# Patient Record
Sex: FEMALE | Race: BLACK OR AFRICAN AMERICAN | NOT HISPANIC OR LATINO | Employment: FULL TIME | URBAN - METROPOLITAN AREA
[De-identification: names, ages, dates, MRNs, and addresses within clinical notes are randomized per-mention and may not be internally consistent; named-entity substitution may affect disease eponyms.]

---

## 2019-03-04 ENCOUNTER — TRANSFERRED RECORDS (OUTPATIENT)
Dept: MULTI SPECIALTY CLINIC | Facility: CLINIC | Age: 34
End: 2019-03-04

## 2019-03-04 LAB
HPV ABSTRACT: NORMAL
PAP-ABSTRACT: NORMAL
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
## 2021-05-26 ENCOUNTER — OFFICE VISIT (OUTPATIENT)
Dept: FAMILY MEDICINE | Facility: CLINIC | Age: 36
End: 2021-05-26
Payer: COMMERCIAL

## 2021-05-26 VITALS
SYSTOLIC BLOOD PRESSURE: 112 MMHG | OXYGEN SATURATION: 99 % | WEIGHT: 171.8 LBS | TEMPERATURE: 98.7 F | HEART RATE: 107 BPM | RESPIRATION RATE: 16 BRPM | DIASTOLIC BLOOD PRESSURE: 60 MMHG

## 2021-05-26 DIAGNOSIS — Z91.013 FISH ALLERGY: ICD-10-CM

## 2021-05-26 DIAGNOSIS — J45.30 MILD PERSISTENT ASTHMA WITHOUT COMPLICATION: Primary | ICD-10-CM

## 2021-05-26 DIAGNOSIS — F43.10 PTSD (POST-TRAUMATIC STRESS DISORDER): ICD-10-CM

## 2021-05-26 DIAGNOSIS — L30.9 ECZEMA, UNSPECIFIED TYPE: ICD-10-CM

## 2021-05-26 DIAGNOSIS — F41.9 ANXIETY: ICD-10-CM

## 2021-05-26 DIAGNOSIS — F32.1 MODERATE MAJOR DEPRESSION (H): ICD-10-CM

## 2021-05-26 PROCEDURE — 99204 OFFICE O/P NEW MOD 45 MIN: CPT | Performed by: PHYSICIAN ASSISTANT

## 2021-05-26 RX ORDER — EPINEPHRINE 0.3 MG/.3ML
0.3 INJECTION SUBCUTANEOUS PRN
Qty: 1 EACH | Refills: 0 | Status: SHIPPED | OUTPATIENT
Start: 2021-05-26 | End: 2021-12-06

## 2021-05-26 RX ORDER — BECLOMETHASONE DIPROPIONATE HFA 80 UG/1
AEROSOL, METERED RESPIRATORY (INHALATION)
COMMUNITY
Start: 2020-07-24 | End: 2021-05-26

## 2021-05-26 RX ORDER — BECLOMETHASONE DIPROPIONATE HFA 80 UG/1
1 AEROSOL, METERED RESPIRATORY (INHALATION) 2 TIMES DAILY
Qty: 10.6 G | Refills: 5 | Status: SHIPPED | OUTPATIENT
Start: 2021-05-26 | End: 2022-06-24

## 2021-05-26 RX ORDER — FLUTICASONE PROPIONATE 50 MCG
SPRAY, SUSPENSION (ML) NASAL
COMMUNITY
Start: 2020-10-17 | End: 2022-06-24

## 2021-05-26 RX ORDER — TRIAMCINOLONE ACETONIDE 1 MG/G
OINTMENT TOPICAL 2 TIMES DAILY
Qty: 80 G | Refills: 1 | Status: SHIPPED | OUTPATIENT
Start: 2021-05-26 | End: 2023-06-29

## 2021-05-26 RX ORDER — ESCITALOPRAM OXALATE 10 MG/1
10 TABLET ORAL DAILY
Qty: 30 TABLET | Refills: 1 | Status: SHIPPED | OUTPATIENT
Start: 2021-05-26 | End: 2021-08-17

## 2021-05-26 RX ORDER — ALBUTEROL SULFATE 90 UG/1
AEROSOL, METERED RESPIRATORY (INHALATION)
COMMUNITY
Start: 2020-01-31 | End: 2021-05-26

## 2021-05-26 RX ORDER — MONTELUKAST SODIUM 10 MG/1
TABLET ORAL
COMMUNITY
Start: 2020-10-17 | End: 2021-05-26

## 2021-05-26 RX ORDER — MONTELUKAST SODIUM 10 MG/1
TABLET ORAL
Qty: 90 TABLET | Refills: 1 | Status: SHIPPED | OUTPATIENT
Start: 2021-05-26 | End: 2021-05-27

## 2021-05-26 RX ORDER — ALBUTEROL SULFATE 90 UG/1
AEROSOL, METERED RESPIRATORY (INHALATION)
Qty: 18 G | Refills: 3 | Status: SHIPPED | OUTPATIENT
Start: 2021-05-26 | End: 2021-05-27

## 2021-05-26 SDOH — HEALTH STABILITY: MENTAL HEALTH: HOW MANY STANDARD DRINKS CONTAINING ALCOHOL DO YOU HAVE ON A TYPICAL DAY?: NOT ASKED

## 2021-05-26 SDOH — HEALTH STABILITY: MENTAL HEALTH: HOW OFTEN DO YOU HAVE A DRINK CONTAINING ALCOHOL?: NOT ASKED

## 2021-05-26 SDOH — HEALTH STABILITY: MENTAL HEALTH: HOW OFTEN DO YOU HAVE 6 OR MORE DRINKS ON ONE OCCASION?: NOT ASKED

## 2021-05-26 ASSESSMENT — ANXIETY QUESTIONNAIRES
6. BECOMING EASILY ANNOYED OR IRRITABLE: MORE THAN HALF THE DAYS
2. NOT BEING ABLE TO STOP OR CONTROL WORRYING: NEARLY EVERY DAY
7. FEELING AFRAID AS IF SOMETHING AWFUL MIGHT HAPPEN: MORE THAN HALF THE DAYS
GAD7 TOTAL SCORE: 16
1. FEELING NERVOUS, ANXIOUS, OR ON EDGE: MORE THAN HALF THE DAYS
3. WORRYING TOO MUCH ABOUT DIFFERENT THINGS: NEARLY EVERY DAY
IF YOU CHECKED OFF ANY PROBLEMS ON THIS QUESTIONNAIRE, HOW DIFFICULT HAVE THESE PROBLEMS MADE IT FOR YOU TO DO YOUR WORK, TAKE CARE OF THINGS AT HOME, OR GET ALONG WITH OTHER PEOPLE: EXTREMELY DIFFICULT
5. BEING SO RESTLESS THAT IT IS HARD TO SIT STILL: MORE THAN HALF THE DAYS

## 2021-05-26 ASSESSMENT — PATIENT HEALTH QUESTIONNAIRE - PHQ9
SUM OF ALL RESPONSES TO PHQ QUESTIONS 1-9: 16
5. POOR APPETITE OR OVEREATING: MORE THAN HALF THE DAYS

## 2021-05-26 NOTE — Clinical Note
PAP and HPV available in Care Everywhere from [facility] on 3/4/19 and pap NIL with HPV negative

## 2021-05-26 NOTE — PATIENT INSTRUCTIONS
Follow up with gynecology at [facility] ([phone]) formerly called [facility].   Continue medications as you have been taking   Follow up with us in 6 months.   Call with side effects or concerns.   If you have difficulties with MyChart call [phone].  Start lexapro 10 mg daily and follow up with us in one month.

## 2021-05-27 RX ORDER — MONTELUKAST SODIUM 10 MG/1
10 TABLET ORAL AT BEDTIME
Qty: 90 TABLET | Refills: 1 | Status: SHIPPED | OUTPATIENT
Start: 2021-05-27 | End: 2022-06-03

## 2021-05-27 RX ORDER — ALBUTEROL SULFATE 90 UG/1
AEROSOL, METERED RESPIRATORY (INHALATION)
Qty: 18 G | Refills: 3 | Status: SHIPPED | OUTPATIENT
Start: 2021-05-27 | End: 2021-10-28

## 2021-05-27 ASSESSMENT — ANXIETY QUESTIONNAIRES: GAD7 TOTAL SCORE: 16

## 2021-05-27 ASSESSMENT — ASTHMA QUESTIONNAIRES: ACT_TOTALSCORE: 20

## 2021-07-13 ENCOUNTER — TELEPHONE (OUTPATIENT)
Dept: FAMILY MEDICINE | Facility: CLINIC | Age: 36
End: 2021-07-13

## 2021-07-13 NOTE — TELEPHONE ENCOUNTER
Disability forms for which phone visit was not started  Needs all records from June 25 forward faxed - release of information included in request - see forms that were faxed   Needs by July 16-

## 2021-07-13 NOTE — TELEPHONE ENCOUNTER
[place] Form faxed back to [employer] at fax [phone].  Form asking for OV notes from 6/25/21 to present, but none available in that time frame.  Last Visit 5/26/21.   Form sent for abstraction      ROCIO Larry.

## 2021-08-17 ENCOUNTER — TELEPHONE (OUTPATIENT)
Dept: FAMILY MEDICINE | Facility: CLINIC | Age: 36
End: 2021-08-17

## 2021-08-17 DIAGNOSIS — F41.9 ANXIETY: ICD-10-CM

## 2021-08-17 DIAGNOSIS — F43.10 PTSD (POST-TRAUMATIC STRESS DISORDER): ICD-10-CM

## 2021-08-17 DIAGNOSIS — F32.1 MODERATE MAJOR DEPRESSION (H): ICD-10-CM

## 2021-08-17 RX ORDER — ESCITALOPRAM OXALATE 10 MG/1
10 TABLET ORAL DAILY
Qty: 30 TABLET | Refills: 0 | Status: SHIPPED | OUTPATIENT
Start: 2021-08-17 | End: 2021-10-14

## 2021-08-17 NOTE — TELEPHONE ENCOUNTER
Routing refill request to provider for review/approval because:    Failed PHQ-9 5/26/2021 (16), please advise.     Diamond Wesley RN, BSN   [facility] [place]

## 2021-10-12 ENCOUNTER — TELEPHONE (OUTPATIENT)
Dept: FAMILY MEDICINE | Facility: CLINIC | Age: 36
End: 2021-10-12

## 2021-10-12 DIAGNOSIS — F32.1 MODERATE MAJOR DEPRESSION (H): ICD-10-CM

## 2021-10-12 DIAGNOSIS — F41.9 ANXIETY: ICD-10-CM

## 2021-10-12 DIAGNOSIS — F43.10 PTSD (POST-TRAUMATIC STRESS DISORDER): ICD-10-CM

## 2021-10-14 RX ORDER — ESCITALOPRAM OXALATE 10 MG/1
10 TABLET ORAL DAILY
Qty: 15 TABLET | Refills: 0 | Status: SHIPPED | OUTPATIENT
Start: 2021-10-14 | End: 2021-11-03 | Stop reason: DRUGHIGH

## 2021-10-14 NOTE — TELEPHONE ENCOUNTER
Given 15 days of medication- no further refills without follow up -if schedules appointment can give 30 days to make it to appointment

## 2021-10-14 NOTE — TELEPHONE ENCOUNTER
"Routing refill request to provider for review/approval because:  Requested Prescriptions   Pending Prescriptions Disp Refills    escitalopram (LEXAPRO) 10 MG tablet 30 tablet 0     Sig: Take 1 tablet (10 mg) by mouth daily ++NO FURTHER REFILLS WITHOUT FOLLOW UP+++        SSRIs Protocol Failed - 10/12/2021  9:26 AM        Failed - PHQ-9 score less than 5 in past 6 months     Please review last PHQ-9 score.           Passed - Medication is active on med list        Passed - Patient is age 18 or older        Passed - No active pregnancy on record        Passed - No positive pregnancy test in last 12 months        Passed - Recent (6 mo) or future (30 days) visit within the authorizing provider's specialty     Patient had office visit in the last 6 months or has a visit in the next 30 days with authorizing provider or within the authorizing provider's specialty.  See \"Patient Info\" tab in inbasket, or \"Choose Columns\" in Meds & Orders section of the refill encounter.                PHQ 5/26/2021   PHQ-9 Total Score 16   Q9: Thoughts of better off dead/self-harm past 2 weeks Not at all     Belinda ROSENN, RN        "

## 2021-10-14 NOTE — TELEPHONE ENCOUNTER
LVM notified of partial rx refill, please call to schedule appt with provider for more refills per provider notes below. Given clinic scheduling [phone]

## 2021-11-02 ENCOUNTER — VIRTUAL VISIT (OUTPATIENT)
Dept: FAMILY MEDICINE | Facility: CLINIC | Age: 36
End: 2021-11-02
Payer: COMMERCIAL

## 2021-11-02 DIAGNOSIS — J45.30 MILD PERSISTENT ASTHMA WITHOUT COMPLICATION: ICD-10-CM

## 2021-11-02 DIAGNOSIS — F41.9 ANXIETY: ICD-10-CM

## 2021-11-02 DIAGNOSIS — G47.00 INSOMNIA, UNSPECIFIED TYPE: ICD-10-CM

## 2021-11-02 DIAGNOSIS — F32.1 MODERATE MAJOR DEPRESSION (H): Primary | ICD-10-CM

## 2021-11-02 DIAGNOSIS — F43.10 PTSD (POST-TRAUMATIC STRESS DISORDER): ICD-10-CM

## 2021-11-02 DIAGNOSIS — L50.9 URTICARIA: ICD-10-CM

## 2021-11-02 PROCEDURE — 99214 OFFICE O/P EST MOD 30 MIN: CPT | Mod: GT | Performed by: PHYSICIAN ASSISTANT

## 2021-11-02 RX ORDER — ESCITALOPRAM OXALATE 20 MG/1
20 TABLET ORAL DAILY
Qty: 30 TABLET | Refills: 1 | Status: SHIPPED | OUTPATIENT
Start: 2021-11-02 | End: 2022-01-10

## 2021-11-02 RX ORDER — ALBUTEROL SULFATE 90 UG/1
AEROSOL, METERED RESPIRATORY (INHALATION)
Qty: 18 G | Refills: 0 | Status: SHIPPED | OUTPATIENT
Start: 2021-11-02 | End: 2021-11-02

## 2021-11-02 RX ORDER — ALBUTEROL SULFATE 90 UG/1
AEROSOL, METERED RESPIRATORY (INHALATION)
Qty: 18 G | Refills: 4 | Status: SHIPPED | OUTPATIENT
Start: 2021-11-02 | End: 2022-06-24

## 2021-11-02 ASSESSMENT — ANXIETY QUESTIONNAIRES
GAD7 TOTAL SCORE: 6
2. NOT BEING ABLE TO STOP OR CONTROL WORRYING: SEVERAL DAYS
7. FEELING AFRAID AS IF SOMETHING AWFUL MIGHT HAPPEN: SEVERAL DAYS
GAD7 TOTAL SCORE: 6
6. BECOMING EASILY ANNOYED OR IRRITABLE: SEVERAL DAYS
1. FEELING NERVOUS, ANXIOUS, OR ON EDGE: SEVERAL DAYS
4. TROUBLE RELAXING: SEVERAL DAYS
GAD7 TOTAL SCORE: 6
5. BEING SO RESTLESS THAT IT IS HARD TO SIT STILL: NOT AT ALL
7. FEELING AFRAID AS IF SOMETHING AWFUL MIGHT HAPPEN: SEVERAL DAYS
8. IF YOU CHECKED OFF ANY PROBLEMS, HOW DIFFICULT HAVE THESE MADE IT FOR YOU TO DO YOUR WORK, TAKE CARE OF THINGS AT HOME, OR GET ALONG WITH OTHER PEOPLE?: SOMEWHAT DIFFICULT
3. WORRYING TOO MUCH ABOUT DIFFERENT THINGS: SEVERAL DAYS

## 2021-11-02 ASSESSMENT — PATIENT HEALTH QUESTIONNAIRE - PHQ9
10. IF YOU CHECKED OFF ANY PROBLEMS, HOW DIFFICULT HAVE THESE PROBLEMS MADE IT FOR YOU TO DO YOUR WORK, TAKE CARE OF THINGS AT HOME, OR GET ALONG WITH OTHER PEOPLE: SOMEWHAT DIFFICULT
SUM OF ALL RESPONSES TO PHQ QUESTIONS 1-9: 5
SUM OF ALL RESPONSES TO PHQ QUESTIONS 1-9: 5

## 2021-11-02 NOTE — PROGRESS NOTES
Ottoniel is a 36 year old who is being evaluated via a billable video visit.      How would you like to obtain your AVS? MyChart  If the video visit is dropped, the invitation should be resent by:   Will anyone else be joining your video visit? No    Video Start Time: 1:41 PM    Assessment & Plan     Moderate major depression (H)  Symptoms improved with lexapro but not completely controlled.  Increase lexapro from 10 mg to 20 mg and follow up with us in one month with virtual visit   - escitalopram (LEXAPRO) 20 MG tablet  Dispense: 30 tablet; Refill: 1    Anxiety  Symptoms improved with lexapro but not completely controlled.  Increase lexapro from 10 mg to 20 mg and follow up with us in one month  - escitalopram (LEXAPRO) 20 MG tablet  Dispense: 30 tablet; Refill: 1        Insomnia, unspecified type  Benadryl and melatonin no longer helpful.  Trial of trazodone 50-100mg at bedtime and follow up with us in one month  - traZODone (DESYREL) 50 MG tablet  Dispense: 60 tablet; Refill: 0    PTSD (post-traumatic stress disorder)  As above   - escitalopram (LEXAPRO) 20 MG tablet  Dispense: 30 tablet; Refill: 1    Mild persistent asthma without complication  Refilled albuterol- is on qvar controller med - using albuterol now that colder weather- med adjustment per allergy/asthma specialist given urticaria   - Adult Allergy/Asthma Referral  - albuterol (PROAIR HFA) 108 (90 Base) MCG/ACT inhaler  Dispense: 18 g; Refill: 4    Urticaria  Antihistamines and prednisone not helpful- follow up with allergy/asthma specialist- advised no antihistamines at least 5 days prior to appointment   - Adult Allergy/Asthma Referral      Prescription drug management       Patient Instructions   Increase lexapro from 10 mg to 20 mg daily for anxiety and depression.  Take trazodone 50 mg 1-2 tablets at bedtime as needed for insomnia.   Follow up with allergy/asthma specialist for asthma and urticaria.  Do not take antihistamines at least 5 days  prior to appointment or as directed when you schedule your appointment.   Follow up with us with a video visit in approximately one month- sooner if side effects or other concerns.       No follow-ups on file.    Perla Nieto PA-C  [facility] DM Alcazar is a 36 year old who presents for the following health issues     History of Present Illness     Asthma:  She presents for follow up of asthma.  She has no cough, no wheezing, and no shortness of breath. She is using a relief medication a few times a week. She does not miss any doses of her controller medication throughout the week.Patient is aware of the following triggers: dust mites and smoke. The patient has not had a visit to the Emergency Room, Urgent Care or Hospital due to asthma since the last clinic visit.     Mental Health Follow-up:  Patient presents to follow-up on Depression & Anxiety.Patient's depression since last visit has been:  Better  The patient is not having other symptoms associated with depression.  Patient's anxiety since last visit has been:  Better  The patient is having other symptoms associated with anxiety.  Any significant life events: relationship concerns  Patient is not feeling anxious or having panic attacks.  Patient has no concerns about alcohol or drug use.     Social History  Tobacco Use    Smoking status: Never Smoker    Smokeless tobacco: Never Used  Alcohol use: Yes    Comment: once a week  Drug use: Never      Today's PHQ-9         PHQ-9 Total Score:     (P) 5   PHQ-9 Q9 Thoughts of better off dead/self-harm past 2 weeks :   (P) Not at all   Thoughts of suicide or self harm:      Self-harm Plan:        Self-harm Action:          Safety concerns for self or others:           She eats 2-3 servings of fruits and vegetables daily.She consumes 1 sweetened beverage(s) daily.She exercises with enough effort to increase her heart rate 20 to 29 minutes per day.  She exercises with enough  effort to increase her heart rate 3 or less days per week. She is missing 2 dose(s) of medications per week.  She is not taking prescribed medications regularly due to remembering to take.       Reports that she has Hives that won't go away  Went to urgent care one month ago and given course of prednisone without improvement   All over arms and chest - benadryl doesn't help  Comes and goes- migratory rash    Works for [employer] - as an      Waking up every couple hours.  Trouble falling asleep and restless and anxious    tv in bedroom- watches before goes to sleep  2-3 cups herbal tea- small amount caffeine- tries to drink decaffeinated in  afternoon  Walks   Sleep troubles recurrentl thing.   Sleep sbetter during day nap- easy to nap on weekend   Benadryl on and off whole life allergies and eczema -   Doesn't knock me out right away  Melatonin does help fall asleep but wakse up during the night       Asthma has been ok.  Doesn't like red rescue inhaler and insurance won't cover one she likes   This time of year wired cough and will wake up and need inhaler and go back to sleep- colder  Air is a trigger   Exercise, upper respiratory infection,  and cold weather -trigger asthma   Aerosols sprays and fragrances also trigger asthma     Asthma Follow-Up    Was ACT completed today?    Yes    ACT Total Scores 11/2/2021   ACT TOTAL SCORE (Goal Greater than or Equal to 20) 19   In the past 12 months, how many times did you visit the emergency room for your asthma without being admitted to the hospital? 0   In the past 12 months, how many times were you hospitalized overnight because of your asthma? 0       How many days per week do you miss taking your asthma controller medication?  0    Please describe any recent triggers for your asthma: smoke, upper respiratory infections, dust mites, pollens, exercise or sports and cold air    Have you had any Emergency Room Visits, Urgent Care Visits, or Hospital Admissions  since your last office visit?  No        Review of Systems   Constitutional, HEENT, cardiovascular, pulmonary, gi and gu systems are negative, except as otherwise noted.      Objective           Vitals:  No vitals were obtained today due to virtual visit.    Physical Exam   GENERAL: Healthy, alert and no distress  EYES: Eyes grossly normal to inspection.  No discharge or erythema, or obvious scleral/conjunctival abnormalities.  RESP: No audible wheeze, cough, or visible cyanosis.  No visible retractions or increased work of breathing.    SKIN: Visible skin clear. No significant rash, abnormal pigmentation or lesions.  NEURO: Cranial nerves grossly intact.  Mentation and speech appropriate for age.  PSYCH: Mentation appears normal, affect normal/bright, judgement and insight intact, normal speech and appearance well-groomed.                Video-Visit Details    Type of service:  Video Visit    Video End Time:1:55 PM    Originating Location (pt. Location): Home    Distant Location (provider location):  [facility]     Platform used for Video Visit: [employer]

## 2021-11-03 ENCOUNTER — MYC MEDICAL ADVICE (OUTPATIENT)
Dept: FAMILY MEDICINE | Facility: CLINIC | Age: 36
End: 2021-11-03

## 2021-11-03 RX ORDER — TRAZODONE HYDROCHLORIDE 50 MG/1
TABLET, FILM COATED ORAL
Qty: 60 TABLET | Refills: 0 | Status: SHIPPED | OUTPATIENT
Start: 2021-11-03 | End: 2022-03-17

## 2021-11-03 ASSESSMENT — ANXIETY QUESTIONNAIRES: GAD7 TOTAL SCORE: 6

## 2021-11-03 ASSESSMENT — ASTHMA QUESTIONNAIRES: ACT_TOTALSCORE: 19

## 2021-11-03 ASSESSMENT — PATIENT HEALTH QUESTIONNAIRE - PHQ9: SUM OF ALL RESPONSES TO PHQ QUESTIONS 1-9: 5

## 2021-11-03 NOTE — PATIENT INSTRUCTIONS
"Increase lexapro from 10 mg to 20 mg daily for anxiety and depression.  Take trazodone 50 mg 1-2 tablets at bedtime as needed for insomnia.   Follow up with allergy/asthma specialist for asthma and urticaria.  Do not take antihistamines at least 5 days prior to appointment or as directed when you schedule your appointment.   Follow up with us with a video visit in approximately one month- sooner if side effects or other concerns.     Sleep Hygiene  What is it?     \"Sleep hygiene\" means having good sleep habits. Follow the tips below to sleep better at night.    Get on a schedule. Go to bed and get up at about the same time every day.    Listen to your body. Only try to sleep when you actually feel tired or sleepy.    Be patient. If you haven't been able to get to sleep after about 20 minutes or more, get up and do something calming or boring until you feel sleepy. Then, return to bed and try again.    Avoid caffeine (coffee, tea, cola drinks, chocolate and some medicines) for at least 4 to 6 hours before going to bed. We also suggest you don't use alcohol or nicotine (cigarettes) during this time. Both can make it harder for you to fall asleep and stay asleep.    Use your bed for sleeping only. That means no TV, computer or homework in bed!    Don't nap during the day. If you do nap, make sure it is for less than an hour and before 3 p.m.    Create sleep rituals that remind your body that it is time to sleep. Examples include breathing exercises, stretching, or reading a book.    Try a bath or shower before bed. Having a hot bath 1 to 2 hours before bedtime can help you feel sleepy.    Don't watch the clock. Checking the clock during the night can wake you up. It can also lead to negative thoughts such as \"I will never fall asleep.\"    Use a sleep diary. Track your sleep schedule to know your sleep patterns and to see where you can improve.    Get regular exercise. But try not to do heavy exercise in the 4 hours " before bedtime.    Eat a healthy, balanced diet. Try eating a light, healthy snack before bed, but avoid eating a heavy meal.    Create the right sleeping area. A cool, dark, quiet room is best. If needed, try earplugs, fans and blackout curtains.    Keep your daytime routine the same even if you have a bad night sleep. Avoiding activities the next day can make it harder to sleep.

## 2021-11-09 ENCOUNTER — OFFICE VISIT (OUTPATIENT)
Dept: ALLERGY | Facility: CLINIC | Age: 36
End: 2021-11-09
Attending: PHYSICIAN ASSISTANT
Payer: COMMERCIAL

## 2021-11-09 VITALS
DIASTOLIC BLOOD PRESSURE: 72 MMHG | OXYGEN SATURATION: 99 % | SYSTOLIC BLOOD PRESSURE: 125 MMHG | HEART RATE: 90 BPM | WEIGHT: 175 LBS

## 2021-11-09 DIAGNOSIS — J30.81 ALLERGIC RHINITIS DUE TO ANIMALS: ICD-10-CM

## 2021-11-09 DIAGNOSIS — J30.1 SEASONAL ALLERGIC RHINITIS DUE TO POLLEN: ICD-10-CM

## 2021-11-09 DIAGNOSIS — Z91.013 FISH ALLERGY: ICD-10-CM

## 2021-11-09 DIAGNOSIS — Z91.013 SHELLFISH ALLERGY: ICD-10-CM

## 2021-11-09 DIAGNOSIS — T78.1XXA ADVERSE REACTION TO FOOD, INITIAL ENCOUNTER: Primary | ICD-10-CM

## 2021-11-09 DIAGNOSIS — L30.9 ECZEMA, UNSPECIFIED TYPE: ICD-10-CM

## 2021-11-09 DIAGNOSIS — J30.89 ALLERGIC RHINITIS DUE TO DUST MITE: ICD-10-CM

## 2021-11-09 PROCEDURE — 99204 OFFICE O/P NEW MOD 45 MIN: CPT | Mod: 25 | Performed by: ALLERGY & IMMUNOLOGY

## 2021-11-09 PROCEDURE — 95004 PERQ TESTS W/ALRGNC XTRCS: CPT | Performed by: ALLERGY & IMMUNOLOGY

## 2021-11-09 NOTE — LETTER
ANAPHYLAXIS ALLERGY PLAN    Name: Ottoniel Mccormack      :  1985    Allergy to:  Fish, Shellfish    Weight: 175 lbs 0 oz           Asthma:  Yes  (higher risk for a severe reaction)      Do not depend on antihistamines or inhalers (bronchodilators) to treat a severe reaction; USE EPINEPHRINE      MEDICATIONS/DOSES  Epinephrine:  EpiPen/Adrenaclick  Epinephrine dose:  0.3 mg IM  Antihistamine:  Zyrtec (Cetirizine)  Antihistamine dose:  10mg  Other (e.g., inhaler-bronchodilator if wheezing):  Albuterol       ANAPHYLAXIS ALLERGY PLAN (Page 2)  Patient:  Ottoniel Mccormack  :  1985         Electronically signed on 2021 by:  Neo Barnes MD  Parent/Guardian Authorization Signature:  ___________________________ Date:    FORM PROVIDED COURT[employer]Y OF FOOD ALLERGY RESEARCH & EDUCATION (FARE) ([email or web address]) 2017

## 2021-11-09 NOTE — PROGRESS NOTES
Ottoniel Mccormack was seen in the Allergy Clinic at [facility].    Ottoniel Mccormack is a 36 year old Black or  female being seen today at the request of Perla Nieto PA-C in consultation for urticaria.    She reports that a month and a half ago she started developing skin-colored raised lesions on her arms, then her legs, her chest and abdomen. The lesions were the size of the tip of a pen, and became progressively itchier with more areas involved over the course of a week. Did not appear red, or seem to fade or come up in new areas. She started taking benadryl without significant improvement in her itching. She then went to urgent care and was prescribed seven days of prednisone, which made some of the lesions fade and leave behind scars. After she finished the course of steroids the lesions came back with itching. She has since started using topical steroids (hydrocortisone and triamcinolone) that she has used in the past for her eczema, with improvement in her rash.     She used to have similar but more mild rashes that happened most often when her seasonal allergy symptoms of nasal congestion and cough were the worst, but the lesions always went away within one day and improved with benadryl. For several years she has had nasal congestion, a dry cough, and sneezing worse in the spring, summer and fall. She gets the same symptoms when she is exposed to dust and feels like her throat is easily irritated by certain fragrances. She has used antihistamines and singulair for the past five years and feels that the singulair has kept these symptoms under control. No eye watering, irritation or redness.     Around age 5 she had an episode of facial swelling and throat tightening after eating catfish. She started avoiding all fish at that time but did not have allergy testing done. A few years later noticed mild tongue itching with shrimp and since then has avoided all shellfish.  Three years ago she accidentally ate a piece of walleye fish and developed severe facial swelling, a rash all over her body, and throat tightening with difficulty breathing. She presented to the ER and responded to epinephrine. She has continued to avoid all fish and shellfish since then but has never had reactions to any other foods.     She has a history of asthma for which she uses qvar and albuterol inhalers as needed; has not needed them recently with no exacerbations in the past year. She feels like her asthma is under good control.     Her eczema seems to have gotten worse in the past few months with more areas of dry, itchy skin on her hands and arms. Topical steroids have been helpful.     Took trazodone yesterday; no antihistamines in the last seven days    PAST MEDICAL HISTORY:  Asthma    Family History   Problem Relation Age of Onset     Hypertension Mother      Other - See Comments Father 67        Julia - thinks pancreatic cancer      No Known Problems Brother      Diabetes Maternal Grandmother         adult      No Known Problems Brother      No Known Problems Brother      No Known Problems Brother      No Known Problems Brother      Breast Cancer No family hx of      Colon Cancer No family hx of      Past Surgical History:   Procedure Laterality Date     LEEP TX, CERVICAL         ENVIRONMENTAL HISTORY:   [place] with a forced air. They do have central air conditioning. The patient's bedroom is furnished with carpeting in bedroom and fabric window coverings.  Pets inside the house include None. There is no history of cockroach or mice infestation. Do you smoke cigarettes or other recreational drugs? No Do you vape or use an e-cigarette? No. There is/are 0 smokers living in the house. There is/are 0 who smoke ecigarettes/vape living in the house. The house does not have a damp basement.     SOCIAL HISTORY:   Ottoniel is employed as an  . She lives with her partner.  Her partner works as a/an .    REVIEW OF SYSTEMS:  General: negative for weight gain. negative for weight loss. negative for changes in sleep.   Eyes: positive  for itching. negative for redness. negative for tearing/watering. negative for vision changes  Ears: negative for fullness. negative for hearing loss. negative for dizziness.   Nose: negative for snoring.negative for changes in smell. negative for drainage.   Throat: negative for hoarseness. negative for sore throat. negative for trouble swallowing.   Lungs: negative for cough. negative for shortness of breath.negative for wheezing. negative for sputum production.   Cardiovascular: negative for chest pain. negative for swelling of ankles. negative for fast or irregular heartbeat.   Gastrointestinal: negative for nausea. negative for heartburn. negative for acid reflux.   Musculoskeletal: negative for joint pain. negative for joint stiffness. negative for joint swelling.   Neurologic: negative for seizures. negative for fainting. negative for weakness.   Psychiatric: negative for changes in mood. negative for anxiety.   Endocrine: negative for cold intolerance. negative for heat intolerance. negative for tremors.   Hematologic: negative for easy bruising. negative for easy bleeding.  Integumentary: positive  for rash. negative for scaling. negative for nail changes.       Current Outpatient Medications:      albuterol (PROAIR HFA) 108 (90 Base) MCG/ACT inhaler, INHALE 1 TO 2 PUFFS BY MOUTH EVERY 6 HOURS AS NEEDED FOR WHEEZING, Disp: 18 g, Rfl: 4     EPINEPHrine (ANY BX GENERIC EQUIV) 0.3 MG/0.3ML injection 2-pack, Inject 0.3 mLs (0.3 mg) into the muscle as needed for anaphylaxis, Disp: 1 each, Rfl: 0     escitalopram (LEXAPRO) 20 MG tablet, Take 1 tablet (20 mg) by mouth daily, Disp: 30 tablet, Rfl: 1     fluticasone (FLONASE) 50 MCG/ACT nasal spray, SHAKE LQ AND U 1 TO 2 SPRAYS IEN D, Disp: , Rfl:       montelukast (SINGULAIR) 10 MG tablet, Take 1 tablet (10 mg) by mouth At Bedtime, Disp: 90 tablet, Rfl: 1     QVAR REDIHALER 80 MCG/ACT inhaler, Inhale 1 puff into the lungs 2 times daily, Disp: 10.6 g, Rfl: 5     traZODone (DESYREL) 50 MG tablet, 1-2 at bedtime as needed for sleep., Disp: 60 tablet, Rfl: 0     triamcinolone (KENALOG) 0.1 % external ointment, Apply topically 2 times daily For 14 days - not use for 14 days then repeat as needed, Disp: 80 g, Rfl: 1  Immunization History   Administered Date(s) Administered     COVID-19,PF,Moderna 07/07/2021, 08/06/2021     HepA-Adult 04/01/2014, 12/02/2014     HepB-Adult 03/25/2015, 09/10/2015, 06/27/2016     Influenza Vaccine IM > 6 months Valent IIV4 (Alfuria,Fluzone) 10/22/2018     Pneumococcal 23 valent 07/21/2017     Typhoid IM 04/01/2014     Yellow Fever 04/01/2014     Yellow Fever, Live (Stamaril) 04/01/2014     Allergies   Allergen Reactions     Food Anaphylaxis     PN: seafood     Shellfish Allergy Hives         EXAM:   /72 (BP Location: Right arm, Patient Position: Sitting, Cuff Size: Adult Regular)   Pulse 90   Wt 79.4 kg (175 lb)   SpO2 99%   GENERAL APPEARANCE: alert, interactive, well-appearing, not in distress  SKIN: Diffuse  < 1 mm skin-colored raised lesions on arms, legs, and trunk with patches of dry, mildly hyperpigmented skin. No erythema or large, raised lesions.   HEAD: atraumatic, normocephalic  EYES: conjunctivae and sclerae clear  ENT: no scars or lesions, nasal exam showed no discharge, swelling or lesions noted, tongue midline and normal, soft palate, uvula, and tonsils normal  NECK: no asymmetry, masses, or scars  LUNGS: unlabored respirations, no intercostal retractions or accessory muscle use  HEART: regular rate and regular rhythm, no m/g/r  MUSCULOSKELETAL: no musculoskeletal defects are noted  NEURO: no focal deficits noted  PSYCH: does not appear depressed or anxious    WORKUP: Skin testing    ENVIRONMENTAL PERCUTANEOUS SKIN  TESTING: ADULT  [facility] 11/9/2021   Consent Y   Ordering Physician Dr. Barnes   Interpreting Physician Dr. Barnes   Testing Technician Ericka IGNACIO RN   Location Back   Time start: 10:57 AM   Time End: 11:12 AM   Positive Control: Histatrol*ALK 1 mg/ml 7/20   Negative Control: 50% Glycerin 0   Cat Hair*ALK (10,000 BAU/ml) 9/20   AP Dog Hair/Dander (1:100 w/v) 0   Dust Mite p. 30,000 AU/ml 27/35   Dust Mite f. (30,000 AU/ml) 16/25   Jarad (W/F in millimeters) 0   Irving Grass (100,000 BAU/mL) 0   Red Cedar (W/F in millimeters) 0   Maple/Flathead (W/F in millimeters) 0   Hackberry (W/F in millimeters) 0   [place] (W/F in millimeters) 0   [place]rence *ALK (W/F in millimeters) 0   American Elm (W/F in millimeters) 5/7   [place] (W/F in millimeters) 0   Black [place] (W/F in millimeters) 0   Birch Mix (W/F in millimeters) 7/13   [place] (W/F in millimeters) 0   Oak (W/F in millimeters) 0   Cocklebur (W/F in millimeters) 0   Th[place]man (W/F in millimeters) 7/13   White Doug (W/F in millimeters) 5/10   Careless (W/F in millimeters) 0   Nettle (W/F in millimeters) 5/7   English Plantain (W/F in millimeters) 0   Kochia (W/F in millimeters) 0   Lamb's Quarter (W/F in millimeters) 0   Marshelder (W/F in millimeters) 0   Ragweed Mix* ALK (W/F in millimeters) 12/27   Russian Thistle (W/F in millimeters) 0   Sagebrush/Mugwort (W/F in millimeters) 0   Sheep Sorrel (W/F in millimeters) 0   Feather Mix* ALK (W/F in millimeters) 0   Penicillium Mix (1:10 w/v) 0   Curvularia spicifera (1:10 w/v) 0   Epicoccum (1:10 w/v) 0   Aspergillus fumigatus (1:10 w/v): 0   Alternaria tenius (1:10 w/v) 0   H. Cladosporium (1:10 w/v) 0   Phoma herbarum (1:10 w/v) 0      FOOD ALLERGEN PERCUTANEOUS SKIN TESTING  [facility]  11/9/2021   Consent Y   Shrimp 1:20 (W/F in millimeters) 0   Lobster 1:20 (W/F in millimeters) 0   Crab 1:20 (W/F in millimeters) 0   Clam 1:20 (W/F in millimeters) 0   Oyster 1:20 (W/F in millimeters) 5/7    Scallops 1:20 (W/F in millimeters) 0   Tuna  1:20 (W/F in millimeters) 7/10   Cod 1:20 (W/F in millimeters) 20/30   [place]  1:20 (W/F in millimeters) 20/35      Appropriate response to controls, positive to cat, dust mite, trees, weeds, fish, and oyster    ASSESSMENT/PLAN:  Ottoniel Mccormack is a 36 year old female presenting with several years of seasonal nasal congestion associated with worsening skin rash in the setting of well controlled asthma, eczema, and history of severe allergic reactions to fish. Her skin rash is unlikely to be urticarial given the size and time course of the lesions, but considering that she has had more mild eruptions of the same rash in the past with exposure to irritants this may be an exacerbation of her eczema vs an irritant dermatitis that has responded to topical steroid therapy. Her asthma has been well controlled, and she has not had any reactions to foods recently with careful avoidance. Allergy skin testing was done today to evaluate for fish, shellfish, and environmental allergies and showed positive reactions to cat, dust mite, tree pollens, fish and oyster. We discussed that her tongue itching with shrimp may have been due to cross-reactivity between shrimp and dust mite, but that give the risk of progressing to a more severe reaction, she should continue to avoid all shellfish and fish. Her seasonal symptoms are explained by allergies to dust mite and pollen, and she feels they are under good control currently with oral montelukast, and importantly do no seem to be worsening her underlying asthma but may be contributing to her skin. Will continue aggressive topical therapies for eczema. If her symptoms worsen and she would like to discuss alternative therapies including allergy shots she could follow up at that time.       Allergic rhinitis due to dust mite  1. Continue taking montelukast daily  2. Use an oral antihistamine prior to exposure to cats  3. Follow up as needed  if symptoms worsen or persist    Fish allergy  Oyster allergy  1. Continue avoidance of all fish and shellfish    Eczema  1. Continue using topical hydrocortisone and triamcinolone  2. Continue aggressive hydration of the skin with moisturizers    The patient was seen and discussed with Dr. Barnes.   Felicity Davenport MD  Pediatric Resident PGY-1      Physician Attestation   I, Neo Barnes MD, saw this patient and agree with the findings and plan of care as documented in the note.        1. Adverse reaction to food, initial encounter - Ottoniel reports she has had previous reactions to fish that are consistent with anaphylaxis. She has had milder reactions after eating shrimp. Skin prick testing was notable for sensitization to fish and oyster. Her skin testing was also positive for sensitization to dust mite and there is cross-reactivity between shrimp and dust mite tropomyosins.     - recommend avoidance of all fish and shellfish  - use epinephrine auto-injector as directed for severe allergic reactions  - take 10mg of cetirizine as directed for mild allergic reactions  - ALLERGY SKIN TESTS,ALLERGENS    2. Fish allergy - see above    - ALLERGY SKIN TESTS,ALLERGENS    3. Shellfish allergy - see above    - ALLERGY SKIN TESTS,ALLERGENS    4. Allergic rhinitis due to animals - Skin testing was notable for sensitization to seasonal and perennial aeroallergens.    - continue taking montelukast - 10mg daily  - add second generation H1 antihistamine and/or nasal steroid for persistent symptoms  - consider allergen immunotherapy for long-term management of allergy and asthma symptoms  - ALLERGY SKIN TESTS,ALLERGENS    5. Allergic rhinitis due to dust mite - see above    - ALLERGY SKIN TESTS,ALLERGENS    6. Seasonal allergic rhinitis due to pollen - see above    - ALLERGY SKIN TESTS,ALLERGENS    7. Eczema, unspecified type - Ottoniel reports a long-standing history of eczema. The description and appearance of her rash is not  consistent with urticaria.    - avoid all skin care products, soaps, and detergents that contain scents/perfumes or dyes  - recommend daily 10-15 minute shower or bath in lukewarm water - use mild soap or skin cleanser on axillae, groin, and feet and other areas as needed  - recommend frequent application of bland emollient such as Vaseline, Aquaphor, Cerave, or Vanicream  - apply 0.1% triamcinolone cream to affected areas twice daily as needed  - ALLERGY SKIN TESTS,ALLERGENS      Follow-up in the allergy clinic as neshelley      Thank you for allowing me to participate in the care of Carsonmoisesgeovanna Mccormack.      Neo Barnes MD, FAAAAI  Allergy/Immunology  [facility]      Chart documentation done in part with Dragon Voice Recognition Software. Although reviewed after completion, some word and grammatical errors may remain.

## 2021-11-09 NOTE — LETTER
11/9/2021         RE: Ottoniel Mccormack  [address] [facility] [place] [address]        Dear Colleague,    Thank you for referring your patient, Ottoniel Mccormack, to the [facility]. Please see a copy of my visit note below.    Ottoniel Mccormack was seen in the Allergy Clinic at [facility].    Ottoniel Mccormack is a 36 year old Black or  female being seen today at the request of Perla Nieto PA-C in consultation for urticaria.    She reports that a month and a half ago she started developing skin-colored raised lesions on her arms, then her legs, her chest and abdomen. The lesions were the size of the tip of a pen, and became progressively itchier with more areas involved over the course of a week. Did not appear red, or seem to fade or come up in new areas. She started taking benadryl without significant improvement in her itching. She then went to urgent care and was prescribed seven days of prednisone, which made some of the lesions fade and leave behind scars. After she finished the course of steroids the lesions came back with itching. She has since started using topical steroids (hydrocortisone and triamcinolone) that she has used in the past for her eczema, with improvement in her rash.     She used to have similar but more mild rashes that happened most often when her seasonal allergy symptoms of nasal congestion and cough were the worst, but the lesions always went away within one day and improved with benadryl. For several years she has had nasal congestion, a dry cough, and sneezing worse in the spring, summer and fall. She gets the same symptoms when she is exposed to dust and feels like her throat is easily irritated by certain fragrances. She has used antihistamines and singulair for the past five years and feels that the singulair has kept these symptoms under control. No eye watering, irritation or redness.     Around age 5 she had an  episode of facial swelling and throat tightening after eating catfish. She started avoiding all fish at that time but did not have allergy testing done. A few years later noticed mild tongue itching with shrimp and since then has avoided all shellfish. Three years ago she accidentally ate a piece of walleye fish and developed severe facial swelling, a rash all over her body, and throat tightening with difficulty breathing. She presented to the ER and responded to epinephrine. She has continued to avoid all fish and shellfish since then but has never had reactions to any other foods.     She has a history of asthma for which she uses qvar and albuterol inhalers as needed; has not needed them recently with no exacerbations in the past year. She feels like her asthma is under good control.     Her eczema seems to have gotten worse in the past few months with more areas of dry, itchy skin on her hands and arms. Topical steroids have been helpful.     Took trazodone yesterday; no antihistamines in the last seven days    PAST MEDICAL HISTORY:  Asthma    Family History   Problem Relation Age of Onset     Hypertension Mother      Other - See Comments Father 67        Julia - thinks pancreatic cancer      No Known Problems Brother      Diabetes Maternal Grandmother         adult      No Known Problems Brother      No Known Problems Brother      No Known Problems Brother      No Known Problems Brother      Breast Cancer No family hx of      Colon Cancer No family hx of      Past Surgical History:   Procedure Laterality Date     LEEP TX, CERVICAL         ENVIRONMENTAL HISTORY:   [place] is heated with a forced air. They do have central air conditioning. The patient's bedroom is furnished with carpeting in bedroom and fabric window coverings.  Pets inside the house include None. There is no history of cockroach or mice infestation. Do you smoke cigarettes or other recreational  drugs? No Do you vape or use an e-cigarette? No. There is/are 0 smokers living in the house. There is/are 0 who smoke ecigarettes/vape living in the house. The house does not have a damp basement.     SOCIAL HISTORY:   Ottoniel is employed as an . She lives with her partner.  Her partner works as a/an .    REVIEW OF SYSTEMS:  General: negative for weight gain. negative for weight loss. negative for changes in sleep.   Eyes: positive  for itching. negative for redness. negative for tearing/watering. negative for vision changes  Ears: negative for fullness. negative for hearing loss. negative for dizziness.   Nose: negative for snoring.negative for changes in smell. negative for drainage.   Throat: negative for hoarseness. negative for sore throat. negative for trouble swallowing.   Lungs: negative for cough. negative for shortness of breath.negative for wheezing. negative for sputum production.   Cardiovascular: negative for chest pain. negative for swelling of ankles. negative for fast or irregular heartbeat.   Gastrointestinal: negative for nausea. negative for heartburn. negative for acid reflux.   Musculoskeletal: negative for joint pain. negative for joint stiffness. negative for joint swelling.   Neurologic: negative for seizures. negative for fainting. negative for weakness.   Psychiatric: negative for changes in mood. negative for anxiety.   Endocrine: negative for cold intolerance. negative for heat intolerance. negative for tremors.   Hematologic: negative for easy bruising. negative for easy bleeding.  Integumentary: positive  for rash. negative for scaling. negative for nail changes.       Current Outpatient Medications:      albuterol (PROAIR HFA) 108 (90 Base) MCG/ACT inhaler, INHALE 1 TO 2 PUFFS BY MOUTH EVERY 6 HOURS AS NEEDED FOR WHEEZING, Disp: 18 g, Rfl: 4     EPINEPHrine (ANY BX GENERIC EQUIV) 0.3 MG/0.3ML injection 2-pack, Inject 0.3 mLs (0.3 mg) into the muscle as needed  for anaphylaxis, Disp: 1 each, Rfl: 0     escitalopram (LEXAPRO) 20 MG tablet, Take 1 tablet (20 mg) by mouth daily, Disp: 30 tablet, Rfl: 1     fluticasone (FLONASE) 50 MCG/ACT nasal spray, SHAKE LQ AND U 1 TO 2 SPRAYS IEN D, Disp: , Rfl:      montelukast (SINGULAIR) 10 MG tablet, Take 1 tablet (10 mg) by mouth At Bedtime, Disp: 90 tablet, Rfl: 1     QVAR REDIHALER 80 MCG/ACT inhaler, Inhale 1 puff into the lungs 2 times daily, Disp: 10.6 g, Rfl: 5     traZODone (DESYREL) 50 MG tablet, 1-2 at bedtime as needed for sleep., Disp: 60 tablet, Rfl: 0     triamcinolone (KENALOG) 0.1 % external ointment, Apply topically 2 times daily For 14 days - not use for 14 days then repeat as needed, Disp: 80 g, Rfl: 1  Immunization History   Administered Date(s) Administered     COVID-19,PF,Moderna 07/07/2021, 08/06/2021     HepA-Adult 04/01/2014, 12/02/2014     HepB-Adult 03/25/2015, 09/10/2015, 06/27/2016     Influenza Vaccine IM > 6 months Valent IIV4 (Alfuria,Fluzone) 10/22/2018     Pneumococcal 23 valent 07/21/2017     Typhoid IM 04/01/2014     Yellow Fever 04/01/2014     Yellow Fever, Live (Stamaril) 04/01/2014     Allergies   Allergen Reactions     Food Anaphylaxis     PN: seafood     Shellfish Allergy Hives         EXAM:   /72 (BP Location: Right arm, Patient Position: Sitting, Cuff Size: Adult Regular)   Pulse 90   Wt 79.4 kg (175 lb)   SpO2 99%   GENERAL APPEARANCE: alert, interactive, well-appearing, not in distress  SKIN: Diffuse  < 1 mm skin-colored raised lesions on arms, legs, and trunk with patches of dry, mildly hyperpigmented skin. No erythema or large, raised lesions.   HEAD: atraumatic, normocephalic  EYES: conjunctivae and sclerae clear  ENT: no scars or lesions, nasal exam showed no discharge, swelling or lesions noted, tongue midline and normal, soft palate, uvula, and tonsils normal  NECK: no asymmetry, masses, or scars  LUNGS: unlabored respirations, no intercostal retractions or accessory muscle  use  HEART: regular rate and regular rhythm, no m/g/r  MUSCULOSKELETAL: no musculoskeletal defects are noted  NEURO: no focal deficits noted  PSYCH: does not appear depressed or anxious    WORKUP: Skin testing    ENVIRONMENTAL PERCUTANEOUS SKIN TESTING: ADULT  [employer] 11/9/2021   Consent Y   Ordering Physician Dr. Barnes   Interpreting Physician Dr. Barnes   Testing Technician Ericka IGNACIO RN   Location Back   Time start: 10:57 AM   Time End: 11:12 AM   Positive Control: Histatrol*ALK 1 mg/ml 7/20   Negative Control: 50% Glycerin 0   Cat Hair*ALK (10,000 BAU/ml) 9/20   AP Dog Hair/Dander (1:100 w/v) 0   Dust Mite p. 30,000 AU/ml 27/35   Dust Mite f. (30,000 AU/ml) 16/25   Jarad (W/F in millimeters) 0   Irving Grass (100,000 BAU/mL) 0   Red Cedar (W/F in millimeters) 0   Maple/[place] (W/F in millimeters) 0   Hackberry (W/F in millimeters) 0   [place] (W/F in millimeters) 0   [place] *ALK (W/F in millimeters) 0   American Elm (W/F in millimeters) 5/7   [place] (W/F in millimeters) 0   Black [place] (W/F in millimeters) 0   Birch Mix (W/F in millimeters) 7/13   [place] (W/F in millimeters) 0   Oak (W/F in millimeters) 0   Cocklebur (W/F in millimeters) 0   [place] (W/F in millimeters) 7/13   White Doug (W/F in millimeters) 5/10   Careless (W/F in millimeters) 0   Nettle (W/F in millimeters) 5/7   English Plantain (W/F in millimeters) 0   Kochia (W/F in millimeters) 0   Lamb's Quarter (W/F in millimeters) 0   Marshelder (W/F in millimeters) 0   Ragweed Mix* ALK (W/F in millimeters) 12/27   Russian Thistle (W/F in millimeters) 0   Sagebrush/Mugwort (W/F in millimeters) 0   Sheep Sorrel (W/F in millimeters) 0   Feather Mix* ALK (W/F in millimeters) 0   Penicillium Mix (1:10 w/v) 0   Curvularia spicifera (1:10 w/v) 0   Epicoccum (1:10 w/v) 0   Aspergillus fumigatus (1:10 w/v): 0   Alternaria tenius (1:10 w/v) 0   H. Cladosporium (1:10 w/v) 0   Phoma herbarum (1:10 w/v) 0      FOOD ALLERGEN PERCUTANEOUS  SKIN TESTING  [employer]  11/9/2021   Consent Y   Shrimp 1:20 (W/F in millimeters) 0   Lobster 1:20 (W/F in millimeters) 0   Crab 1:20 (W/F in millimeters) 0   Clam 1:20 (W/F in millimeters) 0   Oyster 1:20 (W/F in millimeters) 5/7   Scallops 1:20 (W/F in millimeters) 0   Tuna  1:20 (W/F in millimeters) 7/10   Cod 1:20 (W/F in millimeters) 20/30   [place]  1:20 (W/F in millimeters) 20/35      Appropriate response to controls, positive to cat, dust mite, trees, weeds, fish, and oyster    ASSESSMENT/PLAN:  Ottoniel Mccormack is a 36 year old female presenting with several years of seasonal nasal congestion associated with worsening skin rash in the setting of well controlled asthma, eczema, and history of severe allergic reactions to fish. Her skin rash is unlikely to be urticarial given the size and time course of the lesions, but considering that she has had more mild eruptions of the same rash in the past with exposure to irritants this may be an exacerbation of her eczema vs an irritant dermatitis that has responded to topical steroid therapy. Her asthma has been well controlled, and she has not had any reactions to foods recently with careful avoidance. Allergy skin testing was done today to evaluate for fish, shellfish, and environmental allergies and showed positive reactions to cat, dust mite, tree pollens, fish and oyster. We discussed that her tongue itching with shrimp may have been due to cross-reactivity between shrimp and dust mite, but that give the risk of progressing to a more severe reaction, she should continue to avoid all shellfish and fish. Her seasonal symptoms are explained by allergies to dust mite and pollen, and she feels they are under good control currently with oral montelukast, and importantly do no seem to be worsening her underlying asthma but may be contributing to her skin. Will continue aggressive topical therapies for eczema. If her symptoms worsen and she would like to discuss  alternative therapies including allergy shots she could follow up at that time.       Allergic rhinitis due to dust mite  1. Continue taking montelukast daily  2. Use an oral antihistamine prior to exposure to cats  3. Follow up as needed if symptoms worsen or persist    Fish allergy  Oyster allergy  1. Continue avoidance of all fish and shellfish    Eczema  1. Continue using topical hydrocortisone and triamcinolone  2. Continue aggressive hydration of the skin with moisturizers    The patient was seen and discussed with Dr. Barnes.   Felicity Davenport MD  Pediatric Resident PGY-1      Physician Attestation   I, Neo Barnes MD, saw this patient and agree with the findings and plan of care as documented in the note.        1. Adverse reaction to food, initial encounter - Ottoniel reports she has had previous reactions to fish that are consistent with anaphylaxis. She has had milder reactions after eating shrimp. Skin prick testing was notable for sensitization to fish and oyster. Her skin testing was also positive for sensitization to dust mite and there is cross-reactivity between shrimp and dust mite tropomyosins.     - recommend avoidance of all fish and shellfish  - use epinephrine auto-injector as directed for severe allergic reactions  - take 10mg of cetirizine as directed for mild allergic reactions  - ALLERGY SKIN TESTS,ALLERGENS    2. Fish allergy - see above    - ALLERGY SKIN TESTS,ALLERGENS    3. Shellfish allergy - see above    - ALLERGY SKIN TESTS,ALLERGENS    4. Allergic rhinitis due to animals - Skin testing was notable for sensitization to seasonal and perennial aeroallergens.    - continue taking montelukast - 10mg daily  - add second generation H1 antihistamine and/or nasal steroid for persistent symptoms  - consider allergen immunotherapy for long-term management of allergy and asthma symptoms  - ALLERGY SKIN TESTS,ALLERGENS    5. Allergic rhinitis due to dust mite - see above    - ALLERGY SKIN  TESTS,ALLERGENS    6. Seasonal allergic rhinitis due to pollen - see above    - ALLERGY SKIN TESTS,ALLERGENS    7. Eczema, unspecified type - Ottoniel reports a long-standing history of eczema. The description and appearance of her rash is not consistent with urticaria.    - avoid all skin care products, soaps, and detergents that contain scents/perfumes or dyes  - recommend daily 10-15 minute shower or bath in lukewarm water - use mild soap or skin cleanser on axillae, groin, and feet and other areas as needed  - recommend frequent application of bland emollient such as Vaseline, Aquaphor, Cerave, or Vanicream  - ALLERGY SKIN TESTS,ALLERGENS      Follow-up in the allergy clinic as neshelley      Thank you for allowing me to participate in the care of Ottoniel Mccormack.      Neo Barnes MD, [facility]  Allergy/Immunology  [facility]      Chart documentation done in part with Dragon Voice Recognition Software. Although reviewed after completion, some word and grammatical errors may remain.      Per provider verbal order, placed Adult Environmental Panel, Shellfish Panel, Tuna, Cod, [place] scratch test.  Consent was obtained prior to procedure.  Once panels were placed, patient was monitored for 15 minutes in clinic.  Provider read test after 15 minutes..  Pt tolerated procedure well.  All questions and concerns were addressed at office visit.     MILAD Estrada, RN        Again, thank you for allowing me to participate in the care of your patient.        Sincerely,        Neo Barnes MD

## 2021-11-09 NOTE — PATIENT INSTRUCTIONS
If you have any questions regarding your allergies, asthma, or what we discussed during your visit today please call the allergy clinic or contact us via [employer].    [facility] Allergy RN Line: [phone] - call this number with any questions during or after business/clinic hours  [facility] Scheduling Line: [phone]  [facility] Scheduling Line: [phone] - this number is ONLY for scheduling at the [facility] and should not be used to get in touch with the allergy team    All visits for food challenges, medication/drug allergy testing, and drug challenges MUST be scheduled through the allergy clinic nurse. Please call the nurse at [phone] or send a [employer] message for scheduling. Appointments for these visits that are made through the schedulers or via [employer] may be cancelled or rescheduled.    Clinic Schedule:   [place] - Monday, Tuesday, and Thursday  [address] [place] [address]    [facility] - Wednesday  [address] [place] [address]      Continue the montelukast daily - if your seasonal allergy symptoms get worse schedule a follow-up visit to discuss other treatments    Take 10mg of cetirizine (Zyrtec) an hour before visiting friends or family members who have cats    Continue to avoid all fish and shellfish    ENVIRONMENTAL PERCUTANEOUS SKIN TESTING: ADULT  [place] [employer] 11/9/2021   Consent Y   Ordering Physician Dr. Barnes   Interpreting Physician Dr. Barnes   Testing Technician Ericka IGNACIO RN   Location Back   Time start: 10:57 AM   Time End: 11:12 AM   Positive Control: Histatrol*ALK 1 mg/ml 7/20   Negative Control: 50% Glycerin 0   Cat Hair*ALK (10,000 BAU/ml) 9/20   AP Dog Hair/Dander (1:100 w/v) 0   Dust Mite p. 30,000 AU/ml 27/35   Dust Mite f. (30,000 AU/ml) 16/25   Jarad (W/F in millimeters) 0   Irving Grass (100,000 BAU/mL) 0   Red Cedar (W/F in millimeters) 0    Maple/[place] (W/F in millimeters) 0   Hackberry (W/F in millimeters) 0   [place] (W/F in millimeters) 0   [place] *ALK (W/F in millimeters) 0   American Elm (W/F in millimeters) 5/7   [place] (W/F in millimeters) 0   Black [place] (W/F in millimeters) 0   Birch Mix (W/F in millimeters) 7/13   [place] (W/F in millimeters) 0   Oak (W/F in millimeters) 0   Cocklebur (W/F in millimeters) 0   [place] (W/F in millimeters) 7/13   White Doug (W/F in millimeters) 5/10   Careless (W/F in millimeters) 0   Nettle (W/F in millimeters) 5/7   English Plantain (W/F in millimeters) 0   Kochia (W/F in millimeters) 0   Lamb's Quarter (W/F in millimeters) 0   Marshelder (W/F in millimeters) 0   Ragweed Mix* ALK (W/F in millimeters) 12/27   Russian Thistle (W/F in millimeters) 0   Sagebrush/Mugwort (W/F in millimeters) 0   Sheep Sorrel (W/F in millimeters) 0   Feather Mix* ALK (W/F in millimeters) 0   Penicillium Mix (1:10 w/v) 0   Curvularia spicifera (1:10 w/v) 0   Epicoccum (1:10 w/v) 0   Aspergillus fumigatus (1:10 w/v): 0   Alternaria tenius (1:10 w/v) 0   H. Cladosporium (1:10 w/v) 0   Phoma herbarum (1:10 w/v) 0      FOOD ALLERGEN PERCUTANEOUS SKIN TESTING  [employer]  11/9/2021   Consent Y   Shrimp 1:20 (W/F in millimeters) 0   Lobster 1:20 (W/F in millimeters) 0   Crab 1:20 (W/F in millimeters) 0   Clam 1:20 (W/F in millimeters) 0   Oyster 1:20 (W/F in millimeters) 5/7   Scallops 1:20 (W/F in millimeters) 0   Tuna  1:20 (W/F in millimeters) 7/10   Cod 1:20 (W/F in millimeters) 20/30   [place]  1:20 (W/F in millimeters) 20/35

## 2021-11-09 NOTE — PROGRESS NOTES
Per provider verbal order, placed Adult Environmental Panel, Shellfish Panel, Tuna, Cod, [place] scratch test.  Consent was obtained prior to procedure.  Once panels were placed, patient was monitored for 15 minutes in clinic.  Provider read test after 15 minutes..  Pt tolerated procedure well.  All questions and concerns were addressed at office visit.     SILAS EstradaN, RN

## 2021-11-21 ENCOUNTER — HEALTH MAINTENANCE LETTER (OUTPATIENT)
Age: 36
End: 2021-11-21

## 2021-12-06 ENCOUNTER — E-VISIT (OUTPATIENT)
Dept: URGENT CARE | Facility: CLINIC | Age: 36
End: 2021-12-06
Payer: COMMERCIAL

## 2021-12-06 ENCOUNTER — MYC REFILL (OUTPATIENT)
Dept: FAMILY MEDICINE | Facility: CLINIC | Age: 36
End: 2021-12-06

## 2021-12-06 DIAGNOSIS — Z91.013 FISH ALLERGY: ICD-10-CM

## 2021-12-06 DIAGNOSIS — R21 RASH: Primary | ICD-10-CM

## 2021-12-06 DIAGNOSIS — Z91.013 SHELLFISH ALLERGY: Primary | ICD-10-CM

## 2021-12-06 PROCEDURE — 99207 PR NON-BILLABLE SERV PER CHARTING: CPT | Performed by: FAMILY MEDICINE

## 2021-12-06 NOTE — PATIENT INSTRUCTIONS
Dear Ottoniel Mccormack,    We are sorry you are not feeling well. Based on the responses you provided, it is recommended that you be seen in-person in urgent care so we can better evaluate your symptoms. Please click here to find the nearest urgent care location to you.   You will not be charged for this Visit. Thank you for trusting us with your care.    Stacy Cortes MD

## 2021-12-07 RX ORDER — EPINEPHRINE 0.3 MG/.3ML
INJECTION SUBCUTANEOUS
Qty: 2 EACH | Refills: 1 | Status: SHIPPED | OUTPATIENT
Start: 2021-12-07 | End: 2023-06-29

## 2021-12-07 NOTE — TELEPHONE ENCOUNTER
"Requested Prescriptions   Pending Prescriptions Disp Refills     EPINEPHrine (ANY BX GENERIC EQUIV) 0.3 MG/0.3ML injection 2-pack 1 each 0     Sig: Inject 0.3 mLs (0.3 mg) into the muscle       Anaphylaxis Kits Protocol Passed - 12/7/2021  4:22 PM        Passed - Recent (12 mo) or future (30 days) visit witin the authorizing provider's specialty     Patient has had an office visit with the authorizing provider or a provider within the authorizing providers department within the previous 12 mos or has a future within next 30 days. See \"Patient Info\" tab in inbasket, or \"Choose Columns\" in Meds & Orders section of the refill encounter.              Passed - Patient is age 5 or older        Passed - Medication is active on med list           Epi-pen previously prescribed by Perla Nieto. Forwarding to Dr. Barnes for approval.     Last office visit: 11/9/21    SILAS EstradaN, RN    "

## 2021-12-07 NOTE — TELEPHONE ENCOUNTER
Patient recently saw allergist at [facility].  Routing to care team.  Angely Bennett RN

## 2022-01-06 ENCOUNTER — TELEPHONE (OUTPATIENT)
Dept: FAMILY MEDICINE | Facility: CLINIC | Age: 37
End: 2022-01-06
Payer: COMMERCIAL

## 2022-01-06 DIAGNOSIS — F32.1 MODERATE MAJOR DEPRESSION (H): ICD-10-CM

## 2022-01-06 DIAGNOSIS — F43.10 PTSD (POST-TRAUMATIC STRESS DISORDER): ICD-10-CM

## 2022-01-06 DIAGNOSIS — F41.9 ANXIETY: ICD-10-CM

## 2022-01-10 RX ORDER — ESCITALOPRAM OXALATE 20 MG/1
20 TABLET ORAL DAILY
Qty: 30 TABLET | Refills: 0 | Status: SHIPPED | OUTPATIENT
Start: 2022-01-10 | End: 2022-02-01

## 2022-01-10 NOTE — TELEPHONE ENCOUNTER
Routing refill request to provider for review/approval because:  Labs not current:  PHQ9  Jessica Fang RN

## 2022-01-31 ENCOUNTER — TELEPHONE (OUTPATIENT)
Dept: FAMILY MEDICINE | Facility: CLINIC | Age: 37
End: 2022-01-31
Payer: COMMERCIAL

## 2022-01-31 DIAGNOSIS — F43.10 PTSD (POST-TRAUMATIC STRESS DISORDER): ICD-10-CM

## 2022-01-31 DIAGNOSIS — F32.1 MODERATE MAJOR DEPRESSION (H): ICD-10-CM

## 2022-01-31 DIAGNOSIS — F41.9 ANXIETY: ICD-10-CM

## 2022-02-01 RX ORDER — ESCITALOPRAM OXALATE 20 MG/1
20 TABLET ORAL DAILY
Qty: 15 TABLET | Refills: 0 | Status: SHIPPED | OUTPATIENT
Start: 2022-02-01 | End: 2022-06-17

## 2022-02-01 NOTE — TELEPHONE ENCOUNTER
"Routing refill request to provider for review/approval because:  Patient needs to be seen because:  no further refills without follow up  PHQ 9    Requested Prescriptions   Pending Prescriptions Disp Refills    escitalopram (LEXAPRO) 20 MG tablet 30 tablet 0     Sig: Take 1 tablet (20 mg) by mouth daily ++NO FURTHER REFILLS WITHOUT FOLLOW UP+++        SSRIs Protocol Failed - 1/31/2022 11:06 AM        Failed - PHQ-9 score less than 5 in past 6 months     Please review last PHQ-9 score.           Passed - Medication is active on med list        Passed - Patient is age 18 or older        Passed - No active pregnancy on record        Passed - No positive pregnancy test in last 12 months        Passed - Recent (6 mo) or future (30 days) visit within the authorizing provider's specialty     Patient had office visit in the last 6 months or has a visit in the next 30 days with authorizing provider or within the authorizing provider's specialty.  See \"Patient Info\" tab in inbasket, or \"Choose Columns\" in Meds & Orders section of the refill encounter.                      "

## 2022-02-01 NOTE — TELEPHONE ENCOUNTER
Given 15 tablets. No further refills without follow up with us- may be video visit -assist with scheduling

## 2022-02-02 NOTE — TELEPHONE ENCOUNTER
Called patient and left voicemail to create either a virtual or in person medication follow up appt.

## 2022-03-25 ENCOUNTER — E-VISIT (OUTPATIENT)
Dept: FAMILY MEDICINE | Facility: CLINIC | Age: 37
End: 2022-03-25
Payer: COMMERCIAL

## 2022-03-25 DIAGNOSIS — G43.009 MIGRAINE WITHOUT AURA AND WITHOUT STATUS MIGRAINOSUS, NOT INTRACTABLE: Primary | ICD-10-CM

## 2022-03-25 PROCEDURE — 99421 OL DIG E/M SVC 5-10 MIN: CPT | Performed by: PHYSICIAN ASSISTANT

## 2022-03-25 RX ORDER — SUMATRIPTAN 50 MG/1
50 TABLET, FILM COATED ORAL
Qty: 9 TABLET | Refills: 0 | Status: SHIPPED | OUTPATIENT
Start: 2022-03-25 | End: 2022-04-21

## 2022-04-21 DIAGNOSIS — G43.009 MIGRAINE WITHOUT AURA AND WITHOUT STATUS MIGRAINOSUS, NOT INTRACTABLE: ICD-10-CM

## 2022-04-21 RX ORDER — SUMATRIPTAN 50 MG/1
50 TABLET, FILM COATED ORAL
Qty: 9 TABLET | Refills: 0 | Status: SHIPPED | OUTPATIENT
Start: 2022-04-21 | End: 2022-06-24

## 2022-06-03 DIAGNOSIS — J45.30 MILD PERSISTENT ASTHMA WITHOUT COMPLICATION: ICD-10-CM

## 2022-06-03 RX ORDER — MONTELUKAST SODIUM 10 MG/1
TABLET ORAL
Qty: 90 TABLET | Refills: 0 | Status: SHIPPED | OUTPATIENT
Start: 2022-06-03 | End: 2022-06-24

## 2022-06-03 NOTE — TELEPHONE ENCOUNTER
"Requested Prescriptions   Pending Prescriptions Disp Refills    montelukast (SINGULAIR) 10 MG tablet [Pharmacy Med Name: MONTELUKAST SOD 10 MG TABLET] 90 tablet 1     Sig: TAKE 1 TABLET BY MOUTH EVERYDAY AT BEDTIME        Leukotriene Inhibitors Protocol Failed - 6/3/2022 12:38 AM        Failed - Asthma control assessment score within normal limits in last 6 months     Please review ACT score.           Passed - Patient is age 12 or older     If patient is under 16, ok to refill using age based dosing.           Passed - Medication is active on med list        Passed - Recent (6 mo) or future (30 days) visit within the authorizing provider's specialty     Patient had office visit in the last 6 months or has a visit in the next 30 days with authorizing provider or within the authorizing provider's specialty.  See \"Patient Info\" tab in inbasket, or \"Choose Columns\" in Meds & Orders section of the refill encounter.                  "

## 2022-06-17 ENCOUNTER — OFFICE VISIT (OUTPATIENT)
Dept: OBGYN | Facility: CLINIC | Age: 37
End: 2022-06-17
Payer: COMMERCIAL

## 2022-06-17 VITALS
WEIGHT: 178.2 LBS | SYSTOLIC BLOOD PRESSURE: 122 MMHG | DIASTOLIC BLOOD PRESSURE: 78 MMHG | HEIGHT: 66 IN | OXYGEN SATURATION: 100 % | HEART RATE: 86 BPM | BODY MASS INDEX: 28.64 KG/M2

## 2022-06-17 DIAGNOSIS — Z31.41 FERTILITY TESTING: ICD-10-CM

## 2022-06-17 DIAGNOSIS — E55.9 VITAMIN D DEFICIENCY: ICD-10-CM

## 2022-06-17 DIAGNOSIS — F43.10 PTSD (POST-TRAUMATIC STRESS DISORDER): ICD-10-CM

## 2022-06-17 DIAGNOSIS — Z23 NEED FOR TDAP VACCINATION: ICD-10-CM

## 2022-06-17 DIAGNOSIS — Z01.419 WELL FEMALE EXAM WITH ROUTINE GYNECOLOGICAL EXAM: Primary | ICD-10-CM

## 2022-06-17 DIAGNOSIS — Z11.3 SCREENING FOR VENEREAL DISEASE: ICD-10-CM

## 2022-06-17 DIAGNOSIS — F32.1 MODERATE MAJOR DEPRESSION (H): ICD-10-CM

## 2022-06-17 DIAGNOSIS — Z13.6 CARDIOVASCULAR SCREENING; LDL GOAL LESS THAN 160: ICD-10-CM

## 2022-06-17 DIAGNOSIS — Z12.4 ENCOUNTER FOR SCREENING FOR CERVICAL CANCER: ICD-10-CM

## 2022-06-17 DIAGNOSIS — F41.9 ANXIETY: ICD-10-CM

## 2022-06-17 LAB
CHOLEST SERPL-MCNC: 137 MG/DL
FASTING STATUS PATIENT QL REPORTED: NO
FASTING STATUS PATIENT QL REPORTED: NO
GLUCOSE BLD-MCNC: 77 MG/DL (ref 70–99)
HDLC SERPL-MCNC: 83 MG/DL
LDLC SERPL CALC-MCNC: 41 MG/DL
NONHDLC SERPL-MCNC: 54 MG/DL
TRIGL SERPL-MCNC: 65 MG/DL

## 2022-06-17 PROCEDURE — G0145 SCR C/V CYTO,THINLAYER,RESCR: HCPCS | Performed by: OBSTETRICS & GYNECOLOGY

## 2022-06-17 PROCEDURE — 82947 ASSAY GLUCOSE BLOOD QUANT: CPT | Performed by: OBSTETRICS & GYNECOLOGY

## 2022-06-17 PROCEDURE — 86803 HEPATITIS C AB TEST: CPT | Performed by: OBSTETRICS & GYNECOLOGY

## 2022-06-17 PROCEDURE — 90715 TDAP VACCINE 7 YRS/> IM: CPT | Performed by: OBSTETRICS & GYNECOLOGY

## 2022-06-17 PROCEDURE — 36415 COLL VENOUS BLD VENIPUNCTURE: CPT | Performed by: OBSTETRICS & GYNECOLOGY

## 2022-06-17 PROCEDURE — 87491 CHLMYD TRACH DNA AMP PROBE: CPT | Performed by: OBSTETRICS & GYNECOLOGY

## 2022-06-17 PROCEDURE — 87624 HPV HI-RISK TYP POOLED RSLT: CPT | Performed by: OBSTETRICS & GYNECOLOGY

## 2022-06-17 PROCEDURE — 90471 IMMUNIZATION ADMIN: CPT | Performed by: OBSTETRICS & GYNECOLOGY

## 2022-06-17 PROCEDURE — 86780 TREPONEMA PALLIDUM: CPT | Performed by: OBSTETRICS & GYNECOLOGY

## 2022-06-17 PROCEDURE — 99212 OFFICE O/P EST SF 10 MIN: CPT | Mod: 25 | Performed by: OBSTETRICS & GYNECOLOGY

## 2022-06-17 PROCEDURE — 80061 LIPID PANEL: CPT | Performed by: OBSTETRICS & GYNECOLOGY

## 2022-06-17 PROCEDURE — 87591 N.GONORRHOEAE DNA AMP PROB: CPT | Performed by: OBSTETRICS & GYNECOLOGY

## 2022-06-17 PROCEDURE — 87340 HEPATITIS B SURFACE AG IA: CPT | Performed by: OBSTETRICS & GYNECOLOGY

## 2022-06-17 PROCEDURE — 82306 VITAMIN D 25 HYDROXY: CPT | Performed by: OBSTETRICS & GYNECOLOGY

## 2022-06-17 PROCEDURE — 87389 HIV-1 AG W/HIV-1&-2 AB AG IA: CPT | Performed by: OBSTETRICS & GYNECOLOGY

## 2022-06-17 PROCEDURE — 99385 PREV VISIT NEW AGE 18-39: CPT | Mod: 25 | Performed by: OBSTETRICS & GYNECOLOGY

## 2022-06-17 RX ORDER — ESCITALOPRAM OXALATE 20 MG/1
20 TABLET ORAL DAILY
Qty: 15 TABLET | Refills: 0 | Status: SHIPPED | OUTPATIENT
Start: 2022-06-17 | End: 2022-06-24

## 2022-06-17 ASSESSMENT — ANXIETY QUESTIONNAIRES
7. FEELING AFRAID AS IF SOMETHING AWFUL MIGHT HAPPEN: MORE THAN HALF THE DAYS
IF YOU CHECKED OFF ANY PROBLEMS ON THIS QUESTIONNAIRE, HOW DIFFICULT HAVE THESE PROBLEMS MADE IT FOR YOU TO DO YOUR WORK, TAKE CARE OF THINGS AT HOME, OR GET ALONG WITH OTHER PEOPLE: SOMEWHAT DIFFICULT
3. WORRYING TOO MUCH ABOUT DIFFERENT THINGS: MORE THAN HALF THE DAYS
1. FEELING NERVOUS, ANXIOUS, OR ON EDGE: SEVERAL DAYS
GAD7 TOTAL SCORE: 11
GAD7 TOTAL SCORE: 11
6. BECOMING EASILY ANNOYED OR IRRITABLE: SEVERAL DAYS
2. NOT BEING ABLE TO STOP OR CONTROL WORRYING: MORE THAN HALF THE DAYS
5. BEING SO RESTLESS THAT IT IS HARD TO SIT STILL: MORE THAN HALF THE DAYS

## 2022-06-17 ASSESSMENT — PATIENT HEALTH QUESTIONNAIRE - PHQ9
5. POOR APPETITE OR OVEREATING: SEVERAL DAYS
SUM OF ALL RESPONSES TO PHQ QUESTIONS 1-9: 10

## 2022-06-17 NOTE — PATIENT INSTRUCTIONS
If you have labs or imaging done, the results will automatically release in [employer] without an interpretation.  Your health care professional will review those results and send an interpretation with recommendations as soon as possible, but this may be 1-3 business days.    If you have any questions regarding your visit, please contact your care team.     [employer] [facility]: [phone]  [facility] CLINIC HOURS TELEPHONE NUMBER       Lamar Hernandez MD  Assistant Medical Director    Renu - Certified Medical Assistant     Geoff Lara-PRERNA Barnett-  Indu-     Monday- [place]  8:00 a.m - 5:00 p.m    Tuesday- Surgery        Thursday- [place]  8:00 a.m - 5:00 p.m.    Friday- [place]  7:30 a.m - 4:00 p.m. [facility]  [address]  [place] [address]  [phone] Fax  Imaging Scheduling [phone]    [facility]  [address] [place] [address] Dr.  [place], [place] [address] [phone]    [facility]  [address] [place], [place] [address] [phone] Fax  Imaging Scheduling [phone]     Urgent Care locations:  [facility] Monday-Friday  10 am - 8 pm  Saturday and Sunday   9 am - 5 pm  Monday-Friday   10 am - 8 pm  Saturday and Sunday   9 am - 5 pm   [phone]     **Surgeries** Our Surgery Schedulers will contact you to schedule. If you do not receive a call within 3 business days, please call [phone].    If you need a medication refill, please contact your pharmacy. Please allow 3 business days for your refill to be completed.    As always, thank you for trusting us with your healthcare needs!

## 2022-06-17 NOTE — PROGRESS NOTES
SUBJECTIVE:   CC: Ottoniel Mccormack is an 36 year old woman who presents for preventive health visit.       Patient has been advised of split billing requirements and indicates understanding: Yes  Healthy Habits:    Getting at least 3 servings of Calcium per day:  Yes    Bi-annual eye exam:  Yes    Dental care twice a year:  Yes    Sleep apnea or symptoms of sleep apnea:  None    Diet:  Regular (no restrictions)    Frequency of exercise:  2-3 days/week    Duration of exercise:  30-45 minutes    Taking medications regularly:  No    Barriers to taking medications:  None    Medication side effects:  None    PHQ-2 Total Score:    Additional concerns today:  No     - question about fertility.  Ovulation predictor kits showed ovulation. She tried for two years, about five years ago.    Contraception: Not currently sexually active.     History of LEEP - Pap due today.      Asthma - management per her PCP, who also manages the Lexapro.  She has been out of medications for about a month.  She is scheduled with her PCP next week.  Also worsening eczema    Today's PHQ-2 Score:   PHQ-2 (  [employer]) 2021   PHQ-2 Score Incomplete       Abuse: Current or Past (Physical, Sexual or Emotional) - No  Do you feel safe in your environment? Yes        Social History     Tobacco Use     Smoking status: Never Smoker     Smokeless tobacco: Never Used   Substance Use Topics     Alcohol use: Yes     Comment: once a week     If you drink alcohol do you typically have >3 drinks per day or >7 drinks per week? No    No flowsheet data found.    BP Readings from Last 3 Encounters:   22 122/78   21 125/72   21 112/60    Wt Readings from Last 3 Encounters:   22 80.8 kg (178 lb 3.2 oz)   21 79.4 kg (175 lb)   21 77.9 kg (171 lb 12.8 oz)                  Patient Active Problem List   Diagnosis     Cervical cancer screening     Past Surgical History:   Procedure Laterality Date     LEEP TX, CERVICAL          Social History     Tobacco Use     Smoking status: Never Smoker     Smokeless tobacco: Never Used   Substance Use Topics     Alcohol use: Yes     Comment: once a week     Family History   Problem Relation Age of Onset     Hypertension Mother      Other - See Comments Father 67        Julia - thinks pancreatic cancer      No Known Problems Brother      Diabetes Maternal Grandmother         adult      No Known Problems Brother      No Known Problems Brother      No Known Problems Brother      No Known Problems Brother      Breast Cancer No family hx of      Colon Cancer No family hx of          Current Outpatient Medications   Medication Sig Dispense Refill     albuterol (PROAIR HFA) 108 (90 Base) MCG/ACT inhaler INHALE 1 TO 2 PUFFS BY MOUTH EVERY 6 HOURS AS NEEDED FOR WHEEZING 18 g 4     EPINEPHrine (ANY BX GENERIC EQUIV) 0.3 MG/0.3ML injection 2-pack Inject into the muscle as directed for anaphylaxis 2 each 1     escitalopram (LEXAPRO) 20 MG tablet Take 1 tablet (20 mg) by mouth daily ++NO FURTHER REFILLS WITHOUT FOLLOW UP+++ 15 tablet 0     fluticasone (FLONASE) 50 MCG/ACT nasal spray SHAKE LQ AND U 1 TO 2 SPRAYS IEN D       montelukast (SINGULAIR) 10 MG tablet TAKE 1 TABLET BY MOUTH EVERYDAY AT BEDTIME 90 tablet 0     QVAR REDIHALER 80 MCG/ACT inhaler Inhale 1 puff into the lungs 2 times daily 10.6 g 5     SUMAtriptan (IMITREX) 50 MG tablet TAKE 1 TABLET (50 MG) BY MOUTH AT ONSET OF HEADACHE FOR MIGRAINE MAY REPEAT IN 2 HOURS. MAX 4 TABLETS/24 HOURS. 9 tablet 0     traZODone (DESYREL) 50 MG tablet 1-2 at bedtime as needed for sleep. 60 tablet 0     triamcinolone (KENALOG) 0.1 % external ointment Apply topically 2 times daily For 14 days - not use for 14 days then repeat as needed 80 g 1     Allergies   Allergen Reactions     Fish Anaphylaxis     Shellfish Allergy Hives     No lab results found.          History of abnormal Pap smear: YES - updated in Problem List and Health Maintenance accordingly       "  Review of Systems  CONSTITUTIONAL: NEGATIVE for fever, chills, change in weight  INTEGUMENTARU/SKIN: NEGATIVE for worrisome rashes, moles or lesions  EYES: NEGATIVE for vision changes or irritation  ENT: NEGATIVE for ear, mouth and throat problems  RESP: NEGATIVE for significant cough or SOB  BREAST: NEGATIVE for masses, tenderness or discharge  CV: NEGATIVE for chest pain, palpitations or peripheral edema  GI: NEGATIVE for nausea, abdominal pain, heartburn, or change in bowel habits  : NEGATIVE for unusual urinary or vaginal symptoms. Periods are regular, but heavier in the last few years.  When they are heavy, she is changing a ultra tampon every 2 hours.  MUSCULOSKELETAL: NEGATIVE for significant arthralgias or myalgia  NEURO: NEGATIVE for weakness, dizziness or paresthesias  PSYCHIATRIC: NEGATIVE for changes in mood or affect     OBJECTIVE:   /78 (BP Location: Right arm, Cuff Size: Adult Regular)   Pulse 86   Ht 1.664 m (5' 5.5\")   Wt 80.8 kg (178 lb 3.2 oz)   LMP 06/11/2022 (Approximate)   SpO2 100%   Breastfeeding No   BMI 29.20 kg/m    Physical Exam  Gen: Alert and oriented times 3, no acute distress.  Well developed, well nourished, pleasant.    Neck: Supple, no masses.  No thyromegaly.  Breast: Symmetrical without lesions.  No dimpling, nipple discharge, or discrete masses.  No lymphadenopathy.  Chest:  Non labored.  Clear to auscultation bilaterally.    Heart: Regular, normal S1, S2.  No murmurs.   Abdomen: Soft, nontender, nondistended.  No hepatosplenomegaly.    :  Normal female external genitalia.  No lesions.  Urethral meatus normal.  Speculum exam reveals a normal vaginal vault, normal cervix .  No abnormal discharge.  Bimanual exam reveals a nontender uterus .  It is somewhat bulky and enlarged.  No cervical motion tenderness.  Adnexa nontender with no palpable masses.    Extremities:  Nontender, no edema.    Pap obtained:  Yes     ASSESSMENT/PLAN:       ICD-10-CM    1. Well female " exam with routine gynecological exam  Z01.419 Glucose     Glucose   2. Vitamin D deficiency  E55.9 Vitamin D Deficiency     Vitamin D Deficiency   3. Encounter for screening for cervical cancer  Z12.4 Pap screen with HPV - recommended age 30 - 65 years   4. CARDIOVASCULAR SCREENING; LDL GOAL LESS THAN 160  Z13.6 Lipid panel reflex to direct LDL Non-fasting     Lipid panel reflex to direct LDL Non-fasting   5. Screening for venereal disease  Z11.3 Chlamydia trachomatis PCR     Hepatitis B surface antigen     Hepatitis C antibody     HIV Antigen Antibody Combo     Neisseria gonorrhoeae PCR     Treponema Abs w Reflex to RPR and Titer     Treponema Abs w Reflex to RPR and Titer     HIV Antigen Antibody Combo     Hepatitis C antibody     Hepatitis B surface antigen   6. Fertility testing  Z31.41 US Pelvic Complete with Transvaginal   7. Need for Tdap vaccination  Z23 TDAP VACCINE (Adacel, Boostrix)  [[id number]]   8. Anxiety  F41.9 escitalopram (LEXAPRO) 20 MG tablet   9. PTSD (post-traumatic stress disorder)  F43.10 escitalopram (LEXAPRO) 20 MG tablet   10. Moderate major depression (H)  F32.1 escitalopram (LEXAPRO) 20 MG tablet       Briefly reviewed fertility work-up.  She has tried to get pregnant for 2 years without success, about 5 years ago, so is interested in a work-up now.  Plan to obtain ultrasound.  I am suspicious of fibroids.  We will also plan day 21 and then day 3 labs.  Plan to have her follow-up for a visit to discuss these results and management options.    STI testing per patient request today.  No known exposure or symptoms.    She will keep her appointment with Perla Nieto scheduled for next week to discuss Lexapro.  I gave her a small refill to get her started.  She has been off of it for about a month and may experience some nausea when she restarts it.  If that happens, she will take half the dose and talk with Perla.  She will also discuss asthma and eczema at that time.  PHQ-9 done today  "and patient is feeling okay.    COUNSELING:  Reviewed preventive health counseling, as reflected in patient instructions    Estimated body mass index is 29.2 kg/m  as calculated from the following:    Height as of this encounter: 1.664 m (5' 5.5\").    Weight as of this encounter: 80.8 kg (178 lb 3.2 oz).        She reports that she has never smoked. She has never used smokeless tobacco.      Counseling Resources:  ATP IV Guidelines  Pooled Cohorts Equation Calculator  Breast Cancer Risk Calculator  BRCA-Related Cancer Risk Assessment: FHS-7 Tool  FRAX Risk Assessment  ICSI Preventive Guidelines  Dietary Guidelines for Americans, 2010  USDA's MyPlate  ASA Prophylaxis  Lung CA Screening    Lamar Hernandez MD  [facility] [place]  "

## 2022-06-18 LAB
C TRACH DNA SPEC QL NAA+PROBE: NEGATIVE
DEPRECATED CALCIDIOL+CALCIFEROL SERPL-MC: 28 UG/L (ref 20–75)
HBV SURFACE AG SERPL QL IA: NONREACTIVE
HCV AB SERPL QL IA: NONREACTIVE
HIV 1+2 AB+HIV1 P24 AG SERPL QL IA: NONREACTIVE
N GONORRHOEA DNA SPEC QL NAA+PROBE: NEGATIVE
T PALLIDUM AB SER QL: NONREACTIVE

## 2022-06-20 ENCOUNTER — MYC MEDICAL ADVICE (OUTPATIENT)
Dept: OBGYN | Facility: CLINIC | Age: 37
End: 2022-06-20
Payer: COMMERCIAL

## 2022-06-20 DIAGNOSIS — Z31.41 FERTILITY TESTING: Primary | ICD-10-CM

## 2022-06-20 NOTE — TELEPHONE ENCOUNTER
"Per 6/17 lab result note:  \"Your vitamin D level is on the low side of normal so I recommend you continue vitamin D supplement.\"    Pt has been taking a Women's multivitamin daily that contains 25mcg of Vitamin D.  She has been taking this for 2 months.    Pt is writing in after reading her result note and asking for an rx for Vitamin D.    Routing to Dr. Hernandez.    Jane Velez RN    "

## 2022-06-20 NOTE — RESULT ENCOUNTER NOTE
Hi,    Your labs are within normal limits.  Your vitamin D level is on the low side of normal so I recommend you continue vitamin D supplement.  We will contact you when we have the results of the Pap smear.  Please call to schedule your ultrasound.  Also, please schedule a lab-only appointment for day 21 of your cycle this month.  I recommend a follow-up appointment at the end of July or August to follow-up on these results.  Unfortunately, my schedule fills up fairly quickly, so I recommend you schedule that appointment as soon as possible.    Thanks!  Dr. Hernandez

## 2022-06-21 LAB
BKR LAB AP GYN ADEQUACY: NORMAL
BKR LAB AP GYN INTERPRETATION: NORMAL
BKR LAB AP HPV REFLEX: NORMAL
BKR LAB AP LMP: NORMAL
BKR LAB AP PREVIOUS ABNORMAL: NORMAL
PATH REPORT.COMMENTS IMP SPEC: NORMAL
PATH REPORT.COMMENTS IMP SPEC: NORMAL
PATH REPORT.RELEVANT HX SPEC: NORMAL

## 2022-06-22 LAB
HUMAN PAPILLOMA VIRUS 16 DNA: NEGATIVE
HUMAN PAPILLOMA VIRUS 18 DNA: NEGATIVE
HUMAN PAPILLOMA VIRUS FINAL DIAGNOSIS: NORMAL
HUMAN PAPILLOMA VIRUS OTHER HR: NEGATIVE

## 2022-06-24 ENCOUNTER — OFFICE VISIT (OUTPATIENT)
Dept: FAMILY MEDICINE | Facility: CLINIC | Age: 37
End: 2022-06-24
Payer: COMMERCIAL

## 2022-06-24 VITALS
DIASTOLIC BLOOD PRESSURE: 77 MMHG | WEIGHT: 178.4 LBS | RESPIRATION RATE: 16 BRPM | SYSTOLIC BLOOD PRESSURE: 125 MMHG | TEMPERATURE: 98.8 F | OXYGEN SATURATION: 100 % | HEART RATE: 90 BPM | BODY MASS INDEX: 29.24 KG/M2

## 2022-06-24 DIAGNOSIS — G47.00 INSOMNIA, UNSPECIFIED TYPE: ICD-10-CM

## 2022-06-24 DIAGNOSIS — G43.009 MIGRAINE WITHOUT AURA AND WITHOUT STATUS MIGRAINOSUS, NOT INTRACTABLE: ICD-10-CM

## 2022-06-24 DIAGNOSIS — J45.30 MILD PERSISTENT ASTHMA WITHOUT COMPLICATION: ICD-10-CM

## 2022-06-24 DIAGNOSIS — J30.9 ALLERGIC RHINITIS, UNSPECIFIED SEASONALITY, UNSPECIFIED TRIGGER: ICD-10-CM

## 2022-06-24 DIAGNOSIS — F32.1 MODERATE MAJOR DEPRESSION (H): Primary | ICD-10-CM

## 2022-06-24 DIAGNOSIS — F43.10 PTSD (POST-TRAUMATIC STRESS DISORDER): ICD-10-CM

## 2022-06-24 DIAGNOSIS — F41.9 ANXIETY: ICD-10-CM

## 2022-06-24 PROCEDURE — 99214 OFFICE O/P EST MOD 30 MIN: CPT | Performed by: PHYSICIAN ASSISTANT

## 2022-06-24 RX ORDER — FLUTICASONE PROPIONATE 50 MCG
1 SPRAY, SUSPENSION (ML) NASAL DAILY
Qty: 16 G | Refills: 3 | Status: SHIPPED | OUTPATIENT
Start: 2022-06-24 | End: 2024-05-24

## 2022-06-24 RX ORDER — ESCITALOPRAM OXALATE 20 MG/1
20 TABLET ORAL DAILY
Qty: 90 TABLET | Refills: 1 | Status: SHIPPED | OUTPATIENT
Start: 2022-06-24 | End: 2023-02-17

## 2022-06-24 RX ORDER — BECLOMETHASONE DIPROPIONATE HFA 80 UG/1
1 AEROSOL, METERED RESPIRATORY (INHALATION) 2 TIMES DAILY
Qty: 10.6 G | Refills: 5 | Status: SHIPPED | OUTPATIENT
Start: 2022-06-24 | End: 2023-06-29

## 2022-06-24 RX ORDER — SUMATRIPTAN 50 MG/1
50 TABLET, FILM COATED ORAL
Qty: 9 TABLET | Refills: 0 | Status: SHIPPED | OUTPATIENT
Start: 2022-06-24 | End: 2023-06-29

## 2022-06-24 RX ORDER — TRAZODONE HYDROCHLORIDE 50 MG/1
50 TABLET, FILM COATED ORAL AT BEDTIME
Qty: 90 TABLET | Refills: 1 | Status: SHIPPED | OUTPATIENT
Start: 2022-06-24 | End: 2022-06-25

## 2022-06-24 RX ORDER — ALBUTEROL SULFATE 90 UG/1
AEROSOL, METERED RESPIRATORY (INHALATION)
Qty: 18 G | Refills: 4 | Status: SHIPPED | OUTPATIENT
Start: 2022-06-24 | End: 2023-01-24

## 2022-06-24 RX ORDER — MONTELUKAST SODIUM 10 MG/1
TABLET ORAL
Qty: 90 TABLET | Refills: 1 | Status: SHIPPED | OUTPATIENT
Start: 2022-06-24 | End: 2023-06-15

## 2022-06-24 ASSESSMENT — ANXIETY QUESTIONNAIRES
5. BEING SO RESTLESS THAT IT IS HARD TO SIT STILL: SEVERAL DAYS
2. NOT BEING ABLE TO STOP OR CONTROL WORRYING: SEVERAL DAYS
8. IF YOU CHECKED OFF ANY PROBLEMS, HOW DIFFICULT HAVE THESE MADE IT FOR YOU TO DO YOUR WORK, TAKE CARE OF THINGS AT HOME, OR GET ALONG WITH OTHER PEOPLE?: SOMEWHAT DIFFICULT
GAD7 TOTAL SCORE: 7
1. FEELING NERVOUS, ANXIOUS, OR ON EDGE: SEVERAL DAYS
7. FEELING AFRAID AS IF SOMETHING AWFUL MIGHT HAPPEN: SEVERAL DAYS
6. BECOMING EASILY ANNOYED OR IRRITABLE: SEVERAL DAYS
7. FEELING AFRAID AS IF SOMETHING AWFUL MIGHT HAPPEN: SEVERAL DAYS
GAD7 TOTAL SCORE: 7
3. WORRYING TOO MUCH ABOUT DIFFERENT THINGS: SEVERAL DAYS
4. TROUBLE RELAXING: SEVERAL DAYS
GAD7 TOTAL SCORE: 7

## 2022-06-24 ASSESSMENT — PAIN SCALES - GENERAL: PAINLEVEL: NO PAIN (0)

## 2022-06-24 ASSESSMENT — PATIENT HEALTH QUESTIONNAIRE - PHQ9
SUM OF ALL RESPONSES TO PHQ QUESTIONS 1-9: 7
SUM OF ALL RESPONSES TO PHQ QUESTIONS 1-9: 7
10. IF YOU CHECKED OFF ANY PROBLEMS, HOW DIFFICULT HAVE THESE PROBLEMS MADE IT FOR YOU TO DO YOUR WORK, TAKE CARE OF THINGS AT HOME, OR GET ALONG WITH OTHER PEOPLE: SOMEWHAT DIFFICULT

## 2022-06-24 NOTE — PATIENT INSTRUCTIONS
Schedule pelvic ultrasound at [facility] ([phone]) formerly called [facility].    [phone] is the imaging number   Follow up with me in clinic in approximately 6 months  Let me know via MyChart if asthma symptoms not improving and will treat with burst of prednisone     Use over the counter hydrocortisone cream instead of triamcinolone  Try aquaphor

## 2022-06-24 NOTE — TELEPHONE ENCOUNTER
Pharmacy needs clarification or a new RX for trazodone due to mixed directions.    Sig - Route: Take 1 tablet (50 mg) by mouth At Bedtime 1-2 at bedtime as needed for sleep. - Oral    Jessica Fang RN

## 2022-06-24 NOTE — PROGRESS NOTES
Answers for HPI/ROS submitted by the patient on 6/24/2022  If you checked off any problems, how difficult have these problems made it for you to do your work, take care of things at home, or get along with other people?: Somewhat difficult  PHQ9 TOTAL SCORE: 7      Assessment & Plan     Moderate major depression (H)  Symptoms improved with lexapro.  Declines further med adjustment- follow up with us in 6 months   - escitalopram (LEXAPRO) 20 MG tablet  Dispense: 90 tablet; Refill: 1    Anxiety  Symptoms improved with lexapro- follow up with us in 6 months  - escitalopram (LEXAPRO) 20 MG tablet  Dispense: 90 tablet; Refill: 1    Mild persistent asthma without complication  Lungs clear- ?if symptoms related to increased humidity- message me in few days if not improving and consider burst of prednisone   - QVAR REDIHALER 80 MCG/ACT inhaler  Dispense: 10.6 g; Refill: 5  - montelukast (SINGULAIR) 10 MG tablet  Dispense: 90 tablet; Refill: 1  - albuterol (PROAIR HFA) 108 (90 Base) MCG/ACT inhaler  Dispense: 18 g; Refill: 4    PTSD (post-traumatic stress disorder)    - escitalopram (LEXAPRO) 20 MG tablet  Dispense: 90 tablet; Refill: 1    Insomnia, unspecified type  Using trazodone 50 mg with good success    Migraine without aura and without status migrainosus, not intractable  Refilled imitrex   - SUMAtriptan (IMITREX) 50 MG tablet  Dispense: 9 tablet; Refill: 0    Allergic rhinitis, unspecified seasonality, unspecified trigger    - fluticasone (FLONASE) 50 MCG/ACT nasal spray  Dispense: 16 g; Refill: 3      Prescription drug management         Patient Instructions   Schedule pelvic ultrasound at [facility] ([phone]) formerly called [facility].    [phone] is the imaging number   Follow up with me in clinic in approximately 6 months  Let me know via MyChart if asthma symptoms not improving and will treat with burst of prednisone     Use over the counter hydrocortisone cream  instead of triamcinolone  Try aquaphor       Return in about 6 months (around 12/24/2022), or if symptoms worsen or fail to improve, for in person.    Perla Nieto PA-C  [facility] DM Alcazar is a 36 year old, presenting for the following health issues:  Follow Up      History of Present Illness     Asthma:  She presents for follow up of asthma.  She has no cough, no wheezing, and some shortness of breath. She is using a relief medication daily. She does not miss any doses of her controller medication throughout the week.Patient is aware of the following triggers: dust mites, mold, pollens and smoke. The patient has not had a visit to the Emergency Room, Urgent Care or Hospital due to asthma since the last clinic visit.     Mental Health Follow-up:  Patient presents to follow-up on Depression & Anxiety.Patient's depression since last visit has been:  Better  The patient is not having other symptoms associated with depression.  Patient's anxiety since last visit has been:  Better  The patient is not having other symptoms associated with anxiety.  Any significant life events: No  Patient is not feeling anxious or having panic attacks.  Patient has no concerns about alcohol or drug use.    She eats 2-3 servings of fruits and vegetables daily.She consumes 1 sweetened beverage(s) daily.She exercises with enough effort to increase her heart rate 10 to 19 minutes per day.  She exercises with enough effort to increase her heart rate 3 or less days per week. She is missing 2 dose(s) of medications per week.    Today's PHQ-9         PHQ-9 Total Score: 7    PHQ-9 Q9 Thoughts of better off dead/self-harm past 2 weeks :   Not at all    How difficult have these problems made it for you to do your work, take care of things at home, or get along with other people: Somewhat difficult  Today's [place]-7 Score: 7     Out of lexapro for about a month- has been following ob for infertility and  restarted lexapro and feeling well   Sleep is better   No nausea or vomting  Started lexapro headache first few days and now improving   Asthma a bit more shortness of breath - dry cough- not waking up in middle of night or early in AM   Went to [place] and was sick for 2 weeks and lingering and voice still scratchy  No active cough any more but complains that voice raspy  Gets shortness of breath a little quicker these days - walking long distances -using  qvar twice a day- needs refll   imitrex - helpful- migraine approximately once a month        Review of Systems   Constitutional, HEENT, cardiovascular, pulmonary, gi and gu systems are negative, except as otherwise noted.      Objective    /77   Pulse 90   Temp 98.8  F (37.1  C) (Temporal)   Resp 16   Wt 80.9 kg (178 lb 6.4 oz)   LMP 06/11/2022 (Approximate)   SpO2 100%   BMI 29.24 kg/m    Body mass index is 29.24 kg/m .  Physical Exam   GENERAL: healthy, alert and no distress  NECK: no adenopathy, no asymmetry, masses, or scars and thyroid normal to palpation  RESP: lungs clear to auscultation - no rales, rhonchi or wheezes  CV: regular rate and rhythm, normal S1 S2, no S3 or S4, no murmur, click or rub, no peripheral edema and peripheral pulses strong  MS: no gross musculoskeletal defects noted, no edema  PSYCH: mentation appears normal, affect normal/bright, judgement and insight intact and appearance well groomed                    .  ..

## 2022-06-24 NOTE — TELEPHONE ENCOUNTER
Patient had office visit today.   Refill request received within 30 days of last office visit with pcp.  Prescription is routed to the provider to please address refill.   Angely Bennett RN

## 2022-06-25 RX ORDER — TRAZODONE HYDROCHLORIDE 50 MG/1
TABLET, FILM COATED ORAL
Qty: 180 TABLET | Refills: 1 | Status: SHIPPED | OUTPATIENT
Start: 2022-06-25 | End: 2023-03-20

## 2022-07-12 ENCOUNTER — TELEPHONE (OUTPATIENT)
Dept: FAMILY MEDICINE | Facility: CLINIC | Age: 37
End: 2022-07-12

## 2022-07-12 NOTE — TELEPHONE ENCOUNTER
Patient Quality Outreach    Patient is due for the following:   Asthma  -  ACT needed    NEXT STEPS:   No follow up needed at this time.    Type of outreach:    Sent [employer] message.      Questions for provider review:    None     Clement Thomas MA

## 2022-07-15 ENCOUNTER — ANCILLARY PROCEDURE (OUTPATIENT)
Dept: ULTRASOUND IMAGING | Facility: CLINIC | Age: 37
End: 2022-07-15
Attending: OBSTETRICS & GYNECOLOGY
Payer: COMMERCIAL

## 2022-07-15 DIAGNOSIS — Z31.41 FERTILITY TESTING: ICD-10-CM

## 2022-07-15 PROCEDURE — 76830 TRANSVAGINAL US NON-OB: CPT | Performed by: RADIOLOGY

## 2022-07-15 PROCEDURE — 76856 US EXAM PELVIC COMPLETE: CPT | Performed by: RADIOLOGY

## 2022-07-21 ENCOUNTER — E-VISIT (OUTPATIENT)
Dept: FAMILY MEDICINE | Facility: CLINIC | Age: 37
End: 2022-07-21
Payer: COMMERCIAL

## 2022-07-21 ENCOUNTER — MYC MEDICAL ADVICE (OUTPATIENT)
Dept: OBGYN | Facility: CLINIC | Age: 37
End: 2022-07-21

## 2022-07-21 DIAGNOSIS — R30.0 DYSURIA: Primary | ICD-10-CM

## 2022-07-21 PROCEDURE — 99421 OL DIG E/M SVC 5-10 MIN: CPT | Performed by: PHYSICIAN ASSISTANT

## 2022-07-25 ENCOUNTER — LAB (OUTPATIENT)
Dept: LAB | Facility: CLINIC | Age: 37
End: 2022-07-25
Payer: COMMERCIAL

## 2022-07-25 DIAGNOSIS — R30.0 DYSURIA: ICD-10-CM

## 2022-07-25 LAB
ALBUMIN UR-MCNC: NEGATIVE MG/DL
APPEARANCE UR: CLEAR
BILIRUB UR QL STRIP: NEGATIVE
COLOR UR AUTO: YELLOW
GLUCOSE UR STRIP-MCNC: NEGATIVE MG/DL
HGB UR QL STRIP: NEGATIVE
KETONES UR STRIP-MCNC: NEGATIVE MG/DL
LEUKOCYTE ESTERASE UR QL STRIP: NEGATIVE
NITRATE UR QL: NEGATIVE
PH UR STRIP: 7 [PH] (ref 5–7)
SP GR UR STRIP: 1.02 (ref 1–1.03)
UROBILINOGEN UR STRIP-ACNC: 0.2 E.U./DL

## 2022-07-25 PROCEDURE — 81003 URINALYSIS AUTO W/O SCOPE: CPT

## 2022-07-25 NOTE — RESULT ENCOUNTER NOTE
Dear Ottoniel   Your urinalysis was normal.   I will send you more information through your evisit.  Perla Nieto, PAC

## 2022-07-25 NOTE — PATIENT INSTRUCTIONS
Dear Ottoniel Mccormack    Your urinalysis was normal.  I suggest that you go to urgent care if any abdominal pain, fever, back pain, nausea, vomiting or other change in symptoms  I have placed a referral to urology.  Please call or MyChart my office with any questions or concerns.       Thanks for choosing us as your health care partner,    Perla Nieto PA-C

## 2022-07-29 NOTE — PATIENT INSTRUCTIONS
If you have labs or imaging done, the results will automatically release in [employer] without an interpretation.  Your health care professional will review those results and send an interpretation with recommendations as soon as possible, but this may be 1-3 business days.    If you have any questions regarding your visit, please contact your care team.     [employer] [facility]: [phone]  [facility] CLINIC HOURS TELEPHONE NUMBER       Lamar Hernandez MD  Assistant Medical Director    Renu - Certified Medical Assistant     Geoff Lara-PRERNA Barnett-  Indu-     Monday- [place]  8:00 a.m - 5:00 p.m    Tuesday- Surgery        Thursday- [place]  8:00 a.m - 5:00 p.m.    Friday- [place]  7:30 a.m - 4:00 p.m. [facility]  [address]  [place] [address]  [phone] Fax  Imaging Scheduling [phone]    [facility]  [address] [place] [address] Dr.  [place], [place] [address] [phone]    [facility]  [address] [place], [place] [address] [phone] Fax  Imaging Scheduling [phone]     Urgent Care locations:  [facility] Monday-Friday  10 am - 8 pm  Saturday and Sunday   9 am - 5 pm  Monday-Friday   10 am - 8 pm  Saturday and Sunday   9 am - 5 pm   [phone]     **Surgeries** Our Surgery Schedulers will contact you to schedule. If you do not receive a call within 3 business days, please call [phone].    If you need a medication refill, please contact your pharmacy. Please allow 3 business days for your refill to be completed.    As always, thank you for trusting us with your healthcare needs!

## 2022-08-01 ENCOUNTER — OFFICE VISIT (OUTPATIENT)
Dept: OBGYN | Facility: CLINIC | Age: 37
End: 2022-08-01
Payer: COMMERCIAL

## 2022-08-01 VITALS
HEART RATE: 88 BPM | WEIGHT: 181 LBS | BODY MASS INDEX: 29.66 KG/M2 | SYSTOLIC BLOOD PRESSURE: 117 MMHG | DIASTOLIC BLOOD PRESSURE: 66 MMHG

## 2022-08-01 DIAGNOSIS — D25.0 SUBMUCOUS AND SUBSEROUS LEIOMYOMA OF UTERUS: ICD-10-CM

## 2022-08-01 DIAGNOSIS — D25.2 SUBMUCOUS AND SUBSEROUS LEIOMYOMA OF UTERUS: ICD-10-CM

## 2022-08-01 DIAGNOSIS — N93.9 ABNORMAL UTERINE BLEEDING (AUB): Primary | ICD-10-CM

## 2022-08-01 PROCEDURE — 99214 OFFICE O/P EST MOD 30 MIN: CPT | Performed by: OBSTETRICS & GYNECOLOGY

## 2022-08-01 RX ORDER — TRANEXAMIC ACID 650 MG/1
1300 TABLET ORAL 3 TIMES DAILY
Qty: 30 TABLET | Refills: 4 | Status: SHIPPED | OUTPATIENT
Start: 2022-08-01 | End: 2022-08-04

## 2022-08-01 NOTE — PROGRESS NOTES
OB/GYN      NAME:  Ottoniel Mccormack  PCP:  No Ref-Primary, Physician  MRN:  [id number]    Impression / Plan     37 year old  with:      ICD-10-CM    1. Abnormal uterine bleeding (AUB)  N93.9 tranexamic acid (LYSTEDA) 650 MG tablet   2. Submucous and subserous leiomyoma of uterus  D25.0     D25.2        Discussed management options for fibroid uterus in light of her desire to maintain fertility.  Discussed medical and surgery management option.  Patient has significant bulk symptoms and may consider hysterectomy.  She may also consider myomectomy, but would need an early  delivery.  Removing the fibroids may also cause scarring of the endometrial and/or fallopian tubes.  She is welcome to a 2nd opinion.  Accessa not available at our hospital and it is too new to understand implications for future pregnancy.  Discussed Italiafekassidy and Facundo. Intramural fibroid is large and I don't recommend Myosure approach.  If that was considered, it would like need multiple attempts to completely remove the intramural fibroid.    Depo lupron may also be considered.        Patient will contact me after she has had time to review her options.  Plan Lysteda for management of heavy bleeding in the mean time.     Chief Complaint     Chief Complaint   Patient presents with     Follow Up       HPI     Ottoniel Mccormack is a  37 year old female who is seen for follow-up.  I saw the patient for her routine health maintenance exam on 2022, at which time she had questions about fertility.  Ovulation predictor kits suggested ovulation.    About 5 years ago, she tried to conceive for about 2 years without success.  Ultrasound was ordered as well as a progesterone level.  Day 21 labs were not done yet.  Ultrasound demonstrated multiple uterine leiomyomas and uterus is 14 x 13 cm    Patient has bulk symptoms from the fibroids, including urinary frequency, pelvic pressure, and trouble passing stool.      Patient's last  menstrual period was 07/31/2022 (exact date). Heavy bleeding for three days per month, requires ultra tampon and a pad.        Problem List     Patient Active Problem List    Diagnosis Date Noted     Cervical cancer screening 07/01/2016     Priority: Medium     2013 NILM  2016 ASCUS, Positive for High Risk HPV type 16  30 y.o.  7/16 colpo: TARA 2  8/15/16 LEEP: TARA 3 with + endocervical margins  7/2017 NILM, hpv negative 32 y.o.  3/2019 NILM Negative HPV 33 y.o.PLAN, per ASCCP Guidelines:cotest 3/2022  6/17/22 NIL pap, Neg HPV. Plan: cotest in 3 years    *After excision or ablation for TARA 2+, cotest in 6 months, then cotest annually x 3, then cotest every 3 years for at least 25 years. As cervical cancer risk remains above general population levels, continued screening for as long as the pt remains in good health is acceptable. (2019 ASCCP guideline).  Any abnormal pap or + HR HPV test within the 25 years warrants a colposcopy.* (2019 ASCCP advisor answer).             Medications     Current Outpatient Medications   Medication     albuterol (PROAIR HFA) 108 (90 Base) MCG/ACT inhaler     escitalopram (LEXAPRO) 20 MG tablet     fluticasone (FLONASE) 50 MCG/ACT nasal spray     montelukast (SINGULAIR) 10 MG tablet     QVAR REDIHALER 80 MCG/ACT inhaler     SUMAtriptan (IMITREX) 50 MG tablet     tranexamic acid (LYSTEDA) 650 MG tablet     traZODone (DESYREL) 50 MG tablet     triamcinolone (KENALOG) 0.1 % external ointment     VITAMIN D PO     EPINEPHrine (ANY BX GENERIC EQUIV) 0.3 MG/0.3ML injection 2-pack     No current facility-administered medications for this visit.        Allergies     Allergies   Allergen Reactions     Fish Anaphylaxis     Shellfish Allergy Hives       ROS     Pertinent positives and negatives are listed in the HPI.     Physical Exam   Vitals: /66 (BP Location: Right arm, Cuff Size: Adult Regular)   Pulse 88   Wt 82.1 kg (181 lb)   LMP 07/31/2022 (Exact Date)   Breastfeeding No   BMI  29.66 kg/m      General: Comfortable, no obvious distress  : Recently demonstrated an enlarged fibroid uterus    Labs/Imaging       I have personally reviewed the labs/imaging and the findings were:    Ultrasound 7/15/2022:    Uterus 14.3 x 7.2 x 13.4 cm wide    Subserosal fibroids 5.3 cm and 6.1 cm    Submucosal fibroid 5.7 cm    Endometrial stripe 5 mm    Other smaller fibroids were not distinctly measured    Normal ovaries bilaterally.    35 min spent on the date of the encounter in chart review, patient visit, review of tests, documentation about the issues documented above.     Lamar Hernandez MD

## 2022-08-30 ENCOUNTER — VIRTUAL VISIT (OUTPATIENT)
Dept: OBGYN | Facility: CLINIC | Age: 37
End: 2022-08-30
Payer: COMMERCIAL

## 2022-08-30 DIAGNOSIS — Z31.41 FERTILITY TESTING: ICD-10-CM

## 2022-08-30 DIAGNOSIS — D25.2 SUBMUCOUS AND SUBSEROUS LEIOMYOMA OF UTERUS: ICD-10-CM

## 2022-08-30 DIAGNOSIS — D25.0 SUBMUCOUS AND SUBSEROUS LEIOMYOMA OF UTERUS: ICD-10-CM

## 2022-08-30 DIAGNOSIS — N93.9 ABNORMAL UTERINE BLEEDING (AUB): Primary | ICD-10-CM

## 2022-08-30 PROCEDURE — 99215 OFFICE O/P EST HI 40 MIN: CPT | Mod: TEL | Performed by: OBSTETRICS & GYNECOLOGY

## 2022-08-30 NOTE — PROGRESS NOTES
"Ottoniel is a 37 year old who is being evaluated via a billable telephone visit.      What phone number would you like to be contacted at? [phone]  How would you like to obtain your AVS? Gilberto Alcazar is a 37 year old  referred here by self for consultation regarding second opinion.  .  She was last seen by her gynecologist Dr. Hernandez on 2022 regarding the above complaints below as listed in the summary note from Dr. Hernandez  Patient's main concern concerns were all the options regarding her uterine fibroids.    \"\"  1. Abnormal uterine bleeding (AUB)  N93.9 tranexamic acid (LYSTEDA) 650 MG tablet   2. Submucous and subserous leiomyoma of uterus  D25.0       D25.2           Discussed management options for fibroid uterus in light of her desire to maintain fertility.  Discussed medical and surgery management option.  Patient has significant bulk symptoms and may consider hysterectomy.  She may also consider myomectomy, but would need an early  delivery.  Removing the fibroids may also cause scarring of the endometrial and/or fallopian tubes.  She is welcome to a 2nd opinion.  Accessa not available at our hospital and it is too new to understand implications for future pregnancy.  Discussed Myfebree and Facundo. Intramural fibroid is large and I don't recommend Myosure approach.  If that was considered, it would like need multiple attempts to completely remove the intramural fibroid.    Depo lupron may also be considered.         Patient will contact me after she has had time to review her options.  Plan Lysteda for management of heavy bleeding in the mean time. \"\"    Narrative & Impression   Transabdominal and endovaginal pelvic ultrasound     INDICATION: Facility testing     COMPARISON: None     FINDINGS: The uterus is heterogeneous with multiple well-defined  slightly hypoechoic masses in the myometrium mostly on the right.  Subserosal fibroids measure 4.4 x 5.3 x 4.7 cm and 6.1 x 5.6 x 5.7 " cm.  A submucosal fibroid measures 5.7 x 5.7 x 4.8 cm. Other smaller  similar fibroids were not distinctly measured.  The right ovary measures 3.5 x 2.5 x 1.7 cm. Left ovary measures 3.2 x  2.6 x 1.5 cm. Small physiologic follicles are noted in both ovaries  which appear simple. These number less than 5 in each ovary. There is  no free fluid. Endometrial thickness was 5 mm.                                                                      IMPRESSION: Multiple uterine leiomyomas. Probable physiologic  nonenlarged bilateral ovarian follicles.       ROS: Ten point review of systems was reviewed and negative except the above.    Gyne:   Overview    2013 NILM  2016 ASCUS, Positive for High Risk HPV type 16  30 y.o.  7/16 colpo: TARA 2  8/15/16 LEEP: TARA 3 with + endocervical margins  7/2017 NILM, hpv negative 32 y.o.  3/2019 NILM Negative HPV 33 y.o.PLAN, per ASCCP Guidelines:cotest 3/2022  6/17/22 NIL pap, Neg HPV. Plan: cotest in 3 years     *After excision or ablation for TARA 2+, cotest in 6 months, then cotest annually x 3, then cotest every 3 years for at least 25 years. As cervical cancer risk remains above general population levels, continued screening for as long as the pt remains in good health is acceptable. (2019 ASCCP guideline).  Any abnormal pap or + HR HPV test within the 25 years warrants a colposcopy.* (2019 ASCCP advisor answer).                 No past medical history on file.  Past Surgical History:   Procedure Laterality Date     LEEP TX, CERVICAL       Patient Active Problem List   Diagnosis     Cervical cancer screening       ALL/Meds: Her medication and allergy histories were reviewed and are documented in their appropriate chart areas.    SH: - tob, - EtOH,     FH: Her family history was reviewed and documented in its appropriate chart area.    PE: LMP 07/31/2022 (Exact Date)   There is no height or weight on file to calculate BMI.      A/P    ICD-10-CM    1. Abnormal uterine bleeding (AUB)   N93.9    2. Submucous and subserous leiomyoma of uterus  D25.0     D25.2    3. Fertility testing  Z31.41      I discussed fibroids.  We discussed their origin, the fact that while they are benign, they do tend to grow slowly in response to estrogen.  We discussed the normal resolution that gradually occurs after menopause.  I explained that fibroids are very common and in many cases do not cause symptoms.  We discussed these symptoms, including menorrhagia, metrorrhagia, dysmenorrhea as well as the mass effect that fibroids can cause. And how they impact infertility.  We discussed options for treatment.  These include conservative observation, symptomatic hormonal control, myomectomy, uterine artery embolization, and hysterectomy.  We discussed the RISKS, BENEFITS, AND ALTERNATIVES of each of these options.  This includes the risk of post-procedure pain, passage of a necrosed fibroid and the need for post embolization hysterectomy.     She appears to be leaning towards preserving her uterus.  She does not appear to be ready for any surgery until probably next year.  She is considering myomectomy so she can pursue pregnancy in the future.  She is also considering Lupron injection to try to decrease the size of the fibroids..    I explained to her that her discussion with Dr. Hernandez with  very in-depth and complete.  I do not disagree with the plan of care by Dr. Hernandez.  She plans on follow-up with Dr. Hernandez to further help decide treatment plan.    Total time preparing to see patient with reviewing prior encounter and labs, telephone time,  and coordinating care on the same calendar date:40 mins.    CEPHAS AGBEH, MD.   Phone call duration: 20 minutes    .  ..

## 2022-09-14 ENCOUNTER — TELEPHONE (OUTPATIENT)
Dept: DERMATOLOGY | Facility: CLINIC | Age: 37
End: 2022-09-14

## 2022-09-14 ENCOUNTER — OFFICE VISIT (OUTPATIENT)
Dept: DERMATOLOGY | Facility: CLINIC | Age: 37
End: 2022-09-14
Payer: COMMERCIAL

## 2022-09-14 DIAGNOSIS — L20.9 ATOPIC DERMATITIS, UNSPECIFIED TYPE: Primary | ICD-10-CM

## 2022-09-14 PROCEDURE — 99203 OFFICE O/P NEW LOW 30 MIN: CPT | Performed by: PHYSICIAN ASSISTANT

## 2022-09-14 RX ORDER — BETAMETHASONE DIPROPIONATE 0.5 MG/G
CREAM TOPICAL
Qty: 50 G | Refills: 6 | Status: SHIPPED | OUTPATIENT
Start: 2022-09-14 | End: 2023-10-25

## 2022-09-14 RX ORDER — TACROLIMUS 1 MG/G
OINTMENT TOPICAL
Qty: 30 G | Refills: 4 | Status: SHIPPED | OUTPATIENT
Start: 2022-09-14 | End: 2023-10-25

## 2022-09-14 ASSESSMENT — PAIN SCALES - GENERAL: PAINLEVEL: NO PAIN (0)

## 2022-09-14 NOTE — TELEPHONE ENCOUNTER
Prior Authorization Approval    Authorization Effective Date: 8/15/2022  Authorization Expiration Date: 1/12/2023  Medication: Dupixent PA Approved  Approved Dose/Quantity: 6ml per 28 days  Reference #: [id number]   Insurance Company: [employer] - Phone [phone] Fax [phone]  Expected CoPay:       CoPay Card Available:      Foundation Assistance Needed:    Which Pharmacy is filling the prescription (Not needed for infusion/clinic administered): [address]  Pharmacy Notified:    Patient Notified:    Yes        [facility]rmila Mosley [facility] Liaison  [email or web address]  Phone: [phone]  Fax: [phone]

## 2022-09-14 NOTE — PROGRESS NOTES
Ottoniel Mccormack is an extremely pleasant 37 year old year old female patient here today for atopic dermatitis. She notes this was prevalent when she was a child then started to return about 1.5 years ago. She notes it is present currently on abdomen, hands, feet, and antecubital fossa. She reports triamcinolone initially helped but now not helpful. She has tried numerous moisturizers. .  Patient has no other skin complaints today.  Remainder of the HPI, Meds, PMH, Allergies, FH, and SH was reviewed in chart.    Pertinent Hx:  Atopic dermatitis   No past medical history on file.    Past Surgical History:   Procedure Laterality Date     LEEP TX, CERVICAL          Family History   Problem Relation Age of Onset     Hypertension Mother      Other - See Comments Father 67        Julia - thinks pancreatic cancer      No Known Problems Brother      Diabetes Maternal Grandmother         adult      No Known Problems Brother      No Known Problems Brother      No Known Problems Brother      No Known Problems Brother      Breast Cancer No family hx of      Colon Cancer No family hx of        Social History     Socioeconomic History     Marital status: Single     Spouse name: Not on file     Number of children: Not on file     Years of education: Not on file     Highest education level: Not on file   Occupational History     Not on file   Tobacco Use     Smoking status: Never Smoker     Smokeless tobacco: Never Used   Vaping Use     Vaping Use: Never used   Substance and Sexual Activity     Alcohol use: Yes     Comment: once a week     Drug use: Never     Sexual activity: Yes     Partners: Male     Birth control/protection: None   Other Topics Concern     Not on file   Social History Narrative     Not on file     Social Determinants of Health     Financial Resource Strain: Not on file   Food Insecurity: Not on file   Transportation Needs: Not on file   Physical Activity: Not on file   Stress: Not on file   Social Connections:  Not on file   Intimate Partner Violence: Not on file   Housing Stability: Not on file       Outpatient Encounter Medications as of 9/14/2022   Medication Sig Dispense Refill     augmented betamethasone dipropionate (DIPROLENE AF) 0.05 % external cream Apply twice daily as needed. 50 g 6     dupilumab (DUPIXENT) 300 MG/2ML prefilled pen Inject 4 mLs (600 mg) Subcutaneous See Admin Instructions for 1 dose 4 mL 0     dupilumab (DUPIXENT) 300 MG/2ML prefilled pen Inject 2 mLs (300 mg) Subcutaneous every 14 days 4 mL 11     tacrolimus (PROTOPIC) 0.1 % external ointment Apply twice daily as needed. 30 g 4     triamcinolone (KENALOG) 0.1 % external ointment Apply topically 2 times daily For 14 days - not use for 14 days then repeat as needed 80 g 1     albuterol (PROAIR HFA) 108 (90 Base) MCG/ACT inhaler INHALE 1 TO 2 PUFFS BY MOUTH EVERY 6 HOURS AS NEEDED FOR WHEEZING 18 g 4     EPINEPHrine (ANY BX GENERIC EQUIV) 0.3 MG/0.3ML injection 2-pack Inject into the muscle as directed for anaphylaxis (Patient not taking: No sig reported) 2 each 1     escitalopram (LEXAPRO) 20 MG tablet Take 1 tablet (20 mg) by mouth daily 90 tablet 1     fluticasone (FLONASE) 50 MCG/ACT nasal spray Spray 1 spray into both nostrils daily 16 g 3     montelukast (SINGULAIR) 10 MG tablet TAKE 1 TABLET BY MOUTH EVERYDAY AT BEDTIME 90 tablet 1     QVAR REDIHALER 80 MCG/ACT inhaler Inhale 1 puff into the lungs 2 times daily 10.6 g 5     SUMAtriptan (IMITREX) 50 MG tablet Take 1 tablet (50 mg) by mouth at onset of headache for migraine May repeat in 2 hours. Max 4 tablets/24 hours. 9 tablet 0     traZODone (DESYREL) 50 MG tablet 1-2 at bedtime as needed for sleep. 180 tablet 1     VITAMIN D PO        No facility-administered encounter medications on file as of 9/14/2022.             O:   NAD, WDWN, Alert & Oriented, Mood & Affect wnl, Vitals stable   Here today alone   LMP 07/31/2022 (Exact Date)    General appearance normal   Vitals stable   Alert,  oriented and in no acute distress     Eczematous plaques dorsal feet, dorsal hands, antecubital fossa, and abdomen     Eyes: Conjunctivae/lids:Normal     ENT: Lips: normal    MSK:Normal    Pulm: Breathing Normal    Neuro/Psych: Orientation:Alert and Orientedx3 ; Mood/Affect:normal  A/P:  1. Atopic Dermatitis   Apply betamethasone cream twice daily Monday through Thursday and apply protopic ointment Friday through Sunday.  Discussed dupixent, she is interested in trying, her friend has had great success.  Prescription was sent.   SCORAD: 67.1  Skin care regimen reviewed with patient: Eliminate harsh soaps, i.e. Dial, zest, irsih spring; Mild soaps such as Cetaphil or Dove sensitive skin, avoid hot or cold showers, aggressive use of emollients including vanicream, cetaphil or cerave discussed with patient.    Return to clinic in 3-4 months.

## 2022-09-14 NOTE — LETTER
9/14/2022         RE: Ottoniel Mccormack  [address] [facility] [place] [address]        Dear Colleague,    Thank you for referring your patient, Ottoniel Mccormack, to the [facility]. Please see a copy of my visit note below.    Ottoniel Mccormack is an extremely pleasant 37 year old year old female patient here today for atopic dermatitis. She notes this was prevalent when she was a child then started to return about 1.5 years ago. She notes it is present currently on abdomen, hands, feet, and antecubital fossa. She reports triamcinolone initially helped but now not helpful. She has tried numerous moisturizers. .  Patient has no other skin complaints today.  Remainder of the HPI, Meds, PMH, Allergies, FH, and SH was reviewed in chart.    Pertinent Hx:  Atopic dermatitis   No past medical history on file.    Past Surgical History:   Procedure Laterality Date     LEEP TX, CERVICAL          Family History   Problem Relation Age of Onset     Hypertension Mother      Other - See Comments Father 67        Julia - thinks pancreatic cancer      No Known Problems Brother      Diabetes Maternal Grandmother         adult      No Known Problems Brother      No Known Problems Brother      No Known Problems Brother      No Known Problems Brother      Breast Cancer No family hx of      Colon Cancer No family hx of        Social History     Socioeconomic History     Marital status: Single     Spouse name: Not on file     Number of children: Not on file     Years of education: Not on file     Highest education level: Not on file   Occupational History     Not on file   Tobacco Use     Smoking status: Never Smoker     Smokeless tobacco: Never Used   Vaping Use     Vaping Use: Never used   Substance and Sexual Activity     Alcohol use: Yes     Comment: once a week     Drug use: Never     Sexual activity: Yes     Partners: Male     Birth control/protection: None   Other Topics Concern     Not on file   Social  History Narrative     Not on file     Social Determinants of Health     Financial Resource Strain: Not on file   Food Insecurity: Not on file   Transportation Needs: Not on file   Physical Activity: Not on file   Stress: Not on file   Social Connections: Not on file   Intimate Partner Violence: Not on file   Housing Stability: Not on file       Outpatient Encounter Medications as of 9/14/2022   Medication Sig Dispense Refill     augmented betamethasone dipropionate (DIPROLENE AF) 0.05 % external cream Apply twice daily as needed. 50 g 6     dupilumab (DUPIXENT) 300 MG/2ML prefilled pen Inject 4 mLs (600 mg) Subcutaneous See Admin Instructions for 1 dose 4 mL 0     dupilumab (DUPIXENT) 300 MG/2ML prefilled pen Inject 2 mLs (300 mg) Subcutaneous every 14 days 4 mL 11     tacrolimus (PROTOPIC) 0.1 % external ointment Apply twice daily as needed. 30 g 4     triamcinolone (KENALOG) 0.1 % external ointment Apply topically 2 times daily For 14 days - not use for 14 days then repeat as needed 80 g 1     albuterol (PROAIR HFA) 108 (90 Base) MCG/ACT inhaler INHALE 1 TO 2 PUFFS BY MOUTH EVERY 6 HOURS AS NEEDED FOR WHEEZING 18 g 4     EPINEPHrine (ANY BX GENERIC EQUIV) 0.3 MG/0.3ML injection 2-pack Inject into the muscle as directed for anaphylaxis (Patient not taking: No sig reported) 2 each 1     escitalopram (LEXAPRO) 20 MG tablet Take 1 tablet (20 mg) by mouth daily 90 tablet 1     fluticasone (FLONASE) 50 MCG/ACT nasal spray Spray 1 spray into both nostrils daily 16 g 3     montelukast (SINGULAIR) 10 MG tablet TAKE 1 TABLET BY MOUTH EVERYDAY AT BEDTIME 90 tablet 1     QVAR REDIHALER 80 MCG/ACT inhaler Inhale 1 puff into the lungs 2 times daily 10.6 g 5     SUMAtriptan (IMITREX) 50 MG tablet Take 1 tablet (50 mg) by mouth at onset of headache for migraine May repeat in 2 hours. Max 4 tablets/24 hours. 9 tablet 0     traZODone (DESYREL) 50 MG tablet 1-2 at bedtime as needed for sleep. 180 tablet 1     VITAMIN D PO        No  facility-administered encounter medications on file as of 9/14/2022.             O:   NAD, WDWN, Alert & Oriented, Mood & Affect wnl, Vitals stable   Here today alone   LMP 07/31/2022 (Exact Date)    General appearance normal   Vitals stable   Alert, oriented and in no acute distress     Eczematous plaques dorsal feet, dorsal hands, antecubital fossa, and abdomen     Eyes: Conjunctivae/lids:Normal     ENT: Lips: normal    MSK:Normal    Pulm: Breathing Normal    Neuro/Psych: Orientation:Alert and Orientedx3 ; Mood/Affect:normal  A/P:  1. Atopic Dermatitis   Apply betamethasone cream twice daily Monday through Thursday and apply protopic ointment Friday through Sunday.  Discussed dupixent, she is interested in trying, her friend has had great success.  Prescription was sent.   SCORAD: 67.1  Skin care regimen reviewed with patient: Eliminate harsh soaps, i.e. Dial, zest, irsih spring; Mild soaps such as Cetaphil or Dove sensitive skin, avoid hot or cold showers, aggressive use of emollients including vanicream, cetaphil or cerave discussed with patient.    Return to clinic in 3-4 months.         Again, thank you for allowing me to participate in the care of your patient.        Sincerely,        Vanessa Hernandez PA-C

## 2022-10-07 ENCOUNTER — TELEPHONE (OUTPATIENT)
Dept: OBGYN | Facility: CLINIC | Age: 37
End: 2022-10-07

## 2022-10-07 ENCOUNTER — VIRTUAL VISIT (OUTPATIENT)
Dept: OBGYN | Facility: CLINIC | Age: 37
End: 2022-10-07
Payer: COMMERCIAL

## 2022-10-07 DIAGNOSIS — D25.2 SUBMUCOUS AND SUBSEROUS LEIOMYOMA OF UTERUS: Primary | ICD-10-CM

## 2022-10-07 DIAGNOSIS — D25.0 SUBMUCOUS AND SUBSEROUS LEIOMYOMA OF UTERUS: Primary | ICD-10-CM

## 2022-10-07 PROCEDURE — 99214 OFFICE O/P EST MOD 30 MIN: CPT | Mod: TEL | Performed by: OBSTETRICS & GYNECOLOGY

## 2022-10-07 NOTE — PROGRESS NOTES
OB/GYN        NAME:  Ottoniel Mccormack  PCP:  No Ref-Primary, Physician  MRN:  [id number]      Impression / Plan     37 year old  with:      ICD-10-CM    1. Submucous and subserous leiomyoma of uterus  D25.0 MR Pelvis (GYN) wo & w Contrast    D25.2           Discussed management options. Patient would like to go ahead with an open myomectomy.   Plan abdominal myomectomy.  Details of the procedure were discussed with the patient.  Risks include, but are not limited to, bleeding, infection, and injury to surrounding organs such as the bowel, urinary system, nerves, and blood vessels.   Discussed risk of scarring of the lining of the uterus and scarring of the fallopian tubes, which may lead to fertility problems.    Discussed risk of future pregnancy, including but not limited to, uterine rupture, early  and the need for the baby to be in the NICU.    Injury may result in repair at the time of the surgery or in a separate procedure.  All questions answered, and accepting these risks, the patient elects to proceed with the procedure.  Plan MRI for surgery planning. I will contact her with the results.   Plan to continue the lysteda.     She will be scheduled for preoperative clearance with her PCP.      Chief Complaint     Chief Complaint   Patient presents with     Consult     Depo lupron        HPI     Ottoniel Mccormack is a  37 year old female who is seen for follow up via telephone visit.     I last saw the patient on 2022 for abnormal bleeding due to fibroid uterus.  At that time, she desired to maintain her fertility.  We discussed management options including hysterectomy.  She has significant bulk symptoms and a very large uterus, so that is a reasonable option.  We also discussed Myfembree and Orrrhian.  She had an appointment later that month with Dr. Agbeh, who reiterated our conversation.   Patient would like to consider Depo Lupron.      The lysteda is helping with the bleeding -  significantly.  She is interested in the myomectomy.      Patient's last menstrual period was 09/05/2022 (approximate).     Problem List     Patient Active Problem List    Diagnosis Date Noted     Cervical cancer screening 07/01/2016     Priority: Medium     2013 NILM  2016 ASCUS, Positive for High Risk HPV type 16  30 y.o.  7/16 colpo: TARA 2  8/15/16 LEEP: TARA 3 with + endocervical margins  7/2017 NILM, hpv negative 32 y.o.  3/2019 NILM Negative HPV 33 y.o.PLAN, per ASCCP Guidelines:cotest 3/2022  6/17/22 NIL pap, Neg HPV. Plan: cotest in 3 years    *After excision or ablation for TARA 2+, cotest in 6 months, then cotest annually x 3, then cotest every 3 years for at least 25 years. As cervical cancer risk remains above general population levels, continued screening for as long as the pt remains in good health is acceptable. (2019 ASCCP guideline).  Any abnormal pap or + HR HPV test within the 25 years warrants a colposcopy.* (2019 ASCCP advisor answer).             Medications     Current Outpatient Medications   Medication     albuterol (PROAIR HFA) 108 (90 Base) MCG/ACT inhaler     augmented betamethasone dipropionate (DIPROLENE AF) 0.05 % external cream     dupilumab (DUPIXENT) 300 MG/2ML prefilled pen     EPINEPHrine (ANY BX GENERIC EQUIV) 0.3 MG/0.3ML injection 2-pack     escitalopram (LEXAPRO) 20 MG tablet     fluticasone (FLONASE) 50 MCG/ACT nasal spray     montelukast (SINGULAIR) 10 MG tablet     QVAR REDIHALER 80 MCG/ACT inhaler     SUMAtriptan (IMITREX) 50 MG tablet     tacrolimus (PROTOPIC) 0.1 % external ointment     traZODone (DESYREL) 50 MG tablet     triamcinolone (KENALOG) 0.1 % external ointment     VITAMIN D PO     dupilumab (DUPIXENT) 300 MG/2ML prefilled pen     No current facility-administered medications for this visit.        Allergies     Allergies   Allergen Reactions     Fish Anaphylaxis     Shellfish Allergy Hives       Past Medical/Surgical History     No past medical history on  file.    Past Surgical History:   Procedure Laterality Date     LEEP TX, CERVICAL          Social History     Social History     Socioeconomic History     Marital status: Single     Spouse name: Not on file     Number of children: Not on file     Years of education: Not on file     Highest education level: Not on file   Occupational History     Not on file   Tobacco Use     Smoking status: Never Smoker     Smokeless tobacco: Never Used   Vaping Use     Vaping Use: Never used   Substance and Sexual Activity     Alcohol use: Yes     Comment: once a week     Drug use: Never     Sexual activity: Yes     Partners: Male     Birth control/protection: None   Other Topics Concern     Not on file   Social History Narrative     Not on file     Social Determinants of Health     Financial Resource Strain: Not on file   Food Insecurity: Not on file   Transportation Needs: Not on file   Physical Activity: Not on file   Stress: Not on file   Social Connections: Not on file   Intimate Partner Violence: Not on file   Housing Stability: Not on file       Family History      Family History   Problem Relation Age of Onset     Hypertension Mother      Other - See Comments Father 67        Julia - thinks pancreatic cancer      No Known Problems Brother      Diabetes Maternal Grandmother         adult      No Known Problems Brother      No Known Problems Brother      No Known Problems Brother      No Known Problems Brother      Breast Cancer No family hx of      Colon Cancer No family hx of        ROS     Pertinent positives and negatives are listed in the HPI.     Physical Exam   Vitals: LMP 09/05/2022 (Approximate)   Breastfeeding No     Telephone visit.    Labs/Imaging       I have personally reviewed the labs/imaging and findings were:    uterus 14.3 x 7.2 x 13.4 cm wide.          Lamar Hernandez MD     Reviewed ultrasound, ordered MRI, medication management, elective major procedure    Telephone time = 15 min

## 2022-10-07 NOTE — PROGRESS NOTES
Ottoniel is a 37 year old who is being evaluated via a billable telephone visit.      What phone number would you like to be contacted at? [phone]  How would you like to obtain your AVS? [employer]

## 2022-10-07 NOTE — TELEPHONE ENCOUNTER
Health Call Center    Phone Message    May a detailed message be left on voicemail: yes     Reason for Call: Pt missed her telephone appointment with Dr. Hernandez this morning.  Pt is wondering if there is anyway she can get her procedure scheduled and not have to wait until her November appointment to talk to Dr. Hernandez.  She thought maybe Dr. Hernandez could just call her and not have it be a scheduled appointment.  Please call Pt back to discuss.  Thanks.    Action Taken: Message routed to:  [facility] p [phone]    Travel Screening: Not Applicable

## 2022-10-10 ENCOUNTER — TELEPHONE (OUTPATIENT)
Dept: OBGYN | Facility: OTHER | Age: 37
End: 2022-10-10

## 2022-10-10 DIAGNOSIS — Z01.812 PRE-OPERATIVE LABORATORY EXAMINATION: Primary | ICD-10-CM

## 2022-10-10 NOTE — PATIENT INSTRUCTIONS
You will be scheduled for an abdominal myomectomy.  This means that you will have an abdominal incision on your lower belly, either side to side or up and down depending on how big the uterus is.  We will access the uterus and remove the fibroids.  After we remove the fibroids, we will sew the uterus back together.  You will be in the hospital for 2 to 4 days after the surgery.  As we discussed, there is a risk that the uterus could be scarred on the inside after the fibroids are removed, which could block the fallopian tubes or make it more difficult to get pregnant.  When you do become pregnant, this will be a high risk pregnancy.  There is a risk of uterine rupture and the baby would have to be delivered by  at least a month early.  The baby may need to be admitted to the special care nursery (NICU).    Our surgery scheduler will contact you in the next couple of days to help you schedule this surgery, as well as the preoperative and postoperative visits.      The surgery generally takes about 2 hours, sometimes longer.      You will be given instructions regarding pain management at the time of discharge after the procedure.  Generally we recommend ibuprofen and Tylenol in a scheduled fashion.  I will also send in a prescription for oxycodone to take as needed. In general, we expect that you should not be using the narcotic medication after the first week after surgery is over.  Oxycodone often contributes to constipation.  I recommend you stay hydrated and use stool softeners as directed.  It is common to develop occasional crampy pain and bloating after surgery.  You may use simethicone over-the-counter per package instructions as needed if desired for gas pain.  You may also have some shoulder pain, which can last a few days.    You will also be given instructions regarding restrictions at the time of discharge after the procedure.  Generally we limit lifting to 10-15 lbs for the first 6-8 weeks  after the surgery.  This includes pushing and pulling (no pushing grocery carts or vacuuming).  I also advise that you abstain from intercourse for at least 6-8 weeks after surgery (nothing in the vagina).  We will determine when you can resume sexual activity after your postoperative exam.  You will be able to shower anytime.  I advise no bathing or swimming for at least a week after surgery.  Plan to avoid vigorous activity until I see you at your postoperative visit. Walking is advised.     You will be given instructions regarding incisional care at the time of discharge.    Additional instructions will be provided upon discharge.

## 2022-10-10 NOTE — TELEPHONE ENCOUNTER
[facility] SURGERY PLANNING/SCHEDULING WORKSHEET                                                     Ottoniel Mccormack                :  1985  MRN:  [id number]  Home Phone [phone]   Work Phone Not on file.   Mobile [phone]         Surgeon: Lamar Hernandez MD    DIAGNOSIS:   Fibroid uterus    SURGICAL PROCEDURE:  Abdominal myomectomy     Surgery Location:  [facility]  Patient Surgery Class:  Admission with overnight stay of 3 days  Length of Procedure:  120 minutes  Type of anesthesia:  General    Multi-surgeon case: Yes - any of the partners to assist  OR Assistant needed:   No  Vendor needed: No  Positioning:  Supine  Laterality:  NA  Date requested:  anytime    Special Equipment: None  Special Instructions for patient:  Per the [facility] Pre-Admission Nurse when they call the patient prior to the surgery date.   Precautions:  NONE  :  NOT NEEDED    Sterilization consent:  Not applicable to procedure being performed.    Preop: Pre-op options: PCP  Pre-surgery consult needed:  Not applicable.  Postop evaluation needed:  6 weeks    ALLERGIES:   Allergies   Allergen Reactions     Fish Anaphylaxis     Shellfish Allergy Hives      BMI:There is no height or weight on file to calculate BMI.      The proposed surgical procedure is considered INTERMEDIATE risk.      Lamar Hernandez MD    10/10/2022

## 2022-10-10 NOTE — TELEPHONE ENCOUNTER
[facility] SURGERY PLANNING/SCHEDULING WORKSHEET                                                     Ottoniel Mccormack                :  1985  MRN:  [id number]  Home Phone [phone]   Work Phone Not on file.   Mobile [phone]         Surgeon: Lamar Hernandez MD     DIAGNOSIS:   Fibroid uterus     SURGICAL PROCEDURE:  Abdominal myomectomy      Surgery Location:  [facility]  Patient Surgery Class:  Admission with overnight stay of 3 days  Length of Procedure:  120 minutes  Type of anesthesia:  General     Multi-surgeon case: Yes - any of the partners to assist  OR Assistant needed:   No  Vendor needed: No  Positioning:  Supine  Laterality:  NA  Date requested:  anytime     Special Equipment: None  Special Instructions for patient:  Per the [facility] Pre-Admission Nurse when they call the patient prior to the surgery date.   Precautions:  NONE  :  NOT NEEDED     Sterilization consent:  Not applicable to procedure being performed.     Preop: Pre-op options: PCP  Pre-surgery consult needed:  Not applicable.  Postop evaluation needed:  6 weeks     ALLERGIES:        Allergies   Allergen Reactions     Fish Anaphylaxis     Shellfish Allergy Hives      BMI:There is no height or weight on file to calculate BMI.       The proposed surgical procedure is considered INTERMEDIATE risk.        Lamar Hernandez MD    10/10/2022  SURGERY SCHEDULING AND PRECERTIFICATION    Medical Record Number: [id number]  Ottoniel Mccormack  YOB: 1985   Phone: [phone] (home)   Primary Provider: No Ref-Primary, Physician    Reason for Admit:  ICD-10 CODE:  D25.0 & D25.2    Surgeon: Lamar Hernandez MD & Dr Velasco assisting  Surgical Procedure: Abdominal myomectomy     Date of Surgery  Time of Surgery 7:30am  Surgery to be performed at:  [facility]  Status: Inpatient- Length of stay:  3 days.  Type of Anesthesia Anticipated: General    Sterilization consent:  Not applicable to  procedure being performed.    Pre-Op: On 12/29 with [place] Main at [facility]  COVID testing:  The Covid test has been scheduled for 12/29 at [place]  at Lab.  Post-Op:  6 weeks patient to schedule with Dr Hernandez    Pre-certification routed to Financial Counselors:  Yes    Surgery packet mailed to patient's home address: Yes  Patient instructed NPO 12 hours prior to surgery, arrive 1.5 hours  prior to surgery, must have a .  Patient understood and agrees to the plan.      Requestor:  Jennifer Garcia     Location:  [place] [phone]

## 2022-10-12 NOTE — TELEPHONE ENCOUNTER
Unfortunately this will be a more difficult case and I prefer to have the doc that is helping me NOT be on call.  I understand this may push the surgery out a little, but I will need extra hands.

## 2022-10-18 NOTE — TELEPHONE ENCOUNTER
Patient returned the call, she would like to wait to schedule her surgery for January.  Will postpone this message a few weeks until the on call schedule is out for January.

## 2022-10-24 NOTE — TELEPHONE ENCOUNTER
"PB DOS: 1/3/23  Type of Procedure: Abdominal myomectomy   CPT Codes: 65463  ICD10 Codes: D25.0 & D25.2  Surgeon/Ordering provider: Lamar Hernandez  Pre-cert/Authorization completed:  no PA required   Payer: [facility]   Spoke to [facility] portal   Decision ID #:[id number]    Form and office visit faxed      As long as insurance and plan remain the same notification or Prior Authorization is not required for the requested services    [facility] member's plan does not currently require a prior authorization for these services    Prior authorization(PA) or \"no PA required\" is not a guarantee for coverage.  Please advise the patient to contact insurance for their specific plan benefits.     Thank you,    DANIEL Pisano  Financial Counselor  [facility]  [address] [place] [address]  [phone]    "

## 2022-10-31 ENCOUNTER — TELEPHONE (OUTPATIENT)
Dept: FAMILY MEDICINE | Facility: CLINIC | Age: 37
End: 2022-10-31

## 2022-10-31 NOTE — TELEPHONE ENCOUNTER
Called home care and spoke to PRERNA Almanzar and relayed approval for home care orders. Relayed message for PRERNA Almanzar to have PRERNA Montanez to call clinic back with information on stroke.    If Lisseth calls back, please get stroke date per providers request.     Routing to provider as FYI. Care everywhere update and writer noted admission for stroke on 10/24-10/27.     PRERNA Ann  [facility]

## 2022-10-31 NOTE — TELEPHONE ENCOUNTER
I do not have any same day appointments available.  Check with BK or any other provider for same day slot

## 2022-10-31 NOTE — TELEPHONE ENCOUNTER
PT was in the ER on the 27th for a stroke and needs to be seen asap. if PCP can not see her within 5 days after the 27th. ASAP, please follow up to make apt. can leave LM with details.

## 2022-10-31 NOTE — TELEPHONE ENCOUNTER
Lisseth, staff from home care calling to get verbal orders from provider. Home care for PT/OT due to stroke is to start on 11/2/2022. Lisseth also request if provider can follow home care orders.     Lisseth request if orders can be submit urgently. Call back number is [phone] and confidential, okay to leave detailed message.      Routing to provider to review and advise.     PRERNA Ann  [facility]

## 2022-11-01 ENCOUNTER — MEDICAL CORRESPONDENCE (OUTPATIENT)
Dept: FAMILY MEDICINE | Facility: CLINIC | Age: 37
End: 2022-11-01

## 2022-11-01 ENCOUNTER — OFFICE VISIT (OUTPATIENT)
Dept: FAMILY MEDICINE | Facility: CLINIC | Age: 37
End: 2022-11-01
Payer: COMMERCIAL

## 2022-11-01 VITALS
OXYGEN SATURATION: 100 % | BODY MASS INDEX: 27.64 KG/M2 | HEART RATE: 91 BPM | RESPIRATION RATE: 16 BRPM | SYSTOLIC BLOOD PRESSURE: 110 MMHG | WEIGHT: 172 LBS | DIASTOLIC BLOOD PRESSURE: 75 MMHG | HEIGHT: 66 IN

## 2022-11-01 DIAGNOSIS — D64.9 LOW HEMOGLOBIN: Primary | ICD-10-CM

## 2022-11-01 DIAGNOSIS — Z86.73 H/O ISCHEMIC RIGHT MCA STROKE: ICD-10-CM

## 2022-11-01 DIAGNOSIS — F33.40 RECURRENT MAJOR DEPRESSIVE DISORDER, IN REMISSION (H): ICD-10-CM

## 2022-11-01 PROBLEM — F33.9 MAJOR DEPRESSION, RECURRENT (H): Status: ACTIVE | Noted: 2022-11-01

## 2022-11-01 LAB
BASOPHILS # BLD AUTO: 0 10E3/UL (ref 0–0.2)
BASOPHILS NFR BLD AUTO: 1 %
EOSINOPHIL # BLD AUTO: 0.1 10E3/UL (ref 0–0.7)
EOSINOPHIL NFR BLD AUTO: 2 %
ERYTHROCYTE [DISTWIDTH] IN BLOOD BY AUTOMATED COUNT: 24.8 % (ref 10–15)
HCT VFR BLD AUTO: 33.6 % (ref 35–47)
HGB BLD-MCNC: 9.5 G/DL (ref 11.7–15.7)
IMM GRANULOCYTES # BLD: 0 10E3/UL
IMM GRANULOCYTES NFR BLD: 0 %
LYMPHOCYTES # BLD AUTO: 1.1 10E3/UL (ref 0.8–5.3)
LYMPHOCYTES NFR BLD AUTO: 18 %
MCH RBC QN AUTO: 19.3 PG (ref 26.5–33)
MCHC RBC AUTO-ENTMCNC: 28.3 G/DL (ref 31.5–36.5)
MCV RBC AUTO: 68 FL (ref 78–100)
MONOCYTES # BLD AUTO: 0.6 10E3/UL (ref 0–1.3)
MONOCYTES NFR BLD AUTO: 10 %
NEUTROPHILS # BLD AUTO: 4.1 10E3/UL (ref 1.6–8.3)
NEUTROPHILS NFR BLD AUTO: 69 %
NRBC # BLD AUTO: 0 10E3/UL
NRBC BLD AUTO-RTO: 0 /100
PLATELET # BLD AUTO: 201 10E3/UL (ref 150–450)
RBC # BLD AUTO: 4.92 10E6/UL (ref 3.8–5.2)
WBC # BLD AUTO: 5.9 10E3/UL (ref 4–11)

## 2022-11-01 PROCEDURE — 85025 COMPLETE CBC W/AUTO DIFF WBC: CPT | Performed by: FAMILY MEDICINE

## 2022-11-01 PROCEDURE — 99214 OFFICE O/P EST MOD 30 MIN: CPT | Performed by: FAMILY MEDICINE

## 2022-11-01 PROCEDURE — 36415 COLL VENOUS BLD VENIPUNCTURE: CPT | Performed by: FAMILY MEDICINE

## 2022-11-01 RX ORDER — DOCUSATE SODIUM 100 MG/1
100 CAPSULE, LIQUID FILLED ORAL 2 TIMES DAILY
Qty: 30 CAPSULE | Refills: 1 | Status: SHIPPED | OUTPATIENT
Start: 2022-11-01 | End: 2023-06-29

## 2022-11-01 RX ORDER — FERROUS SULFATE 325(65) MG
325 TABLET ORAL
Qty: 30 TABLET | Refills: 1 | Status: SHIPPED | OUTPATIENT
Start: 2022-11-01 | End: 2022-12-20

## 2022-11-01 RX ORDER — ASPIRIN 81 MG/1
81 TABLET, CHEWABLE ORAL
COMMUNITY
Start: 2022-10-28

## 2022-11-01 ASSESSMENT — ASTHMA QUESTIONNAIRES
QUESTION_2 LAST FOUR WEEKS HOW OFTEN HAVE YOU HAD SHORTNESS OF BREATH: ONCE OR TWICE A WEEK
QUESTION_1 LAST FOUR WEEKS HOW MUCH OF THE TIME DID YOUR ASTHMA KEEP YOU FROM GETTING AS MUCH DONE AT WORK, SCHOOL OR AT HOME: NONE OF THE TIME
QUESTION_4 LAST FOUR WEEKS HOW OFTEN HAVE YOU USED YOUR RESCUE INHALER OR NEBULIZER MEDICATION (SUCH AS ALBUTEROL): ONCE A WEEK OR LESS
QUESTION_5 LAST FOUR WEEKS HOW WOULD YOU RATE YOUR ASTHMA CONTROL: WELL CONTROLLED
QUESTION_3 LAST FOUR WEEKS HOW OFTEN DID YOUR ASTHMA SYMPTOMS (WHEEZING, COUGHING, SHORTNESS OF BREATH, CHEST TIGHTNESS OR PAIN) WAKE YOU UP AT NIGHT OR EARLIER THAN USUAL IN THE MORNING: ONCE OR TWICE
ACT_TOTALSCORE: 21
ACT_TOTALSCORE: 21

## 2022-11-01 ASSESSMENT — PATIENT HEALTH QUESTIONNAIRE - PHQ9
SUM OF ALL RESPONSES TO PHQ QUESTIONS 1-9: 3
10. IF YOU CHECKED OFF ANY PROBLEMS, HOW DIFFICULT HAVE THESE PROBLEMS MADE IT FOR YOU TO DO YOUR WORK, TAKE CARE OF THINGS AT HOME, OR GET ALONG WITH OTHER PEOPLE: SOMEWHAT DIFFICULT
SUM OF ALL RESPONSES TO PHQ QUESTIONS 1-9: 3

## 2022-11-01 ASSESSMENT — PAIN SCALES - GENERAL: PAINLEVEL: NO PAIN (0)

## 2022-11-01 NOTE — TELEPHONE ENCOUNTER
Called pt to schedule appt. LVM w/ instructions to call back.  [facility]endez [facility]

## 2022-11-01 NOTE — PATIENT INSTRUCTIONS
At [facility], we strive to deliver an exceptional experience to you, every time we see you. If you receive a survey, please complete it as we do value your feedback.  If you have MyChart, you can expect to receive results automatically within 24 hours of their completion.  Your provider will send a note interpreting your results as well.   If you do not have MyChart, you should receive your results in about a week by mail.    Your care team:                            Family Medicine Internal Medicine   MD Lennox Lenz MD Shantel Branch-Fleming, MD Srinivasa Vaka, MD Katya Belousova, PAYOUSUF Hudson CNP, MD ([facility]   Burt Martinez, MD Louann Jara MD Amelia Massimini APRN VITALIY Butterfield APRN MD Valentín Ospina MD          Clinic hours: Monday - Thursday 7 am-6 pm; Fridays 7 am-5 pm.   Urgent care: Monday - Friday 10 am- 8 pm; Saturday and Sunday 9 am-5 pm.    Clinic: [phone]       [facility]: Monday - Thursday 8 am - 7 pm; Friday 8 am - 6 pm  [facility]: [phone]

## 2022-11-01 NOTE — PROGRESS NOTES
Assessment & Plan     Low hemoglobin  -History of fibroid uterus.  Was on  Tranexamic acid which was discontinued due to recent ischemic stroke episode.  Dustin has myomectomy scheduled in Jan 2023.  -Order CBC.  Prescribed iron supplements with stool softener.  Since she has allergy to fish products (anaphylaxis), due to interactions alert- suggested to take half pill of iron supplement initially to see how she is doing.  If no issues tolerating, she can take iron supplements every day for the next 2 months.      - CBC with platelets and differential; Future  - ferrous sulfate (FEROSUL) 325 (65 Fe) MG tablet; Take 1 tablet (325 mg) by mouth daily (with breakfast)  - docusate sodium (COLACE) 100 MG capsule; Take 1 capsule (100 mg) by mouth 2 times daily  - CBC with platelets and differential    H/O ischemic right MCA stroke  -MRI (10/24/2022) multiple acute infarcts within right MCA distribution.  Cortical involvement at multiple locations.  Symptoms improving.  -She has follow-up with neurology on December 19.  -Still getting home PT/OT set up.  -She has follow-up appointments with cardiology (for tiny patent foramen ovale), rheumatology (for elevated lupus, low protein C, CRP, dsDNA antibody) scheduled from hospital.  -Continues to take aspirin 81 mg.    Recurrent major depressive disorder, in remission (H)  -Symptoms stable on Lexapro.      Review of prior external note(s) from - CareEverywhere information from [facility] reviewed  10 minutes spent on the date of the encounter doing chart review          Return if symptoms worsen or fail to improve.    Valentín Montana MD  [facility] HAFSA Alcazar is a 37 year old, presenting for the following health issues:  Post Hospital Check ([facility])      History of Present Illness       Reason for visit:  Hospital stay    She eats 2-3 servings of fruits and vegetables daily.She consumes 1 sweetened  "beverage(s) daily.She exercises with enough effort to increase her heart rate 10 to 19 minutes per day.  She exercises with enough effort to increase her heart rate 3 or less days per week.   She is taking medications regularly.    Today's PHQ-9         PHQ-9 Total Score: 3    PHQ-9 Q9 Thoughts of better off dead/self-harm past 2 weeks :   Not at all    How difficult have these problems made it for you to do your work, take care of things at home, or get along with other people: Somewhat difficult     -Ischemic Right MCA stroke on 10/24/22  -Still getting home PT setup.  -Slight left lip droop remaining but symptoms improved significantly.  -does not smoke  -Mom side family history of stroke.  -Neurology appointment scheduled on Dec 19 th   -Rheumatology appointment pending  -Cardiology appointment pending.    Hospital Follow-up Visit:    Hospital/Nursing Home/IP Rehab Facility: [facility]  Date of Admission: 10/24/22  Date of Discharge: 10/27/22  Reason(s) for Admission: Stroke    Was your hospitalization related to COVID-19? No   Problems taking medications regularly:  None  Medication changes since discharge: None  Problems adhering to non-medication therapy:  None    Summary of hospitalization:  [facility] hospital discharge summary reviewed  Diagnostic Tests/Treatments reviewed.  Follow up needed: Hemoglobin check in a month  Other Healthcare Providers Involved in Patient s Care:         Specialist appointment - Rheumatology, cardiology, neurology, PT/OT  Update since discharge: improved.         Plan of care communicated with patient             Review of Systems   Constitutional, HEENT, cardiovascular, pulmonary, gi and gu systems are negative, except as otherwise noted.      Objective    /75 (BP Location: Left arm, Patient Position: Chair, Cuff Size: Adult Large)   Pulse 91   Resp 16   Ht 1.664 m (5' 5.5\")   Wt 78 kg (172 lb)   LMP 09/05/2022 (Approximate)   SpO2 100%   BMI 28.19 " kg/m    Body mass index is 28.19 kg/m .  Physical Exam   GENERAL: healthy, alert and no distress,Left arm strength improving.  No sensory deficits.  No concerns with lower extremities.  NECK: no adenopathy, no asymmetry, masses, or scars and thyroid normal to palpation  RESP: lungs clear to auscultation - no rales, rhonchi or wheezes  CV: regular rate and rhythm, normal S1 S2, no S3 or S4, no murmur, click or rub, no peripheral edema and peripheral pulses strong  ABDOMEN: soft, nontender  MS: slight left lip drooping which is improving.  Left arm strength improving.  No sensory deficits.  No concerns with lower extremities.

## 2022-11-02 ENCOUNTER — TELEPHONE (OUTPATIENT)
Dept: FAMILY MEDICINE | Facility: CLINIC | Age: 37
End: 2022-11-02

## 2022-11-02 ENCOUNTER — MYC MEDICAL ADVICE (OUTPATIENT)
Dept: FAMILY MEDICINE | Facility: CLINIC | Age: 37
End: 2022-11-02

## 2022-11-02 ENCOUNTER — MEDICAL CORRESPONDENCE (OUTPATIENT)
Dept: HEALTH INFORMATION MANAGEMENT | Facility: CLINIC | Age: 37
End: 2022-11-02

## 2022-11-02 NOTE — TELEPHONE ENCOUNTER
Forms/Letter Request    Type of form/letter: Short term disability form.     Have you been seen for this request: No    Do we have the form/letter: Yes: Form faxed to us     When is form/letter needed by: ASAP    How would you like the form/letter returned: Fax back to [phone]    Patient Notified form requests are processed in 3-5 business days:    Could we [facility] this information [facility] you [facility] or would you prefer to receive a phone call?:

## 2022-11-02 NOTE — TELEPHONE ENCOUNTER
These forms will need to be completed by provider that saw her yesterday- forms are in my top bin- need to fax to that provider to complete

## 2022-11-02 NOTE — TELEPHONE ENCOUNTER
Routing to provider to review and advise.    Pt was seen yesterday by provider.        Franny Bliss RN  [facility]

## 2022-11-03 DIAGNOSIS — R76.0 LUPUS ANTICOAGULANT POSITIVE: Primary | ICD-10-CM

## 2022-11-04 ENCOUNTER — TELEPHONE (OUTPATIENT)
Dept: FAMILY MEDICINE | Facility: CLINIC | Age: 37
End: 2022-11-04

## 2022-11-04 DIAGNOSIS — I63.511 ACUTE ISCHEMIC RIGHT MCA STROKE (H): Primary | ICD-10-CM

## 2022-11-04 NOTE — TELEPHONE ENCOUNTER
RN called April with [facility] and left detailed message reading provider message below.       If April calls back please confirm she has received the message.    Justine Esteban RN    [facility]

## 2022-11-04 NOTE — TELEPHONE ENCOUNTER
Order/Referral Request    Who is requesting: [facility]-PT    Orders being requested: PT 1 time a week for 2 weeks    Reason service is needed/diagnosis: To set up and exercise program -OT orders to follow     When are orders needed by: HERMANN    Has this been discussed with Provider: No    Does patient have a preference on a Group/Provider/Facility? [facility]    Does patient have an appointment scheduled?: No    Where to send orders: verbal    Could we send this information to you in [facility] or would you prefer to receive a phone call?:   Patient would prefer a phone call   Okay to leave a detailed message?: Yes at Other phone number:    [phone]    Kalina Harry

## 2022-11-08 ENCOUNTER — MYC MEDICAL ADVICE (OUTPATIENT)
Dept: RHEUMATOLOGY | Facility: CLINIC | Age: 37
End: 2022-11-08

## 2022-11-08 DIAGNOSIS — D68.59 LOW PROTEIN C ACTIVITY LEVEL (H): ICD-10-CM

## 2022-11-08 DIAGNOSIS — R29.898 WEAKNESS OF LEFT UPPER EXTREMITY: Primary | ICD-10-CM

## 2022-11-08 DIAGNOSIS — I63.9 STROKE (H): Primary | ICD-10-CM

## 2022-11-08 NOTE — TELEPHONE ENCOUNTER
Referral placed to Hematology per recommendation from Dr. Strickland.    Neva Strickland MD Schuler, Susan, RN  Recommend hematology for evaluation of APS, also eval for long term anticoagulation given low protein C, stroke. A Rheumatology fellow could see at next available in person.     MILAD Go, RN  RN Care Coordinator Rheumatology

## 2022-11-10 ENCOUNTER — TELEPHONE (OUTPATIENT)
Dept: FAMILY MEDICINE | Facility: CLINIC | Age: 37
End: 2022-11-10

## 2022-11-10 ENCOUNTER — ANCILLARY PROCEDURE (OUTPATIENT)
Dept: MRI IMAGING | Facility: CLINIC | Age: 37
End: 2022-11-10
Attending: OBSTETRICS & GYNECOLOGY
Payer: COMMERCIAL

## 2022-11-10 DIAGNOSIS — D25.2 SUBMUCOUS AND SUBSEROUS LEIOMYOMA OF UTERUS: ICD-10-CM

## 2022-11-10 DIAGNOSIS — D25.0 SUBMUCOUS AND SUBSEROUS LEIOMYOMA OF UTERUS: ICD-10-CM

## 2022-11-10 PROCEDURE — A9585 GADOBUTROL INJECTION: HCPCS | Performed by: RADIOLOGY

## 2022-11-10 PROCEDURE — 72197 MRI PELVIS W/O & W/DYE: CPT | Performed by: RADIOLOGY

## 2022-11-10 RX ORDER — GADOBUTROL 604.72 MG/ML
10 INJECTION INTRAVENOUS ONCE
Status: COMPLETED | OUTPATIENT
Start: 2022-11-10 | End: 2022-11-10

## 2022-11-10 RX ADMIN — GADOBUTROL 8 ML: 604.72 INJECTION INTRAVENOUS at 15:17

## 2022-11-10 NOTE — TELEPHONE ENCOUNTER
Form received via fax from [facility]. Short term disability form placed in provider's bin to address.

## 2022-11-11 ENCOUNTER — MEDICAL CORRESPONDENCE (OUTPATIENT)
Dept: HEALTH INFORMATION MANAGEMENT | Facility: CLINIC | Age: 37
End: 2022-11-11

## 2022-11-13 ENCOUNTER — TRANSFERRED RECORDS (OUTPATIENT)
Dept: HEALTH INFORMATION MANAGEMENT | Facility: CLINIC | Age: 37
End: 2022-11-13

## 2022-11-14 DIAGNOSIS — D64.9 LOW HEMOGLOBIN: Primary | ICD-10-CM

## 2022-11-14 NOTE — TELEPHONE ENCOUNTER
Patients surgery has been cancelled per Dr Hernandez's request ( see result notes for 11/10 MR Pelvis).  Notified [facility], cancelled pre-op & covid testing and updated surgery schedules for Mary Velasco.

## 2022-11-14 NOTE — RESULT ENCOUNTER NOTE
Called patient and discussed MRI.  She has had a stroke since I saw her last, which they think may be related to the lysteda she was taking for management of heavy bleeding. She has not tried progesterone only pills, and may consider aygestin for heavy bleeding that is causing significant anemia. She was also told that she had a PFO and possibly lupus. I recommend we cancel the open myomectomy that is scheduled with me at [facility].  Recommend she receive treatment at a location that can better support her comorbidities and has access to all of the management options, including Accessa, which is not available anywhere in the [facility] system. She should not need a referral, but will let us know if she needs assistance in scheduling with an outside group ([place] or similar).    Please cancel surgery that is scheduled for 1/3 and also update Dr. Agbeh's schedule

## 2022-11-14 NOTE — TELEPHONE ENCOUNTER
Form faxed to [facility] [phone], copy placed in abstract and original placed in tc bin.

## 2022-11-17 ENCOUNTER — LAB (OUTPATIENT)
Dept: LAB | Facility: CLINIC | Age: 37
End: 2022-11-17
Payer: COMMERCIAL

## 2022-11-17 DIAGNOSIS — D64.9 LOW HEMOGLOBIN: ICD-10-CM

## 2022-11-17 LAB — HGB BLD-MCNC: 9.6 G/DL (ref 11.7–15.7)

## 2022-11-17 PROCEDURE — 85018 HEMOGLOBIN: CPT

## 2022-11-17 PROCEDURE — 36415 COLL VENOUS BLD VENIPUNCTURE: CPT

## 2022-11-18 ENCOUNTER — MEDICAL CORRESPONDENCE (OUTPATIENT)
Dept: HEALTH INFORMATION MANAGEMENT | Facility: CLINIC | Age: 37
End: 2022-11-18

## 2022-11-21 ENCOUNTER — E-VISIT (OUTPATIENT)
Dept: FAMILY MEDICINE | Facility: CLINIC | Age: 37
End: 2022-11-21
Payer: COMMERCIAL

## 2022-11-21 DIAGNOSIS — N39.0 ACUTE UTI (URINARY TRACT INFECTION): ICD-10-CM

## 2022-11-21 DIAGNOSIS — N30.00 ACUTE CYSTITIS WITHOUT HEMATURIA: ICD-10-CM

## 2022-11-21 DIAGNOSIS — D75.839 THROMBOCYTOSIS: ICD-10-CM

## 2022-11-21 DIAGNOSIS — R30.0 DYSURIA: Primary | ICD-10-CM

## 2022-11-21 PROCEDURE — 99421 OL DIG E/M SVC 5-10 MIN: CPT | Performed by: PHYSICIAN ASSISTANT

## 2022-11-22 ENCOUNTER — LAB (OUTPATIENT)
Dept: LAB | Facility: CLINIC | Age: 37
End: 2022-11-22
Payer: COMMERCIAL

## 2022-11-22 ENCOUNTER — MEDICAL CORRESPONDENCE (OUTPATIENT)
Dept: HEALTH INFORMATION MANAGEMENT | Facility: CLINIC | Age: 37
End: 2022-11-22

## 2022-11-22 DIAGNOSIS — R29.898 WEAKNESS OF LEFT UPPER EXTREMITY: ICD-10-CM

## 2022-11-22 DIAGNOSIS — Z86.73 H/O ISCHEMIC RIGHT MCA STROKE: ICD-10-CM

## 2022-11-22 DIAGNOSIS — R30.0 DYSURIA: Primary | ICD-10-CM

## 2022-11-22 DIAGNOSIS — R30.0 DYSURIA: ICD-10-CM

## 2022-11-22 LAB
ALBUMIN UR-MCNC: NEGATIVE MG/DL
APPEARANCE UR: CLEAR
BACTERIA #/AREA URNS HPF: ABNORMAL /HPF
BASOPHILS # BLD AUTO: 0 10E3/UL (ref 0–0.2)
BASOPHILS NFR BLD AUTO: 0 %
BILIRUB UR QL STRIP: NEGATIVE
COLOR UR AUTO: YELLOW
EOSINOPHIL # BLD AUTO: 0.2 10E3/UL (ref 0–0.7)
EOSINOPHIL NFR BLD AUTO: 2 %
ERYTHROCYTE [DISTWIDTH] IN BLOOD BY AUTOMATED COUNT: 30.5 % (ref 10–15)
GLUCOSE UR STRIP-MCNC: NEGATIVE MG/DL
HCT VFR BLD AUTO: 33.7 % (ref 35–47)
HGB BLD-MCNC: 9.9 G/DL (ref 11.7–15.7)
HGB UR QL STRIP: NEGATIVE
IMM GRANULOCYTES # BLD: 0 10E3/UL
IMM GRANULOCYTES NFR BLD: 0 %
KETONES UR STRIP-MCNC: NEGATIVE MG/DL
LEUKOCYTE ESTERASE UR QL STRIP: ABNORMAL
LYMPHOCYTES # BLD AUTO: 0.9 10E3/UL (ref 0.8–5.3)
LYMPHOCYTES NFR BLD AUTO: 13 %
MCH RBC QN AUTO: 23.2 PG (ref 26.5–33)
MCHC RBC AUTO-ENTMCNC: 29.4 G/DL (ref 31.5–36.5)
MCV RBC AUTO: 79 FL (ref 78–100)
MONOCYTES # BLD AUTO: 0.8 10E3/UL (ref 0–1.3)
MONOCYTES NFR BLD AUTO: 10 %
NEUTROPHILS # BLD AUTO: 5.6 10E3/UL (ref 1.6–8.3)
NEUTROPHILS NFR BLD AUTO: 75 %
NITRATE UR QL: NEGATIVE
NRBC # BLD AUTO: 0 10E3/UL
NRBC BLD AUTO-RTO: 0 /100
PH UR STRIP: 6 [PH] (ref 5–7)
PLATELET # BLD AUTO: 859 10E3/UL (ref 150–450)
RBC # BLD AUTO: 4.27 10E6/UL (ref 3.8–5.2)
RBC #/AREA URNS AUTO: ABNORMAL /HPF
SP GR UR STRIP: 1.02 (ref 1–1.03)
SQUAMOUS #/AREA URNS AUTO: ABNORMAL /LPF
TRICHOMONAS #/AREA URNS HPF: PRESENT /HPF
UROBILINOGEN UR STRIP-ACNC: 0.2 E.U./DL
WBC # BLD AUTO: 7.5 10E3/UL (ref 4–11)
WBC #/AREA URNS AUTO: ABNORMAL /HPF

## 2022-11-22 PROCEDURE — 85025 COMPLETE CBC W/AUTO DIFF WBC: CPT

## 2022-11-22 PROCEDURE — 81001 URINALYSIS AUTO W/SCOPE: CPT

## 2022-11-22 PROCEDURE — 87086 URINE CULTURE/COLONY COUNT: CPT

## 2022-11-22 PROCEDURE — 36415 COLL VENOUS BLD VENIPUNCTURE: CPT

## 2022-11-23 RX ORDER — NITROFURANTOIN 25; 75 MG/1; MG/1
100 CAPSULE ORAL 2 TIMES DAILY
Qty: 14 CAPSULE | Refills: 0 | Status: SHIPPED | OUTPATIENT
Start: 2022-11-23 | End: 2022-11-30

## 2022-11-23 NOTE — RESULT ENCOUNTER NOTE
Dear Ottoniel  Your urine suggests infection.  I have sent over a prescription for nitrofurantoin twice a day for 7 days.  A culture is pending.  Follow up with me if symptoms not improving over the next 3 days   Your platelets are quite high.  I am not sure what to make of this.  Please schedule a nonfasting lab only appointment in approximately 1 week and keep follow up with hematology as scheduled.   Your hemoglobin is stable.   Please call or MyChart my office with any questions or concerns.   Perla Nieto, PAC

## 2022-11-23 NOTE — PATIENT INSTRUCTIONS
Dear Ottoniel Mccormack    After reviewing your responses, I've been able to diagnose you with a urinary tract infection, which is a common infection of the bladder with bacteria.  This is not a sexually transmitted infection, though urinating immediately after intercourse can help prevent infections.  Drinking lots of fluids is also helpful to clear your current infection and prevent the next one.      I have sent a prescription for antibiotics to your pharmacy to treat this infection.    It is important that you take all of your prescribed medication even if your symptoms are improving after a few doses.  Taking all of your medicine helps prevent the symptoms from returning.     If your symptoms worsen, you develop pain in your back or stomach, develop fevers, or are not improving in 5 days, please contact your primary care provider for an appointment or visit any of our convenient Walk-in or Urgent Care Centers to be seen, which can be found on our website here.    Thanks again for choosing us as your health care partner,    Perla Nieto PA-C

## 2022-11-24 LAB — BACTERIA UR CULT: NORMAL

## 2022-11-24 NOTE — RESULT ENCOUNTER NOTE
Dear Ottoniel  Your urine culture did not show infection.  Continue antibiotic until gone and follow up with us if symptoms not improving.  Go to urgent care if abdominal pain, fever, nausea, vomiting or other change in symptoms  Please call or MyChart my office with any questions or concerns.   Perla Nieto, PAC

## 2022-11-25 ENCOUNTER — MEDICAL CORRESPONDENCE (OUTPATIENT)
Dept: HEALTH INFORMATION MANAGEMENT | Facility: CLINIC | Age: 37
End: 2022-11-25

## 2022-11-25 ENCOUNTER — TELEPHONE (OUTPATIENT)
Dept: FAMILY MEDICINE | Facility: CLINIC | Age: 37
End: 2022-11-25

## 2022-11-25 NOTE — TELEPHONE ENCOUNTER
Forms/Letter Request    Type of form/letter: [facility]     Have you been seen for this request: N/A    Do we have the form/letter: Yes:     When is form/letter needed by: asap    How would you like the form/letter returned: Fax number - [phone]

## 2022-11-25 NOTE — TELEPHONE ENCOUNTER
Forms faxed to Jessica Pérez @ [phone] and forwarded encounter to Isaiah Montana- [facility] provider to review.  Flavia Abraham CMA

## 2022-11-25 NOTE — TELEPHONE ENCOUNTER
I have not seen patient for this- forms need to be signed by Isaiah Montana- [facility] Jessica Pérez  Please fax and have her address and fax- I have not seen her since her stroke

## 2022-11-28 ENCOUNTER — VIRTUAL VISIT (OUTPATIENT)
Dept: OBGYN | Facility: CLINIC | Age: 37
End: 2022-11-28
Payer: COMMERCIAL

## 2022-11-28 ENCOUNTER — MEDICAL CORRESPONDENCE (OUTPATIENT)
Dept: HEALTH INFORMATION MANAGEMENT | Facility: CLINIC | Age: 37
End: 2022-11-28

## 2022-11-28 DIAGNOSIS — D25.1 INTRAMURAL, SUBMUCOUS, AND SUBSEROUS LEIOMYOMA OF UTERUS: Primary | ICD-10-CM

## 2022-11-28 DIAGNOSIS — D25.0 INTRAMURAL, SUBMUCOUS, AND SUBSEROUS LEIOMYOMA OF UTERUS: Primary | ICD-10-CM

## 2022-11-28 DIAGNOSIS — D25.2 INTRAMURAL, SUBMUCOUS, AND SUBSEROUS LEIOMYOMA OF UTERUS: Primary | ICD-10-CM

## 2022-11-28 PROCEDURE — 99213 OFFICE O/P EST LOW 20 MIN: CPT | Mod: TEL | Performed by: OBSTETRICS & GYNECOLOGY

## 2022-11-28 RX ORDER — NORETHINDRONE ACETATE 5 MG
5 TABLET ORAL DAILY
Qty: 90 TABLET | Refills: 0 | Status: SHIPPED | OUTPATIENT
Start: 2022-11-28 | End: 2022-12-13

## 2022-11-28 NOTE — PATIENT INSTRUCTIONS
If you have labs or imaging done, the results will automatically release in [employer] Press without an interpretation.  Your health care professional will review those results and send an interpretation with recommendations as soon as possible, but this may be 1-3 business days.    If you have any questions regarding your visit, please contact your care team.     [facility]: [phone]  [facility] CLINIC HOURS TELEPHONE NUMBER       Lamar Hernandez MD  Assistant Medical Director    Renu - Certified Medical Assistant     Evelyn Lara-PRERNA Ennis-  Indu-     Monday- [place]  8:00 a.m - 5:00 p.m    Tuesday- Surgery        Thursday- [place]  8:00 a.m - 5:00 p.m.    Friday- [place]  7:30 a.m - 4:00 p.m. [facility]  [address]  [place], [place] [address] [phone] Fax  Imaging Scheduling [phone]    [facility] Delivery  [address] [place] [address] Dr.  [place], [place] [address] [phone]    [address] [id number] [phone] Fax  Imaging Scheduling [phone]     Urgent Care locations:  [facility] Monday-Friday  10 am - 8 pm  Saturday and Sunday   9 am - 5 pm  Monday-Friday   10 am - 8 pm  Saturday and Sunday   9 am - 5 pm   [phone]     **Surgeries** Our Surgery Schedulers will contact you to schedule. If you do not receive a call within 3 business days, please call [phone].    If you need a medication refill, please contact your pharmacy. Please allow 3 business days for your refill to be completed.    As always, thank you for trusting us with your healthcare needs!

## 2022-11-28 NOTE — PROGRESS NOTES
Ottoniel is a 37 year old who is being evaluated via a billable telephone visit.      What phone number would you like to be contacted at? [phone]  How would you like to obtain your AVS? Gilberto   Provider location- Clinic, on site    OB/GYN      NAME:  Ottoniel Mccormack  PCP:  No Ref-Primary, Physician  MRN:  [id number]    Impression / Plan     37 year old  with:      ICD-10-CM    1. Intramural, submucous, and subserous leiomyoma of uterus  D25.1 norethindrone (AYGESTIN) 5 MG tablet    D25.0     D25.2           Patient was prescribed Aygestin 5 mg daily to help with abnormal bleeding related to uterine fibroids until she can be seen for second opinion.  This may be increased as needed to help with blood flow.  She will contact us if she needs assistance with the appointment at [facility].    Chief Complaint     Chief Complaint   Patient presents with     Consult       HPI     Ottoniel Mccormack is a  37 year old female who is seen for follow-up.    She has had a stroke since I saw her last, which they think may be related to the lysteda she was taking for management of heavy bleeding. She was also told that she had a PFO and possibly lupus.  She desired future pregnancy and uterine preservation, so open myomectomy had been scheduled with me.  MRI was done for preoperative planning, but there were a number of fibroids projecting into the endometrial cavity.   I recommended we cancel the open myomectomy that was scheduled with me at [facility].  I recommended she receive treatment at a location that can better support her comorbidities and has access to all of the management options, including Accessa, which is not available anywhere in the [facility] system. She reached out to [facility] and is waiting to hear back.  She will let us know if she needs a referral.    She has not tried progesterone only pills, and was instructed to consider aygestin for heavy bleeding that is causing significant anemia when I last talked with  her.  She is interested in starting something until she can be seen.         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  7/16 colpo: TARA 2  8/15/16 LEEP: TARA 3 with + endocervical margins  7/2017 NILM, hpv negative 32 y.o.  3/2019 NILM Negative HPV 33 y.o.PLAN, per ASCCP Guidelines:cotest 3/2022  6/17/22 NIL pap, Neg HPV. Plan: cotest in 3 years    *After excision or ablation for TARA 2+, cotest in 6 months, then cotest annually x 3, then cotest every 3 years for at least 25 years. As cervical cancer risk remains above general population levels, continued screening for as long as the pt remains in good health is acceptable. (2019 ASCCP guideline).  Any abnormal pap or + HR HPV test within the 25 years warrants a colposcopy.* (2019 ASCCP advisor answer).             Medications     Current Outpatient Medications   Medication     albuterol (PROAIR HFA) 108 (90 Base) MCG/ACT inhaler     aspirin (ASA) 81 MG chewable tablet     augmented betamethasone dipropionate (DIPROLENE AF) 0.05 % external cream     docusate sodium (COLACE) 100 MG capsule     dupilumab (DUPIXENT) 300 MG/2ML prefilled pen     dupilumab (DUPIXENT) 300 MG/2ML prefilled pen     EPINEPHrine (ANY BX GENERIC EQUIV) 0.3 MG/0.3ML injection 2-pack     escitalopram (LEXAPRO) 20 MG tablet     ferrous sulfate (FEROSUL) 325 (65 Fe) MG tablet     fluticasone (FLONASE) 50 MCG/ACT nasal spray     montelukast (SINGULAIR) 10 MG tablet     nitroFURantoin macrocrystal-monohydrate (MACROBID) 100 MG capsule     QVAR REDIHALER 80 MCG/ACT inhaler     tacrolimus (PROTOPIC) 0.1 % external ointment     traZODone (DESYREL) 50 MG tablet     triamcinolone (KENALOG) 0.1 % external ointment     VITAMIN D PO     SUMAtriptan (IMITREX) 50 MG tablet     No current  facility-administered medications for this visit.        Allergies     Allergies   Allergen Reactions     Fish Anaphylaxis     Shellfish Allergy Hives       ROS     Pertinent positives and negatives are listed in the HPI.     Physical Exam   Vitals: There were no vitals taken for this visit.    Telephone visit.    Labs/Imaging       I have personally reviewed the labs/imaging and the findings were:    MRI 11/10/2022:    Anteverted uterus 14.1 x 13.1 x 8.5 cm    Multiple subserosal, submucosal, and intramural fibroids ranging between 1.8 to 6.5 cm.    Submucosal fibroid at the fundus with more than 50% of its circumference within the endometrial cavity    Multiple other intramural fibroids with less than 50% of their circumference projecting into the endometrial cavity    Distorted endometrial lining, but measures up to 1.2 cm     normal ovaries bilaterally             20 min spent on the date of the encounter in chart review, patient visit, review of tests, documentation  about the issues documented above.   Time on telephone 9 minutes    Lamar Hernandez MD

## 2022-11-30 ENCOUNTER — MEDICAL CORRESPONDENCE (OUTPATIENT)
Dept: HEALTH INFORMATION MANAGEMENT | Facility: CLINIC | Age: 37
End: 2022-11-30

## 2022-12-05 ENCOUNTER — MEDICAL CORRESPONDENCE (OUTPATIENT)
Dept: FAMILY MEDICINE | Facility: CLINIC | Age: 37
End: 2022-12-05

## 2022-12-05 ENCOUNTER — TELEPHONE (OUTPATIENT)
Dept: FAMILY MEDICINE | Facility: CLINIC | Age: 37
End: 2022-12-05

## 2022-12-05 ENCOUNTER — VIRTUAL VISIT (OUTPATIENT)
Dept: FAMILY MEDICINE | Facility: CLINIC | Age: 37
End: 2022-12-05
Payer: COMMERCIAL

## 2022-12-05 DIAGNOSIS — Z86.73 H/O ISCHEMIC RIGHT MCA STROKE: Primary | ICD-10-CM

## 2022-12-05 DIAGNOSIS — R29.898 WEAKNESS OF LEFT UPPER EXTREMITY: ICD-10-CM

## 2022-12-05 PROCEDURE — 99214 OFFICE O/P EST MOD 30 MIN: CPT | Mod: GT | Performed by: FAMILY MEDICINE

## 2022-12-05 NOTE — TELEPHONE ENCOUNTER
Received Attending Physician's Statement form from [employer] via fax. Placed in Dr Babcock's box. Patient had a Virtual appt today 12/5/2022.  [facility]  2nd Floor  Primary Care

## 2022-12-05 NOTE — PROGRESS NOTES
"Ottoniel is a 37 year old who is being evaluated via a billable video visit.      How would you like to obtain your AVS? MyChart  If the video visit is dropped, the invitation should be resent by: Text to cell phone: [phone]  Will anyone else be joining your video visit? No          Assessment & Plan     H/O ischemic right MCA stroke  -Ottoniel presented as virtual visit to extend her disability since she still has weakness in fingers of left hand.  Her job mostly involves typing and with finger weakness it makes it hard.    -H/o stroke on 10/24/2022 with MRI showing multiple acute infarcts within right MCA distribution.  Cortical involvement at multiple locations.  Symptoms improving.  -She has follow-up with neurology on December 19th .  -Ongoing home occupational therapy.  Feels like her left upper extremity strength is improving.  -Continues to take aspirin 81 mg.    Weakness of left upper extremity  -Improvement in left upper extremity strength with home OT.  -Still has weakness in her left hand fingers most pronounced in index and middle finger.  -Completed 5 sessions of home OT.  Needs referral to outpatient OT.  -Also prefers short-term disability extension.  -I am waiting on Occupational Therapy note to get an estimate to decide how long to extend her short-term disability.         BMI:   Estimated body mass index is 28.19 kg/m  as calculated from the following:    Height as of 11/1/22: 1.664 m (5' 5.5\").    Weight as of 11/1/22: 78 kg (172 lb).   Weight management plan: Discussed healthy diet and exercise guidelines        No follow-ups on file.    Valentín Montana MD  [facility]    Sheri Alcazar is a 37 year old, presenting for the following health issues:  No chief complaint on file.      History of Present Illness       Reason for visit:  Stroke    She eats 2-3 servings of fruits and vegetables daily.She consumes 1 sweetened beverage(s) daily.She " exercises with enough effort to increase her heart rate 30 to 60 minutes per day.  She exercises with enough effort to increase her heart rate 3 or less days per week.   She is taking medications regularly.       Cerebrovascular Follow-up      Patient history: ischemic cerebrovascular incident    Residual symptoms: None and Weakness in the arm on the left    Worsened or new symptoms since last visit: No    Daily aspirin use: Yes    Hypertension controlled: Yes      How many servings of fruits and vegetables do you eat daily?  2-3    On average, how many sweetened beverages do you drink each day (Examples: soda, juice, sweet tea, etc.  Do NOT count diet or artificially sweetened beverages)?   1    How many days per week do you exercise enough to make your heart beat faster? 3 or less    How many minutes a day do you exercise enough to make your heart beat faster? 30 - 60    How many days per week do you miss taking your medication? 0        Review of Systems   Constitutional, HEENT, cardiovascular, pulmonary, gi and gu systems are negative, except as otherwise noted.      Objective           Vitals:  No vitals were obtained today due to virtual visit.    Physical Exam   GENERAL: Healthy, alert and no distress  EYES: Eyes grossly normal to inspection.  No discharge or erythema, or obvious scleral/conjunctival abnormalities.  RESP: No audible wheeze, cough, or visible cyanosis.  No visible retractions or increased work of breathing.    SKIN: Visible skin clear. No significant rash, abnormal pigmentation or lesions.  NEURO: Cranial nerves grossly intact.  Mentation and speech appropriate for age.  PSYCH: Mentation appears normal, affect normal/bright, judgement and insight intact, normal speech and appearance well-groomed.                Video-Visit Details    Video Start Time:5.00 pm    Type of service:  Video Visit    Video End Time:5.20 pm    Originating Location (pt. Location): Home        Distant Location (provider  location):  On-site    Platform used for Video Visit: Mimi

## 2022-12-06 ENCOUNTER — MYC MEDICAL ADVICE (OUTPATIENT)
Dept: FAMILY MEDICINE | Facility: CLINIC | Age: 37
End: 2022-12-06

## 2022-12-08 ENCOUNTER — MYC MEDICAL ADVICE (OUTPATIENT)
Dept: OBGYN | Facility: CLINIC | Age: 37
End: 2022-12-08

## 2022-12-08 DIAGNOSIS — D25.0 INTRAMURAL, SUBMUCOUS, AND SUBSEROUS LEIOMYOMA OF UTERUS: ICD-10-CM

## 2022-12-08 DIAGNOSIS — D25.2 INTRAMURAL, SUBMUCOUS, AND SUBSEROUS LEIOMYOMA OF UTERUS: ICD-10-CM

## 2022-12-08 DIAGNOSIS — D25.1 INTRAMURAL, SUBMUCOUS, AND SUBSEROUS LEIOMYOMA OF UTERUS: ICD-10-CM

## 2022-12-08 NOTE — TELEPHONE ENCOUNTER
"Pt last had VV with Dr. Hernandez on 11/28.  At this time Dr. Hernandez prescribed Aygestin for pt to help with her heavy bleeding related to uterine fibroids until she is able to be seen by [facility] for a second opinion.    It appears pt is still scheduled for an open myomectomy with Dr. Hernandez on 1/3/23.  However, the VV note states that Dr. Hernandez recommends \"she receive treatment at a location that can better support her comorbidities and has access to all of the management options, including Accessa, which is not available anywhere in the [facility] system.\"   "

## 2022-12-09 NOTE — TELEPHONE ENCOUNTER
Faxed provider signed [employer] Attending Physician's Statement form. Faxed to [employer], [phone], right fax confirmed at 6:44 am today, 12/9/2022. Copy to TC and abstracting.  [facility]eron Ho [facility]  2nd Floor  Primary Care

## 2022-12-11 NOTE — PROGRESS NOTES
"Rheumatology Clinic Visit New Patient     Ottoniel Mccormack MRN# [id number]   YOB: 1985 Age: 37 year old     Date of Visit: 12/12/2022  Primary care provider: No Ref-Primary, Physician     Assessment & Plan   Ottoniel is a 37 year old female who presents today for assessment after R MCA ischemic stroke (10/24/22) with +lupus AC and indeterminate +dsDNA though without other features concerning for SLE.     #History of ischemic stroke, R MCA territory  #Indeterminate positive dsDNA, 1x LAC+  Mrs. Mccormack presents for evaluation for the presence of a connective tissue disease and/or autoimmune mediated hypercoagulability after a recent ischemic stroke.  She had 1x positive LAC, indeterminate dsDNA, and an otherwise negative PRANAV, EMRE, and antiphospholipid antibodies.  A positive PRANAV is considered \"entry criteria\" for consideration of a diagnosis of SLE.  She does not have classic small joint arthritis, malar rash, photosensitivity, aphthous ulcers, pleurisy, leukopenia or thrombocytopenia, or other manifestations of lupus.  It can be safely said she does not have lupus.      It is unclear at this time, and premature to state that she has antiphospholipid antibody syndrome with positive lupus anticoagulant.  The diagnosis requires 2 positive tests  by 3 months.  Furthermore, her confirmatory antibody tests were negative for B2GP1 and CL antibodies. Instead, an alternative explanation for her stroke could be related to a unfortunate conjunction of medication effect (tranexamic acid), PFO, and protein C deficiency.  She has follow-up with hematology next week who can better assess the question of hypercoagulability.    -- Repeat anti-dsDNA antibody  -- Can f/u PRN with Rheumatology should further concerns arise  -- Appreciate involvement of hematology in her ongoing evaluation    Patient's presentation and assessment were reviewed and discussed with the attending physician, Dr. Monroe Wilson. This note " "reflects our joint decision-making.     Brody \"YANNA\" MD Jorge, PhD  PGY-3 Rheumatology Fellow  [id number]    Staff addendum  I performed the history and physical examination of the patient and discussed the management with the fellow. I reviewed the available lab and imaging studies. I reviewed the fellow's note and agree with the documented findings and plan of care.    I spent a total of 30 minutes on the date of service on chart review, patient encounter with fellow physician Dr Patel and coordination of patient care.      Monroe Wilson MD  Rheumatology      History of Present Illness   Ottoniel Mccormack is a 37 year old female who presents for evaluation of  +LAC and dsDNA in the context of recent stroke    Rheumatology Synopsis:  Presentation:   Ischemic stroke R MCA distribution with showering of cortical infarcts (10/24/2022).  Work-up notable for + lupus anticoagulant, + dsDNA, protein C deficit.  Patient is on tranexamic acid at the time of her stroke, now since discontinued.  Awaiting hematology assessment.  History of atopic dermatitis, leiomyoma.  No personal or family history of blood clots.  No prior pregnancies.   Serologies/Findings:   (+) Indeterminant dsDNA (6, negative by N laboratory cutoff)  (-)  ANCA, PRANAV, Sm, SSA, SSB, RNP, CM, Scl-70, Marci-1, B2GP1, aCL, RPR, factor V Leiden, factor II mutation  Complications/Involvement:   R MCA stroke (10/24/2022)  Uterine leiomyoma  Asthma/atopy -albuterol, beclomethasone inhaler, Dupixent, Flonase, montelukast  PID (11/2022)- recent course of treatment for trichomonas  Current Therapy:    Dupixent - for atopic dermatitis     Aspirin started after stroke  Previous Therapies:    Tranexamic acid - 06/2022, 2-3 months, for heavy menstrual flow, stopped after stroke    Mrs. Mccormack presents to rheumatology for concerns related to a recent episode of stroke.  She presented to [facility] 10/24/2022 with new onset left-sided weakness.  Brain MRI revealed " acute stroke with shower of emboli in the right MCA distribution.  Work-up for coagulopathy/thrombophilia was performed, notable for + lupus anticoagulant and low protein C level.  She had borderline elevated CRP and indeterminate positive dsDNA antibody.  Because of uterine fibroids she has chronic anemia of blood loss with superimposed iron deficiency, she had taken the procoagulant tranexamic acid (stopped after stroke) for 2 months prior to the episode.  ELIZABETH notable for small PFO, unclear if reason for CVA.  Cerebral angiography not consistent with vasculitis.    Labs at the time of stroke notable for: WBC 3.9, hemoglobin 8.9, MCV 69, RDW 23.8, platelet 415, absolute lymphocyte 0.8.  Ferritin 3, iron saturation 90%.  Normal UA, CMP.     Since the time of her stroke she has continued to participate in rehab, with notable improvements to her strength and function on the left side.  Currently she notices mild weakness in the left hand and foot.  Previous facial droop is resolved entirely.    She was recently treated with a course of antibiotics for pelvic inflammatory disease related to trichomonas infection.  She did have fevers with this, which have resolved. Her last day of antibiotics is today, but she still has some left abdominal pain.    When asked to reflect on her symptoms over the past 6 months she describes generally good health. She has had palpitations with anemia related to uterine leiomyoma. She she recalls developing bilateral painful leg swelling with a flight to [place] in May, legs took at least 2 days to return to normal after returning home.  She notes her legs will get very stiff during long car trips. She has RLS at night (notable iron deficiency anemia).  She describes easy bruising on her legs with topical steroids for her atopic dermatitis, worsened since starting aspirin.  She has been on dupixent for the past 4 months to good effect for controlling her skin issues, some improvement  as well to previously noted seborrheic dermatitis and alopecia. She has bifrontal headaches, tension type.  She describes rare ankle arthralgias in the evening, no morning stiffness.     Review of Systems    Associated symptoms include alopecia, bleeding/clotting problems, fevers, new headache, palpitations and stroke.   Denies associated arthralgia, depression, fatigue, joint pain, memory loss, morning stiffness, nausea, nodules, oral ulcers, pleurisy, polydypsia, polyuria and seizures.      Past Medical History   No past medical history on file.   Past Surgical History:   Procedure Laterality Date     LEEP TX, CERVICAL        Patient Active Problem List    Diagnosis Date Noted     Major depression, recurrent (H) 11/01/2022     Priority: Medium     Cervical cancer screening 07/01/2016     Priority: Medium     2013 NILM  2016 ASCUS, Positive for High Risk HPV type 16  30 y.o.  7/16 colpo: TARA 2  8/15/16 LEEP: TARA 3 with + endocervical margins  7/2017 NILM, hpv negative 32 y.o.  3/2019 NILM Negative HPV 33 y.o.PLAN, per ASCCP Guidelines:cotest 3/2022  6/17/22 NIL pap, Neg HPV. Plan: cotest in 3 years    *After excision or ablation for TARA 2+, cotest in 6 months, then cotest annually x 3, then cotest every 3 years for at least 25 years. As cervical cancer risk remains above general population levels, continued screening for as long as the pt remains in good health is acceptable. (2019 ASCCP guideline).  Any abnormal pap or + HR HPV test within the 25 years warrants a colposcopy.* (2019 ASCCP advisor answer).            Family History   Problem Relation Age of Onset     Hypertension Mother      Other - See Comments Father 67        Julia - thinks pancreatic cancer      No Known Problems Brother      Diabetes Maternal Grandmother         adult      No Known Problems Brother      No Known Problems Brother      No Known Problems Brother      No Known Problems Brother      Breast Cancer No family hx of      Colon Cancer  No family hx of       Allergies   Allergen Reactions     Fish Anaphylaxis     Shellfish Allergy Hives      Current Outpatient Medications   Medication Sig Dispense Refill     albuterol (PROAIR HFA) 108 (90 Base) MCG/ACT inhaler INHALE 1 TO 2 PUFFS BY MOUTH EVERY 6 HOURS AS NEEDED FOR WHEEZING 18 g 4     aspirin (ASA) 81 MG chewable tablet Take 81 mg by mouth       augmented betamethasone dipropionate (DIPROLENE AF) 0.05 % external cream Apply twice daily as needed. 50 g 6     docusate sodium (COLACE) 100 MG capsule Take 1 capsule (100 mg) by mouth 2 times daily 30 capsule 1     dupilumab (DUPIXENT) 300 MG/2ML prefilled pen Inject 4 mLs (600 mg) Subcutaneous See Admin Instructions for 1 dose 4 mL 0     dupilumab (DUPIXENT) 300 MG/2ML prefilled pen Inject 2 mLs (300 mg) Subcutaneous every 14 days 4 mL 11     EPINEPHrine (ANY BX GENERIC EQUIV) 0.3 MG/0.3ML injection 2-pack Inject into the muscle as directed for anaphylaxis 2 each 1     escitalopram (LEXAPRO) 20 MG tablet Take 1 tablet (20 mg) by mouth daily 90 tablet 1     ferrous sulfate (FEROSUL) 325 (65 Fe) MG tablet Take 1 tablet (325 mg) by mouth daily (with breakfast) 30 tablet 1     fluticasone (FLONASE) 50 MCG/ACT nasal spray Spray 1 spray into both nostrils daily 16 g 3     montelukast (SINGULAIR) 10 MG tablet TAKE 1 TABLET BY MOUTH EVERYDAY AT BEDTIME 90 tablet 1     norethindrone (AYGESTIN) 5 MG tablet Take 1 tablet (5 mg) by mouth daily 90 tablet 0     QVAR REDIHALER 80 MCG/ACT inhaler Inhale 1 puff into the lungs 2 times daily 10.6 g 5     tacrolimus (PROTOPIC) 0.1 % external ointment Apply twice daily as needed. 30 g 4     traZODone (DESYREL) 50 MG tablet 1-2 at bedtime as needed for sleep. 180 tablet 1     VITAMIN D PO        SUMAtriptan (IMITREX) 50 MG tablet Take 1 tablet (50 mg) by mouth at onset of headache for migraine May repeat in 2 hours. Max 4 tablets/24 hours. (Patient not taking: Reported on 11/28/2022) 9 tablet 0     triamcinolone (KENALOG)  "0.1 % external ointment Apply topically 2 times daily For 14 days - not use for 14 days then repeat as needed (Patient not taking: Reported on 12/5/2022) 80 g 1        Physical Exam   Blood pressure (!) 119/91, pulse 91, height 1.664 m (5' 5.51\"), weight 81 kg (178 lb 8 oz), last menstrual period 11/28/2022, SpO2 99 %, not currently breastfeeding.  Wt Readings from Last 4 Encounters:   12/12/22 81 kg (178 lb 8 oz)   11/01/22 78 kg (172 lb)   08/01/22 82.1 kg (181 lb)   06/24/22 80.9 kg (178 lb 6.4 oz)     Physical Exam  Constitutional:       General: She is not in acute distress.     Appearance: Normal appearance.   HENT:      Head: Normocephalic and atraumatic.      Nose: Nose normal.      Mouth/Throat:      Mouth: Mucous membranes are moist.      Pharynx: Oropharynx is clear.   Eyes:      Extraocular Movements: Extraocular movements intact.      Conjunctiva/sclera: Conjunctivae normal.   Cardiovascular:      Rate and Rhythm: Normal rate and regular rhythm.      Pulses: Normal pulses.      Heart sounds: Normal heart sounds.   Pulmonary:      Effort: Pulmonary effort is normal.      Breath sounds: Normal breath sounds.   Abdominal:      General: Abdomen is flat.      Tenderness: There is abdominal tenderness (Mild, left sided).   Musculoskeletal:         General: No swelling or tenderness. Normal range of motion.      Cervical back: Normal range of motion and neck supple.   Lymphadenopathy:      Cervical: No cervical adenopathy.   Skin:     General: Skin is warm and dry.      Findings: No rash.   Neurological:      Mental Status: She is alert and oriented to person, place, and time.      Motor: Weakness (4/5  strength and ankle dorsiflexion plantarflexion on the left.  Normal on the right.) present.      Coordination: Coordination normal.      Gait: Gait normal.   Psychiatric:         Mood and Affect: Mood normal.         Behavior: Behavior normal.         Joint Exam 12/12/2022     All documented joints were " normal       Data    12/12/2022   ALFARO-28 (ESR) --   ALFARO-28 (CRP) 1.29 (Remission)   Tender (ALFARO-28) 0 / 28    Swollen (ALFARO-28) 0 / 28    Provider Global 0 mm   Patient Global 0 mm   ESR --   CRP 1.5 mg/L     Results for orders placed or performed in visit on 12/12/22   Progesterone     Status: None   Result Value Ref Range    Progesterone 0.3 ng/mL   CBC with platelets and differential     Status: Abnormal   Result Value Ref Range    WBC Count 8.4 4.0 - 11.0 10e3/uL    RBC Count 3.95 3.80 - 5.20 10e6/uL    Hemoglobin 9.8 (L) 11.7 - 15.7 g/dL    Hematocrit 30.9 (L) 35.0 - 47.0 %    MCV 78 78 - 100 fL    MCH 24.8 (L) 26.5 - 33.0 pg    MCHC 31.7 31.5 - 36.5 g/dL    RDW 27.2 (H) 10.0 - 15.0 %    Platelet Count 519 (H) 150 - 450 10e3/uL    % Neutrophils 81 %    % Lymphocytes 10 %    % Monocytes 7 %    % Eosinophils 1 %    % Basophils 1 %    % Immature Granulocytes 0 %    NRBCs per 100 WBC 0 <1 /100    Absolute Neutrophils 6.8 1.6 - 8.3 10e3/uL    Absolute Lymphocytes 0.8 0.8 - 5.3 10e3/uL    Absolute Monocytes 0.6 0.0 - 1.3 10e3/uL    Absolute Eosinophils 0.1 0.0 - 0.7 10e3/uL    Absolute Basophils 0.0 0.0 - 0.2 10e3/uL    Absolute Immature Granulocytes 0.0 <=0.4 10e3/uL    Absolute NRBCs 0.0 10e3/uL   CBC with platelets and differential     Status: Abnormal    Narrative    The following orders were created for panel order CBC with platelets and differential.  Procedure                               Abnormality         Status                     ---------                               -----------         ------                     CBC with platelets and d...[[id number]]  Abnormal            Final result                 Please view results for these tests on the individual orders.       Recent Labs   Lab Test 11/22/22  1548 11/17/22  1500 11/01/22  1139   WBC 7.5  --  5.9   RBC 4.27  --  4.92   HGB 9.9* 9.6* 9.5*   HCT 33.7*  --  33.6*   MCV 79  --  68*   RDW 30.5*  --  24.8*   *  --  201

## 2022-12-12 ENCOUNTER — LAB (OUTPATIENT)
Dept: LAB | Facility: CLINIC | Age: 37
End: 2022-12-12
Attending: FAMILY MEDICINE
Payer: COMMERCIAL

## 2022-12-12 ENCOUNTER — OFFICE VISIT (OUTPATIENT)
Dept: RHEUMATOLOGY | Facility: CLINIC | Age: 37
End: 2022-12-12
Attending: FAMILY MEDICINE
Payer: COMMERCIAL

## 2022-12-12 VITALS
WEIGHT: 178.5 LBS | HEART RATE: 91 BPM | BODY MASS INDEX: 28.69 KG/M2 | DIASTOLIC BLOOD PRESSURE: 91 MMHG | SYSTOLIC BLOOD PRESSURE: 119 MMHG | OXYGEN SATURATION: 99 % | HEIGHT: 66 IN

## 2022-12-12 DIAGNOSIS — D75.839 THROMBOCYTOSIS: ICD-10-CM

## 2022-12-12 DIAGNOSIS — Z31.41 FERTILITY TESTING: ICD-10-CM

## 2022-12-12 DIAGNOSIS — R76.0 LUPUS ANTICOAGULANT POSITIVE: Primary | ICD-10-CM

## 2022-12-12 DIAGNOSIS — R76.0 LUPUS ANTICOAGULANT POSITIVE: ICD-10-CM

## 2022-12-12 LAB
BASOPHILS # BLD AUTO: 0 10E3/UL (ref 0–0.2)
BASOPHILS NFR BLD AUTO: 1 %
EOSINOPHIL # BLD AUTO: 0.1 10E3/UL (ref 0–0.7)
EOSINOPHIL NFR BLD AUTO: 1 %
ERYTHROCYTE [DISTWIDTH] IN BLOOD BY AUTOMATED COUNT: 27.2 % (ref 10–15)
HCT VFR BLD AUTO: 30.9 % (ref 35–47)
HGB BLD-MCNC: 9.8 G/DL (ref 11.7–15.7)
IMM GRANULOCYTES # BLD: 0 10E3/UL
IMM GRANULOCYTES NFR BLD: 0 %
LYMPHOCYTES # BLD AUTO: 0.8 10E3/UL (ref 0.8–5.3)
LYMPHOCYTES NFR BLD AUTO: 10 %
MCH RBC QN AUTO: 24.8 PG (ref 26.5–33)
MCHC RBC AUTO-ENTMCNC: 31.7 G/DL (ref 31.5–36.5)
MCV RBC AUTO: 78 FL (ref 78–100)
MONOCYTES # BLD AUTO: 0.6 10E3/UL (ref 0–1.3)
MONOCYTES NFR BLD AUTO: 7 %
NEUTROPHILS # BLD AUTO: 6.8 10E3/UL (ref 1.6–8.3)
NEUTROPHILS NFR BLD AUTO: 81 %
NRBC # BLD AUTO: 0 10E3/UL
NRBC BLD AUTO-RTO: 0 /100
PLATELET # BLD AUTO: 519 10E3/UL (ref 150–450)
PROGEST SERPL-MCNC: 0.3 NG/ML
RBC # BLD AUTO: 3.95 10E6/UL (ref 3.8–5.2)
WBC # BLD AUTO: 8.4 10E3/UL (ref 4–11)

## 2022-12-12 PROCEDURE — 99203 OFFICE O/P NEW LOW 30 MIN: CPT | Mod: GC | Performed by: STUDENT IN AN ORGANIZED HEALTH CARE EDUCATION/TRAINING PROGRAM

## 2022-12-12 PROCEDURE — 84144 ASSAY OF PROGESTERONE: CPT

## 2022-12-12 PROCEDURE — G0463 HOSPITAL OUTPT CLINIC VISIT: HCPCS

## 2022-12-12 PROCEDURE — 36415 COLL VENOUS BLD VENIPUNCTURE: CPT

## 2022-12-12 PROCEDURE — 86225 DNA ANTIBODY NATIVE: CPT

## 2022-12-12 PROCEDURE — 85025 COMPLETE CBC W/AUTO DIFF WBC: CPT

## 2022-12-12 ASSESSMENT — DISEASE ACTIVITY SCORE (DAS28): CRP_MG_PER_LITER: 1.29

## 2022-12-12 ASSESSMENT — PAIN SCALES - GENERAL: PAINLEVEL: NO PAIN (0)

## 2022-12-12 ASSESSMENT — JOINT PAIN
TOTAL NUMBER OF TENDER JOINTS: 0
TOTAL NUMBER OF SWOLLEN JOINTS: 0

## 2022-12-12 NOTE — NURSING NOTE
"Chief Complaint   Patient presents with     Consult     Consult for lupus anticoagulant positive     BP (!) 119/91 (BP Location: Left arm, Patient Position: Sitting, Cuff Size: Adult Regular)   Pulse 91   Ht 1.664 m (5' 5.51\")   Wt 81 kg (178 lb 8 oz)   LMP 11/28/2022 (Exact Date)   SpO2 99%   BMI 29.24 kg/m      Sena Shaffer on 12/12/2022 at 8:24 AM    "

## 2022-12-12 NOTE — PATIENT INSTRUCTIONS
It was a pleasure to see you today.     I do not think you have an autoimmune disease like Lupus  Let's redo the dsDNA antibody check to see if this is still positive, if this is positive again we may need to touch base in ~6mo basis to reassess how you're feeling  If you have a rash or new symptom question, please upload a picture or message through [employer] to touch base  Your upcoming hematology test will be very helpful, I hope    I will contact you through [employer] regarding the results of your test. If it is negative, this is especially reassuring and we would be able to see each other on an as-needed basis if new issues arise.

## 2022-12-12 NOTE — LETTER
"12/12/2022       RE: Ottoniel Mccormack  [address] [place] [address]     Dear Colleague,    Thank you for referring your patient, Ottoniel Mccormack, to the [facility] RHEUMATOLOGY at [facility]. Please see a copy of my visit note below.    Rheumatology Clinic Visit New Patient     Ottoniel Mccormack MRN# [id number]   YOB: 1985 Age: 37 year old     Date of Visit: 12/12/2022  Primary care provider: No Ref-Primary, Physician     Assessment & Plan   Ottoniel is a 37 year old female who presents today for assessment after R MCA ischemic stroke (10/24/22) with +lupus AC and indeterminate +dsDNA though without other features concerning for SLE.     #History of ischemic stroke, R MCA territory  #Indeterminate positive dsDNA, 1x LAC+  Mrs. Mccormack presents for evaluation for the presence of a connective tissue disease and/or autoimmune mediated hypercoagulability after a recent ischemic stroke.  She had 1x positive LAC, indeterminate dsDNA, and an otherwise negative PRANAV, EMRE, and antiphospholipid antibodies.  A positive PRANAV is considered \"entry criteria\" for consideration of a diagnosis of SLE.  She does not have classic small joint arthritis, malar rash, photosensitivity, aphthous ulcers, pleurisy, leukopenia or thrombocytopenia, or other manifestations of lupus.  It can be safely said she does not have lupus.      It is unclear at this time, and premature to state that she has antiphospholipid antibody syndrome with positive lupus anticoagulant.  The diagnosis requires 2 positive tests  by 3 months.  Furthermore, her confirmatory antibody tests were negative for B2GP1 and CL antibodies. Instead, an alternative explanation for her stroke could be related to a unfortunate conjunction of medication effect (tranexamic acid), PFO, and protein C deficiency.  She has follow-up with hematology next week who can better assess the question of " "hypercoagulability.    -- Repeat anti-dsDNA antibody  -- Can f/u PRN with Rheumatology should further concerns arise  -- Appreciate involvement of hematology in her ongoing evaluation    Patient's presentation and assessment were reviewed and discussed with the attending physician, Dr. Monroe Wilson. This note reflects our joint decision-making.     Brody \"BJ\" MD Jorge, PhD  PGY-3 Rheumatology Fellow  [id number]    History of Present Illness   Ottoniel Mccormack is a 37 year old female who presents for evaluation of  +LAC and dsDNA in the context of recent stroke    Rheumatology Synopsis:  Presentation:   Ischemic stroke R MCA distribution with showering of cortical infarcts (10/24/2022).  Work-up notable for + lupus anticoagulant, + dsDNA, protein C deficit.  Patient is on tranexamic acid at the time of her stroke, now since discontinued.  Awaiting hematology assessment.  History of atopic dermatitis, leiomyoma.  No personal or family history of blood clots.  No prior pregnancies.   Serologies/Findings:   (+) Indeterminant dsDNA (6, negative by N laboratory cutoff)  (-)  ANCA, PRANAV, Sm, SSA, SSB, RNP, CM, Scl-70, Marci-1, B2GP1, aCL, RPR, factor V Leiden, factor II mutation  Complications/Involvement:   R MCA stroke (10/24/2022)  Uterine leiomyoma  Asthma/atopy -albuterol, beclomethasone inhaler, Dupixent, Flonase, montelukast  PID (11/2022)- recent course of treatment for trichomonas  Current Therapy:    Dupixent - for atopic dermatitis     Aspirin started after stroke  Previous Therapies:    Tranexamic acid - 06/2022, 2-3 months, for heavy menstrual flow, stopped after stroke    Mrs. Mccormack presents to rheumatology for concerns related to a recent episode of stroke.  She presented to [facility] 10/24/2022 with new onset left-sided weakness.  Brain MRI revealed acute stroke with shower of emboli in the right MCA distribution.  Work-up for coagulopathy/thrombophilia was performed, notable for + lupus " anticoagulant and low protein C level.  She had borderline elevated CRP and indeterminate positive dsDNA antibody.  Because of uterine fibroids she has chronic anemia of blood loss with superimposed iron deficiency, she had taken the procoagulant tranexamic acid (stopped after stroke) for 2 months prior to the episode.  ELIZABETH notable for small PFO, unclear if reason for CVA.  Cerebral angiography not consistent with vasculitis.    Labs at the time of stroke notable for: WBC 3.9, hemoglobin 8.9, MCV 69, RDW 23.8, platelet 415, absolute lymphocyte 0.8.  Ferritin 3, iron saturation 90%.  Normal UA, CMP.     Since the time of her stroke she has continued to participate in rehab, with notable improvements to her strength and function on the left side.  Currently she notices mild weakness in the left hand and foot.  Previous facial droop is resolved entirely.    She was recently treated with a course of antibiotics for pelvic inflammatory disease related to trichomonas infection.  She did have fevers with this, which have resolved. Her last day of antibiotics is today, but she still has some left abdominal pain.    When asked to reflect on her symptoms over the past 6 months she describes generally good health. She has had palpitations with anemia related to uterine leiomyoma. She she recalls developing bilateral painful leg swelling with a flight to [place] in May, legs took at least 2 days to return to normal after returning home.  She notes her legs will get very stiff during long car trips. She has RLS at night (notable iron deficiency anemia).  She describes easy bruising on her legs with topical steroids for her atopic dermatitis, worsened since starting aspirin.  She has been on dupixent for the past 4 months to good effect for controlling her skin issues, some improvement as well to previously noted seborrheic dermatitis and alopecia. She has bifrontal headaches, tension type.  She describes rare ankle  arthralgias in the evening, no morning stiffness.     Review of Systems    Associated symptoms include alopecia, bleeding/clotting problems, fevers, new headache, palpitations and stroke.   Denies associated arthralgia, depression, fatigue, joint pain, memory loss, morning stiffness, nausea, nodules, oral ulcers, pleurisy, polydypsia, polyuria and seizures.      Past Medical History   No past medical history on file.   Past Surgical History:   Procedure Laterality Date     LEEP TX, CERVICAL        Patient Active Problem List    Diagnosis Date Noted     Major depression, recurrent (H) 11/01/2022     Priority: Medium     Cervical cancer screening 07/01/2016     Priority: Medium     2013 NILM  2016 ASCUS, Positive for High Risk HPV type 16  30 y.o.  7/16 colpo: TARA 2  8/15/16 LEEP: TARA 3 with + endocervical margins  7/2017 NILM, hpv negative 32 y.o.  3/2019 NILM Negative HPV 33 y.o.PLAN, per ASCCP Guidelines:cotest 3/2022  6/17/22 NIL pap, Neg HPV. Plan: cotest in 3 years    *After excision or ablation for TARA 2+, cotest in 6 months, then cotest annually x 3, then cotest every 3 years for at least 25 years. As cervical cancer risk remains above general population levels, continued screening for as long as the pt remains in good health is acceptable. (2019 ASCCP guideline).  Any abnormal pap or + HR HPV test within the 25 years warrants a colposcopy.* (2019 ASCCP advisor answer).            Family History   Problem Relation Age of Onset     Hypertension Mother      Other - See Comments Father 67        Julia - thinks pancreatic cancer      No Known Problems Brother      Diabetes Maternal Grandmother         adult      No Known Problems Brother      No Known Problems Brother      No Known Problems Brother      No Known Problems Brother      Breast Cancer No family hx of      Colon Cancer No family hx of       Allergies   Allergen Reactions     Fish Anaphylaxis     Shellfish Allergy Hives      Current Outpatient  Medications   Medication Sig Dispense Refill     albuterol (PROAIR HFA) 108 (90 Base) MCG/ACT inhaler INHALE 1 TO 2 PUFFS BY MOUTH EVERY 6 HOURS AS NEEDED FOR WHEEZING 18 g 4     aspirin (ASA) 81 MG chewable tablet Take 81 mg by mouth       augmented betamethasone dipropionate (DIPROLENE AF) 0.05 % external cream Apply twice daily as needed. 50 g 6     docusate sodium (COLACE) 100 MG capsule Take 1 capsule (100 mg) by mouth 2 times daily 30 capsule 1     dupilumab (DUPIXENT) 300 MG/2ML prefilled pen Inject 4 mLs (600 mg) Subcutaneous See Admin Instructions for 1 dose 4 mL 0     dupilumab (DUPIXENT) 300 MG/2ML prefilled pen Inject 2 mLs (300 mg) Subcutaneous every 14 days 4 mL 11     EPINEPHrine (ANY BX GENERIC EQUIV) 0.3 MG/0.3ML injection 2-pack Inject into the muscle as directed for anaphylaxis 2 each 1     escitalopram (LEXAPRO) 20 MG tablet Take 1 tablet (20 mg) by mouth daily 90 tablet 1     ferrous sulfate (FEROSUL) 325 (65 Fe) MG tablet Take 1 tablet (325 mg) by mouth daily (with breakfast) 30 tablet 1     fluticasone (FLONASE) 50 MCG/ACT nasal spray Spray 1 spray into both nostrils daily 16 g 3     montelukast (SINGULAIR) 10 MG tablet TAKE 1 TABLET BY MOUTH EVERYDAY AT BEDTIME 90 tablet 1     norethindrone (AYGESTIN) 5 MG tablet Take 1 tablet (5 mg) by mouth daily 90 tablet 0     QVAR REDIHALER 80 MCG/ACT inhaler Inhale 1 puff into the lungs 2 times daily 10.6 g 5     tacrolimus (PROTOPIC) 0.1 % external ointment Apply twice daily as needed. 30 g 4     traZODone (DESYREL) 50 MG tablet 1-2 at bedtime as needed for sleep. 180 tablet 1     VITAMIN D PO        SUMAtriptan (IMITREX) 50 MG tablet Take 1 tablet (50 mg) by mouth at onset of headache for migraine May repeat in 2 hours. Max 4 tablets/24 hours. (Patient not taking: Reported on 11/28/2022) 9 tablet 0     triamcinolone (KENALOG) 0.1 % external ointment Apply topically 2 times daily For 14 days - not use for 14 days then repeat as needed (Patient not  "taking: Reported on 12/5/2022) 80 g 1        Physical Exam   Blood pressure (!) 119/91, pulse 91, height 1.664 m (5' 5.51\"), weight 81 kg (178 lb 8 oz), last menstrual period 11/28/2022, SpO2 99 %, not currently breastfeeding.  Wt Readings from Last 4 Encounters:   12/12/22 81 kg (178 lb 8 oz)   11/01/22 78 kg (172 lb)   08/01/22 82.1 kg (181 lb)   06/24/22 80.9 kg (178 lb 6.4 oz)     Physical Exam  Constitutional:       General: She is not in acute distress.     Appearance: Normal appearance.   HENT:      Head: Normocephalic and atraumatic.      Nose: Nose normal.      Mouth/Throat:      Mouth: Mucous membranes are moist.      Pharynx: Oropharynx is clear.   Eyes:      Extraocular Movements: Extraocular movements intact.      Conjunctiva/sclera: Conjunctivae normal.   Cardiovascular:      Rate and Rhythm: Normal rate and regular rhythm.      Pulses: Normal pulses.      Heart sounds: Normal heart sounds.   Pulmonary:      Effort: Pulmonary effort is normal.      Breath sounds: Normal breath sounds.   Abdominal:      General: Abdomen is flat.      Tenderness: There is abdominal tenderness (Mild, left sided).   Musculoskeletal:         General: No swelling or tenderness. Normal range of motion.      Cervical back: Normal range of motion and neck supple.   Lymphadenopathy:      Cervical: No cervical adenopathy.   Skin:     General: Skin is warm and dry.      Findings: No rash.   Neurological:      Mental Status: She is alert and oriented to person, place, and time.      Motor: Weakness (4/5  strength and ankle dorsiflexion plantarflexion on the left.  Normal on the right.) present.      Coordination: Coordination normal.      Gait: Gait normal.   Psychiatric:         Mood and Affect: Mood normal.         Behavior: Behavior normal.         Joint Exam 12/12/2022     All documented joints were normal       Data    12/12/2022   ALFARO-28 (ESR) --   ALFARO-28 (CRP) 1.29 (Remission)   Tender (ALFARO-28) 0 / 28    Swollen (ALFARO-28) " 0 / 28    Provider Global 0 mm   Patient Global 0 mm   ESR --   CRP 1.5 mg/L     Results for orders placed or performed in visit on 12/12/22   Progesterone     Status: None   Result Value Ref Range    Progesterone 0.3 ng/mL   CBC with platelets and differential     Status: Abnormal   Result Value Ref Range    WBC Count 8.4 4.0 - 11.0 10e3/uL    RBC Count 3.95 3.80 - 5.20 10e6/uL    Hemoglobin 9.8 (L) 11.7 - 15.7 g/dL    Hematocrit 30.9 (L) 35.0 - 47.0 %    MCV 78 78 - 100 fL    MCH 24.8 (L) 26.5 - 33.0 pg    MCHC 31.7 31.5 - 36.5 g/dL    RDW 27.2 (H) 10.0 - 15.0 %    Platelet Count 519 (H) 150 - 450 10e3/uL    % Neutrophils 81 %    % Lymphocytes 10 %    % Monocytes 7 %    % Eosinophils 1 %    % Basophils 1 %    % Immature Granulocytes 0 %    NRBCs per 100 WBC 0 <1 /100    Absolute Neutrophils 6.8 1.6 - 8.3 10e3/uL    Absolute Lymphocytes 0.8 0.8 - 5.3 10e3/uL    Absolute Monocytes 0.6 0.0 - 1.3 10e3/uL    Absolute Eosinophils 0.1 0.0 - 0.7 10e3/uL    Absolute Basophils 0.0 0.0 - 0.2 10e3/uL    Absolute Immature Granulocytes 0.0 <=0.4 10e3/uL    Absolute NRBCs 0.0 10e3/uL   CBC with platelets and differential     Status: Abnormal    Narrative    The following orders were created for panel order CBC with platelets and differential.  Procedure                               Abnormality         Status                     ---------                               -----------         ------                     CBC with platelets and d...[[id number]]  Abnormal            Final result                 Please view results for these tests on the individual orders.       Recent Labs   Lab Test 11/22/22  1548 11/17/22  1500 11/01/22  1139   WBC 7.5  --  5.9   RBC 4.27  --  4.92   HGB 9.9* 9.6* 9.5*   HCT 33.7*  --  33.6*   MCV 79  --  68*   RDW 30.5*  --  24.8*   *  --  201

## 2022-12-12 NOTE — TELEPHONE ENCOUNTER
Pt confirmed that her surgery with Dr. Hernandez should be canceled.  I have also routed a message to the surgery schedulers to ask whey this is still visible in pt's encounters.  Pt is scheduled at [facility] on 1/26/23 for alternative tx recommendations.    Pt started the norethindrone on 11/28.  She states this was the first day of her period.  The first week her period was very light but it got heavier and she is still bleeding.

## 2022-12-12 NOTE — TELEPHONE ENCOUNTER
RN called [facility] lab at [phone] and had progesterone lab cancelled (will still appear in chart) and credited back to pt.    Jenny Ivan RN on 12/12/2022 at 3:29 PM

## 2022-12-12 NOTE — TELEPHONE ENCOUNTER
Yes, her surgery is canceled.  I am not sure why we can still see it in our system.  I believe Ottoniel is scheduled at [facility] this week.    She should continue to use the norethindrone daily.  If she continues to have spotting we can increase the dose.      The patient had labs done today.  They chelsea a progesterone level that had been ordered last June to be done on day 21.  She did not get that done, but the order was still active so it looks like they chelsea it with other labs.  Please see if we can get them to cancel the order and credit her because this lab is no longer relevant.

## 2022-12-13 ENCOUNTER — TELEPHONE (OUTPATIENT)
Dept: FAMILY MEDICINE | Facility: CLINIC | Age: 37
End: 2022-12-13

## 2022-12-13 LAB — DSDNA AB SER-ACNC: 6.9 IU/ML

## 2022-12-13 RX ORDER — NORETHINDRONE ACETATE 5 MG
5 TABLET ORAL 2 TIMES DAILY
Qty: 90 TABLET | Refills: 0 | Status: SHIPPED | OUTPATIENT
Start: 2022-12-13 | End: 2023-08-25

## 2022-12-13 NOTE — TELEPHONE ENCOUNTER
Pt states she is taking the Aygestin 5 mg tablet daily since it was prescribed on 11/28/22.  She has been experience bleeding still. Pt states her bleeding has been heavier the last couple of days.  Bleeding through a pad every 1 1/2 hours.    Routing to Dr. Hernandez to see if she would like to send in a new rx with an increased dose per note below.    Jane Velez RN

## 2022-12-13 NOTE — TELEPHONE ENCOUNTER
refaxed forms to [facility] as they did not receive them the first time around. Faxed on 12/13/22

## 2022-12-13 NOTE — TELEPHONE ENCOUNTER
I sent in a new prescription so she can take the aygestin 5 mg twice daily. Please let her know it should be effective in the next week or so.  If not, she should reach out and let me know

## 2022-12-13 NOTE — TELEPHONE ENCOUNTER
Forms/Letter Request    Type of form/letter: [facility]    Have you been seen for this request: N/A    Do we have the form/letter: Yes: placed in Perla Nieto PA-C forms in basket for review    When is form/letter needed by: ASAP    How would you like the form/letter returned: Fax    Flavia Abraham CMA

## 2022-12-13 NOTE — TELEPHONE ENCOUNTER
LIZAI verified with [facility] that surgery was cancelled back in November.  They stated it should drop off on our end after 01/03.

## 2022-12-14 NOTE — TELEPHONE ENCOUNTER
Both home care order forms sent to provider requested below. Sent interoffice to [facility] and faxed to [facility]k fax @ [phone]  Routed encounter to provider as well.      Flavia Abraham CMA

## 2022-12-15 ENCOUNTER — HOSPITAL ENCOUNTER (OUTPATIENT)
Dept: OCCUPATIONAL THERAPY | Facility: CLINIC | Age: 37
Setting detail: THERAPIES SERIES
Discharge: HOME | End: 2022-12-15
Attending: FAMILY MEDICINE
Payer: COMMERCIAL

## 2022-12-15 DIAGNOSIS — Z86.73 H/O ISCHEMIC RIGHT MCA STROKE: ICD-10-CM

## 2022-12-15 DIAGNOSIS — R29.898 WEAKNESS OF LEFT UPPER EXTREMITY: ICD-10-CM

## 2022-12-15 PROCEDURE — 97165 OT EVAL LOW COMPLEX 30 MIN: CPT | Mod: GO

## 2022-12-15 PROCEDURE — 97110 THERAPEUTIC EXERCISES: CPT | Mod: GO

## 2022-12-15 ASSESSMENT — ACTIVITIES OF DAILY LIVING (ADL)
HOME/FINANCIAL_MANAGEMENT: NO CONCERNS
IADL_QUICK_ADDS: MEAL PLANNING/PREPARATION;HOME/FINANCIAL/MANAGEMENT;COMMUNICATION/COMPUTER USE;COMMUNITY MOBILITY

## 2022-12-15 NOTE — PROGRESS NOTES
12/15/22 0900   Quick Adds   Type of Visit Initial Outpatient Occupational Therapy Evaluation   General Information   Start Of Care Date 12/15/22   Referring Physician Valentín Hung MD   Orders Evaluate and treat as indicated   Medical Diagnosis H/O ischemic right MCA stroke, Weakness of left upper extremity   Onset of Illness/Injury or Date of Surgery 10/24/22   Additional Occupational Profile Info/Pertinent History of Current Problem Pt is a 38 yo female referred to OT s/p R MCA stroke with LUE weakness. Pt had 5 weeks of home care OT and reported significant improvement with her L hand functioning, but continues to have issues with strength and coordination preventing her from being able to go back to work.   Role/Living Environment   Current Community Support Family/friend caregiver   Patient role/Employment history Employed  (bank , not currently working)   Current Living Environment House   Number of Stairs to Enter Home 1 ALLAN   Number of Stairs Within Home 3 levels   Primary Bathroom Location/Comments second level   Primary Bathroom Set Up/Equipment Tub/Shower combo   Home/Community Accessibility Comments no concerns with mobility   Prior Level - Transfers Independent   Prior Level - Ambulation Independent   Prior Level - ADLS Independent   Prior Responsibilities - IADL Meal Preparation;Housekeeping;Laundry;Shopping;Medication management;Finances;Driving;Work   Pain   Patient currently in pain Denies   Fall Risk Screen   Fall screen completed by OT   Have you fallen 2 or more times in the past year? No   Have you fallen and had an injury in the past year? No   Is patient a fall risk? No   Abuse Screen (yes response referral indicated)   Feels Unsafe at Home or Work/School no   Feels Threatened by Someone no   Does Anyone Try to Keep You From Having Contact with Others or Doing Things Outside Your Home? no   Physical Signs of Abuse Present no   Cognitive Status Examination    Orientation Orientation to person, place and time   Level of Consciousness Alert   Cognitive Comment pt noticing slight changes in focus and memory   Visual Perception   Visual Perception Comments no concerns   Sensation   Upper Extremity Sensory Examination No deficits were identified   Posture   Posture Not impaired   Range of Motion (ROM)   ROM Comments WFL   Hand Strength   Hand Dominance Right   Left Hand  (pounds) 22.6 pounds  (norm is 66.3, pt is over 3 SD below the mean)   Right Hand  (pounds) 59.3 pounds  (norm 74.1 lbs, pt between 1-2 SD below the mean)   Left Lateral Pinch (pounds) 8 pounds  (norm is 16, pt is 3 SD below the mean)   Right Lateral Pinch (pounds) 11 pounds  (norm 16.6, pt is between 1-2 SD below the mean)   Left Three Point Pinch (pounds) 4 pounds  (norm 17.1, pt is more than 3 SD below the mean)   Right Three Point Pinch (pounds) 10 pounds  (norm 17.5, pt is between 1-2 SD below the mean)   Coordination   Upper Extremity Coordination Left UE impaired;Right UE impaired   Left Hand, Nine Hole Peg Test (seconds) 47.98  (norm is 17.3, pt is more than 2 SD above the mean)   Right Hand, Nine Hole Peg Test (seconds) 19.64  (norm 16.4, pt is 2 SD above the mean)   Left Hand, Box and Blocks Test (cubes transferred in 1 minute) 39  (norm 83.5, pt is more than 3 SD below the mean)   Right Hand, Box and Blocks Test (cubes transferred in 1 minute) 57  (norm is 84.8, pt is more than 3 SD below the mean)   Mobility   Bed Mobility Comments indep   Transfer Skills   Transfer Comments indep   Toilet Transfer   Toilet Transfer Comments indep   Tub/Shower Transfer   Tub/Shower Transfer Comments indep   Bathing   Bathing Comments indep   Upper Body Dressing   Upper Body Dressing Comments indep   Lower Body Dressing   Lower Body Dressing Comments indep   Toileting   Toileting Comments indep   Grooming   Grooming Comments indep   Eating/Self-Feeding   Level of Thida: Eating independent    Instrumental Activities of Daily Living Assessment   IADL Quick Adds Meal Planning/Preparation;Home/Financial/Management;Communication/Computer Use;Community Mobility   Meal Planning/Preparation chopping is hard   Home/Financial Management no concerns   Communication/Computer Use unable to type, uses one hand on cell phone   Community Mobility currently driving   Planned Therapy Interventions   Planned Therapy Interventions ADL training;IADL training;Cognitive skills;Coordination training;Neuromuscular re-education;ROM;Self care/Home management;Strengthening;Therapeutic activities   Adult OT Eval Goals   OT Eval Goals (Adult) 1;2;3;4;5    OT Goal 1   Goal Identifier FM coordination   Goal Description Pt to demonstrate improved L UE FM coordination by completing the 9-Hole-Peg Test in 45 seconds or less for return to baseline function with her L hand for FM activities and ADLs task (manipulating buttons, zippers, etc.).   Target Date 03/15/23    OT Goal 2   Goal Identifier GM coordination   Goal Description Pt to improve L UE GM/FM coordination for increased independence and improved performance with ADL/IADL tasks (e.g. dressing, bathing, cooking, eating) by improving Box and Blocks Test score by at least 5 blocks.   Target Date 03/15/23    OT Goal 3   Goal Identifier AE   Goal Description Pt to verbalize understanding of and demonstrate use of 3-5 new pieces of adaptive equipment and/or adapted techniques to increase their independence and performance with ADLs and IADLS (e.g. kitchen- cutting, braces for typing).   Target Date 03/15/23   OT Goal 4   Goal Identifier    Goal Description Pt to demonstrate improved B  strength by at least 5 lbs for increased independence with I/ADLs such as opening containers, maintaining pinch to hold items, and perform dressing tasks.   Target Date 03/15/23   OT Goal 5   Goal Identifier Pinch   Goal Description Pt will demonstrate increased L pinch strength (both lateral  and palmar) by 3# each for increased independence with ADL/IADLs such as opening containers, pull up pants, maintaining pinch to hold items.   Target Date 03/15/23   Clinical Impression   Criteria for Skilled Therapeutic Interventions Met Yes, treatment indicated   OT Diagnosis LUE weakness affecting ADLs/IADLs   Assessment of Occupational Performance 3-5 Performance Deficits   Identified Performance Deficits impaired  and pinch strength, impaired coordination, decreased ROM in fingers   Clinical Decision Making (Complexity) Low complexity   Therapy Frequency 1x/every 1-2 weeks   Predicted Duration of Therapy Intervention (days/wks) 3 weeks, will reassess for more visits depending on progress   Risks and Benefits of Treatment have been explained. Yes   Patient, Family & other staff in agreement with plan of care Yes   Clinical Impression Comments Pt presents with significant impairment with LUE strength and coordination affecting her ADLs/IADLs. Pt would benefit from skilled treatment to improve hand/finger strength and dexterity so she can return back to work.   Total Evaluation Time   OT Eval, Low Complexity Minutes (18118) 25

## 2022-12-20 DIAGNOSIS — D64.9 LOW HEMOGLOBIN: ICD-10-CM

## 2022-12-20 RX ORDER — FERROUS SULFATE 325(65) MG
TABLET ORAL
Qty: 90 TABLET | Refills: 1 | Status: SHIPPED | OUTPATIENT
Start: 2022-12-20 | End: 2024-05-24

## 2023-01-05 ENCOUNTER — HOSPITAL ENCOUNTER (OUTPATIENT)
Dept: OCCUPATIONAL THERAPY | Facility: CLINIC | Age: 38
Setting detail: THERAPIES SERIES
Discharge: HOME | End: 2023-01-05
Attending: FAMILY MEDICINE
Payer: COMMERCIAL

## 2023-01-05 PROCEDURE — 97535 SELF CARE MNGMENT TRAINING: CPT | Mod: GO

## 2023-01-08 ENCOUNTER — HEALTH MAINTENANCE LETTER (OUTPATIENT)
Age: 38
End: 2023-01-08

## 2023-01-08 NOTE — PROGRESS NOTES
"    [facility]  [facility] [place] [address]  Phone: [phone], Fax: [phone]    Outpatient Visit Note:    Patient: Ottoniel Mccormack  MRN: [id number]  : 1985  ELOY: Isidro 10, 2023    Reason for Consultation:  Ottoniel Mccormack is referred for evaluation and treatment of ischemic stroke.    Assessment:  Ottoniel Mccormack is a 37 year old woman with a history of heavy menstrual bleeding from fibroids on tranexamic acid, recent ischemic stroke, referred to hematology for evaluation of underlying thrombophilia.    The cause of her stroke is unclear.  The main finding was a focal narrowing in a branch of her MCA which was labeled as a possible \"vasculopathy\" and less likely a thrombosis.  Cardiovascular and rheumatologic causes seem to have been ruled out.  It is possible that this was in fact a thrombosis - she had abnormal protein C testing and an abnormal lupus anticoagulant, and does not appear to have been on anticoagulation at that time.  I will repeat the testing today, and if anything is suggestive of an actual underlying thrombophilia I will make anticoagulation decisions based on that.  I do not think that her tranexamic acid was related to this episode, as tranexamic acid is generally not found to be associated with thromboembolic complications.  Finally, her father is from [place], and sickle cell disease can be associated with strokes of this nature.  I doubt this is the etiology, but it would be worth evaluating for, so we will send an electrophoresis today.    In terms of her iron deficiency, she should continue on oral iron, but change dosing to every other day.  I will repeat iron deficiency labs today to see her response thus far, and check again at follow-up in 2 months.  Her thrombocytosis is likely related to her iron deficiency.    Plan:  -Labs today: CBC, iron studies, ferritin, protein C, lupus anticoagulant, anticardiolipin antibodies, antibeta-2 " glycoprotein antibodies, hemoglobin electrophoresis  -Continue aspirin  -Continue oral iron, change dosing to 1 pill every other day  -Follow-up in 2 months    The patient is given our center's contact information and is instructed to call if she should have any further questions or concerns.    Marina Mcguire, nurse clinician, is assigned to provide patient care coordination and education.     Patient understands and agrees with the above plan and recommendation.    Jacob Cogan, MD  Hematology    45 minutes spent on the date of the encounter doing chart review, history and exam, documentation and further activities per the note    ----------------------------------------------------------------------------------------------------------------------  History of Present Illness:  Ottoniel Mccormack is a 37 year old woman with a history of asthma, eczema, allergies, and recent right MCA ischemic stroke (October 2022), possible autoimmune or connective tissue disorder, now referred to hematology for evaluation of antiphospholipid syndrome and need for long-term anticoagulation given measured low protein C level.    She presented to [facility] on October 24 with new onset left-sided weakness.  MRI showed an acute stroke with a shower of emboli in the right MCA distribution. She has a history of heavy menstrual bleeding, and was on tranexamic acid for 2 months total at the time of her stroke.  She was out of the window for tPA.  TTE and ELIZABETH were overall unremarkable, other than a tiny right to left shunt.  Zio patch results negative.    In terms of labs, her most recent CBC was notable for hemoglobin of 9.8 g/dL and a platelet count of 519,000/mcL.  She had a ferritin of 3 ng/mL at the time of her stroke admission.  Her INR and PTT were normal on admission.  She had a protein C level of 67.4.  Lupus anticoagulant testing was abnormal as well.  Prothrombin and factor V Leiden mutation testing was negative.   Antiphospholipid and antibeta-2 glycoprotein antibody testing was negative.    She has a family history of stroke in her grandfather in his 60s and her aunt in her 50s.  The patient herself is a non-smoker.    She recalls bilateral painful leg swelling after a flight to [place] in May.    Father is from [facility]    Maternal uncle had CVA at young age    Not amenable for myomectomy, referred to [facility] for further evaluation    On Norethindrone for heavy menses, no longer on TXA      Past Medical History:  No past medical history on file.    Past Surgical History:  Past Surgical History:   Procedure Laterality Date     LEEP TX, CERVICAL         Medications:  Current Outpatient Medications   Medication Sig Dispense Refill     albuterol (PROAIR HFA) 108 (90 Base) MCG/ACT inhaler INHALE 1 TO 2 PUFFS BY MOUTH EVERY 6 HOURS AS NEEDED FOR WHEEZING 18 g 4     aspirin (ASA) 81 MG chewable tablet Take 81 mg by mouth       augmented betamethasone dipropionate (DIPROLENE AF) 0.05 % external cream Apply twice daily as needed. 50 g 6     docusate sodium (COLACE) 100 MG capsule Take 1 capsule (100 mg) by mouth 2 times daily 30 capsule 1     dupilumab (DUPIXENT) 300 MG/2ML prefilled pen Inject 4 mLs (600 mg) Subcutaneous See Admin Instructions for 1 dose 4 mL 0     dupilumab (DUPIXENT) 300 MG/2ML prefilled pen Inject 2 mLs (300 mg) Subcutaneous every 14 days 4 mL 11     EPINEPHrine (ANY BX GENERIC EQUIV) 0.3 MG/0.3ML injection 2-pack Inject into the muscle as directed for anaphylaxis 2 each 1     escitalopram (LEXAPRO) 20 MG tablet Take 1 tablet (20 mg) by mouth daily 90 tablet 1     ferrous sulfate (FEROSUL) 325 (65 Fe) MG tablet TAKE 1 TABLET BY MOUTH EVERY DAY WITH BREAKFAST 90 tablet 1     fluticasone (FLONASE) 50 MCG/ACT nasal spray Spray 1 spray into both nostrils daily 16 g 3     montelukast (SINGULAIR) 10 MG tablet TAKE 1 TABLET BY MOUTH EVERYDAY AT BEDTIME 90 tablet 1     norethindrone (AYGESTIN) 5 MG tablet Take 1  tablet (5 mg) by mouth 2 times daily 90 tablet 0     QVAR REDIHALER 80 MCG/ACT inhaler Inhale 1 puff into the lungs 2 times daily 10.6 g 5     tacrolimus (PROTOPIC) 0.1 % external ointment Apply twice daily as needed. 30 g 4     traZODone (DESYREL) 50 MG tablet 1-2 at bedtime as needed for sleep. 180 tablet 1     SUMAtriptan (IMITREX) 50 MG tablet Take 1 tablet (50 mg) by mouth at onset of headache for migraine May repeat in 2 hours. Max 4 tablets/24 hours. (Patient not taking: Reported on 11/28/2022) 9 tablet 0     triamcinolone (KENALOG) 0.1 % external ointment Apply topically 2 times daily For 14 days - not use for 14 days then repeat as needed (Patient not taking: Reported on 12/5/2022) 80 g 1     VITAMIN D PO           Allergies:  Allergies   Allergen Reactions     Fish Anaphylaxis     Shellfish Allergy Hives       ROS:  Remainder of a comprehensive 14 point ROS is negative unless noted above.    Social History:  Denies any tobacco use. No significant alcohol use. Denies any illicit drug use.     Family History:  Father from [facility]; maternal uncle with stroke at young age    Objectives:  /85 (BP Location: Right arm, Patient Position: Sitting, Cuff Size: Adult Regular)   Pulse 72   Temp 98.1  F (36.7  C) (Oral)   Wt 81.9 kg (180 lb 9.6 oz)   LMP 11/28/2022 (Exact Date)   SpO2 99%   BMI 29.59 kg/m    Exam:   Constitutional: Appears well, no distress  HEENT: Pupils equal and reactive to light. No scleral icterus or hemorrhage. Nares without evidence of telangiectasia. Mucous membranes moist with no wet purpura. Dentition overall ok with no signs of decay. No pharyngeal exudates. No lymphadenopathy, no thyromeagaly  CV: regular rate and rhythm, no murmurs  Respiratory: clear  GI: abdomen soft, nontender, without guarding or rebound. No hepatomeagaly. No splenomegaly. Mus/Skele: no edema  Skin: no petechiae, no ecchymosis.  Neuro: CN II-XII intact. Normal gait. AOx3  Heme/Lymph: no supraclavicular,  axillary or umbilical adenopathy.     Labs:  WBC Count   Date Value Ref Range Status   12/12/2022 8.4 4.0 - 11.0 10e3/uL Final   11/22/2022 7.5 4.0 - 11.0 10e3/uL Final   11/01/2022 5.9 4.0 - 11.0 10e3/uL Final     Hemoglobin   Date Value Ref Range Status   12/12/2022 9.8 (L) 11.7 - 15.7 g/dL Final   11/22/2022 9.9 (L) 11.7 - 15.7 g/dL Final   11/17/2022 9.6 (L) 11.7 - 15.7 g/dL Final   11/01/2022 9.5 (L) 11.7 - 15.7 g/dL Final     Hematocrit   Date Value Ref Range Status   12/12/2022 30.9 (L) 35.0 - 47.0 % Final   11/22/2022 33.7 (L) 35.0 - 47.0 % Final   11/01/2022 33.6 (L) 35.0 - 47.0 % Final     Platelet Count   Date Value Ref Range Status   12/12/2022 519 (H) 150 - 450 10e3/uL Final   11/22/2022 859 (H) 150 - 450 10e3/uL Final   11/01/2022 201 150 - 450 10e3/uL Final         Imaging:  MR Pelvis (GYN) wo & w Contrast  Narrative: EXAMINATION: MR PELVIS (GYN) W/O & W CONTRAST, 11/10/2022 3:22  PM    COMPARISON: US Pelvis 7/15/2022     HISTORY: Submucous and subserous leiomyoma of uterus; Submucous and  subserous leiomyoma of uterus    TECHNIQUE: Multiplanar, multisequence imaging was obtained of the  pelvis without and with intravenous contrast. Contrast dose: gadavist  8ml    FINDINGS:   The uterus is anteverted and enlarged, measures 14.1 x 8.5 x 13.1 cm.  There are multiple subserosal, submucosal and intramural T2  hypointense fibroids ranging between 1.8 cm to 6.5 cm.  The 1.8 cm  fibroid at the fundus has more than 50% of its circumference within  the endometrial cavity. Multiple other intramural fibroids with less  than 50% of their circumference projecting into the endometrial  cavity. The endometrial lining is distorted due to mass effect,  measures up to 1.2 cm at the corpus (6/27). Junctional zone partially  visualized, no abnormal signal or thickening identified within  visualized portion. No abnormal diffusion signal.  Cervix and vagina are unremarkable.    There is trace amount free fluid in the  pelvis.    Bilateral ovaries are unremarkable with follicles. There is no adnexal  mass.     No significant lymphadenopathy.    Urinary bladder is within normal limits.    Visualized bowel segments are nonobstructed. Umbilical hernia  containing nonobstructed small bowel loop.    No aggressive or acute osseous structure.  Impression: IMPRESSION: Uterine myomatosis including multiple subserosal,  submucosal and intramural T2 hypointense fibroids ranging between 1.8  cm to 6.5 cm.    I have personally reviewed the examination and initial interpretation  and I agree with the findings.    SHWETHA MCCORMICK MD         SYSTEM ID:  [id number]

## 2023-01-10 ENCOUNTER — OFFICE VISIT (OUTPATIENT)
Dept: HEMATOLOGY | Facility: CLINIC | Age: 38
End: 2023-01-10
Attending: STUDENT IN AN ORGANIZED HEALTH CARE EDUCATION/TRAINING PROGRAM
Payer: COMMERCIAL

## 2023-01-10 VITALS
TEMPERATURE: 98.1 F | DIASTOLIC BLOOD PRESSURE: 85 MMHG | SYSTOLIC BLOOD PRESSURE: 129 MMHG | WEIGHT: 180.6 LBS | HEART RATE: 72 BPM | OXYGEN SATURATION: 99 % | BODY MASS INDEX: 29.59 KG/M2

## 2023-01-10 DIAGNOSIS — I63.9 ISCHEMIC STROKE (H): ICD-10-CM

## 2023-01-10 DIAGNOSIS — D68.59 LOW PROTEIN C ACTIVITY LEVEL (H): ICD-10-CM

## 2023-01-10 LAB
DRVVT SCREEN RATIO: 1
ERYTHROCYTE [DISTWIDTH] IN BLOOD BY AUTOMATED COUNT: 18.6 % (ref 10–15)
FERRITIN SERPL-MCNC: 10 NG/ML (ref 12–150)
HCT VFR BLD AUTO: 35 % (ref 35–47)
HGB BLD-MCNC: 10.7 G/DL (ref 11.7–15.7)
INR PPP: 0.98 (ref 0.85–1.15)
IRON SATN MFR SERPL: 75 % (ref 15–46)
IRON SERPL-MCNC: 278 UG/DL (ref 35–180)
LA PPP-IMP: NEGATIVE
LUPUS INTERPRETATION: NORMAL
MCH RBC QN AUTO: 25.6 PG (ref 26.5–33)
MCHC RBC AUTO-ENTMCNC: 30.6 G/DL (ref 31.5–36.5)
MCV RBC AUTO: 84 FL (ref 78–100)
PLATELET # BLD AUTO: 213 10E3/UL (ref 150–450)
PTT RATIO: 1.03
RBC # BLD AUTO: 4.18 10E6/UL (ref 3.8–5.2)
THROMBIN TIME: 16.3 SECONDS (ref 13–19)
TIBC SERPL-MCNC: 369 UG/DL (ref 240–430)
WBC # BLD AUTO: 4.5 10E3/UL (ref 4–11)

## 2023-01-10 PROCEDURE — 82728 ASSAY OF FERRITIN: CPT | Performed by: STUDENT IN AN ORGANIZED HEALTH CARE EDUCATION/TRAINING PROGRAM

## 2023-01-10 PROCEDURE — 85660 RBC SICKLE CELL TEST: CPT | Performed by: STUDENT IN AN ORGANIZED HEALTH CARE EDUCATION/TRAINING PROGRAM

## 2023-01-10 PROCEDURE — 83550 IRON BINDING TEST: CPT | Performed by: STUDENT IN AN ORGANIZED HEALTH CARE EDUCATION/TRAINING PROGRAM

## 2023-01-10 PROCEDURE — 86147 CARDIOLIPIN ANTIBODY EA IG: CPT | Performed by: STUDENT IN AN ORGANIZED HEALTH CARE EDUCATION/TRAINING PROGRAM

## 2023-01-10 PROCEDURE — 85303 CLOT INHIBIT PROT C ACTIVITY: CPT | Performed by: STUDENT IN AN ORGANIZED HEALTH CARE EDUCATION/TRAINING PROGRAM

## 2023-01-10 PROCEDURE — 86146 BETA-2 GLYCOPROTEIN ANTIBODY: CPT | Performed by: STUDENT IN AN ORGANIZED HEALTH CARE EDUCATION/TRAINING PROGRAM

## 2023-01-10 PROCEDURE — 85390 FIBRINOLYSINS SCREEN I&R: CPT | Mod: 26 | Performed by: PATHOLOGY

## 2023-01-10 PROCEDURE — G0463 HOSPITAL OUTPT CLINIC VISIT: HCPCS | Performed by: STUDENT IN AN ORGANIZED HEALTH CARE EDUCATION/TRAINING PROGRAM

## 2023-01-10 PROCEDURE — 84999 UNLISTED CHEMISTRY PROCEDURE: CPT | Performed by: STUDENT IN AN ORGANIZED HEALTH CARE EDUCATION/TRAINING PROGRAM

## 2023-01-10 PROCEDURE — 85027 COMPLETE CBC AUTOMATED: CPT | Performed by: STUDENT IN AN ORGANIZED HEALTH CARE EDUCATION/TRAINING PROGRAM

## 2023-01-10 PROCEDURE — 85730 THROMBOPLASTIN TIME PARTIAL: CPT | Performed by: STUDENT IN AN ORGANIZED HEALTH CARE EDUCATION/TRAINING PROGRAM

## 2023-01-10 PROCEDURE — 36415 COLL VENOUS BLD VENIPUNCTURE: CPT | Performed by: STUDENT IN AN ORGANIZED HEALTH CARE EDUCATION/TRAINING PROGRAM

## 2023-01-10 PROCEDURE — 99204 OFFICE O/P NEW MOD 45 MIN: CPT | Performed by: STUDENT IN AN ORGANIZED HEALTH CARE EDUCATION/TRAINING PROGRAM

## 2023-01-11 ENCOUNTER — MYC MEDICAL ADVICE (OUTPATIENT)
Dept: HEMATOLOGY | Facility: CLINIC | Age: 38
End: 2023-01-11
Payer: COMMERCIAL

## 2023-01-11 DIAGNOSIS — D50.0 IRON DEFICIENCY ANEMIA DUE TO CHRONIC BLOOD LOSS: Primary | ICD-10-CM

## 2023-01-11 DIAGNOSIS — D68.59 LOW PROTEIN C ACTIVITY LEVEL (H): Primary | ICD-10-CM

## 2023-01-11 DIAGNOSIS — D68.59 LOW PROTEIN C ACTIVITY LEVEL (H): ICD-10-CM

## 2023-01-11 LAB — PROT C ACT/NOR PPP CHRO: 67 % (ref 70–170)

## 2023-01-11 RX ORDER — EPINEPHRINE 1 MG/ML
0.3 INJECTION, SOLUTION, CONCENTRATE INTRAVENOUS EVERY 5 MIN PRN
Status: CANCELLED | OUTPATIENT
Start: 2023-01-16

## 2023-01-11 RX ORDER — ALBUTEROL SULFATE 90 UG/1
1-2 AEROSOL, METERED RESPIRATORY (INHALATION)
Status: CANCELLED
Start: 2023-01-16

## 2023-01-11 RX ORDER — HEPARIN SODIUM (PORCINE) LOCK FLUSH IV SOLN 100 UNIT/ML 100 UNIT/ML
5 SOLUTION INTRAVENOUS
Status: CANCELLED | OUTPATIENT
Start: 2023-01-16

## 2023-01-11 RX ORDER — ALBUTEROL SULFATE 0.83 MG/ML
2.5 SOLUTION RESPIRATORY (INHALATION)
Status: CANCELLED | OUTPATIENT
Start: 2023-01-16

## 2023-01-11 RX ORDER — DIPHENHYDRAMINE HYDROCHLORIDE 50 MG/ML
50 INJECTION INTRAMUSCULAR; INTRAVENOUS
Status: CANCELLED
Start: 2023-01-16

## 2023-01-11 RX ORDER — HEPARIN SODIUM,PORCINE 10 UNIT/ML
5 VIAL (ML) INTRAVENOUS
Status: CANCELLED | OUTPATIENT
Start: 2023-01-16

## 2023-01-11 RX ORDER — MEPERIDINE HYDROCHLORIDE 25 MG/ML
25 INJECTION INTRAMUSCULAR; INTRAVENOUS; SUBCUTANEOUS EVERY 30 MIN PRN
Status: CANCELLED | OUTPATIENT
Start: 2023-01-16

## 2023-01-12 LAB
HGB A1 MFR BLD: 97.2 %
HGB A2 MFR BLD: 2.6 %
HGB C MFR BLD: 0 %
HGB E MFR BLD: 0 %
HGB F MFR BLD: 0.2 %
HGB FRACT BLD ELPH-IMP: NORMAL
HGB OTHER MFR BLD: 0 %
HGB S BLD QL SOLY: NORMAL
HGB S MFR BLD: 0 %
Lab: NORMAL
PATH INTERP BLD-IMP: NORMAL
PERFORMING LABORATORY: NORMAL
TEST NAME: NORMAL

## 2023-01-14 LAB
B2 GLYCOPROT1 IGG SERPL IA-ACNC: 4.3 U/ML
B2 GLYCOPROT1 IGM SERPL IA-ACNC: <2.4 U/ML
CARDIOLIPIN IGG SER IA-ACNC: 9.4 GPL-U/ML
CARDIOLIPIN IGG SER IA-ACNC: NEGATIVE
CARDIOLIPIN IGM SER IA-ACNC: 7 MPL-U/ML
CARDIOLIPIN IGM SER IA-ACNC: NEGATIVE
MISCELLANEOUS TEST 1 (ARUP): NORMAL

## 2023-01-18 DIAGNOSIS — I63.9 ISCHEMIC STROKE (H): Primary | ICD-10-CM

## 2023-01-24 ENCOUNTER — MYC MEDICAL ADVICE (OUTPATIENT)
Dept: OBGYN | Facility: CLINIC | Age: 38
End: 2023-01-24
Payer: COMMERCIAL

## 2023-01-27 ENCOUNTER — LAB (OUTPATIENT)
Dept: LAB | Facility: CLINIC | Age: 38
End: 2023-01-27
Payer: COMMERCIAL

## 2023-01-27 ENCOUNTER — MYC MEDICAL ADVICE (OUTPATIENT)
Dept: FAMILY MEDICINE | Facility: CLINIC | Age: 38
End: 2023-01-27

## 2023-01-27 DIAGNOSIS — D25.1 INTRAMURAL, SUBMUCOUS, AND SUBSEROUS LEIOMYOMA OF UTERUS: ICD-10-CM

## 2023-01-27 DIAGNOSIS — D25.2 INTRAMURAL, SUBMUCOUS, AND SUBSEROUS LEIOMYOMA OF UTERUS: ICD-10-CM

## 2023-01-27 DIAGNOSIS — D25.0 INTRAMURAL, SUBMUCOUS, AND SUBSEROUS LEIOMYOMA OF UTERUS: ICD-10-CM

## 2023-01-27 DIAGNOSIS — I63.9 ISCHEMIC STROKE (H): ICD-10-CM

## 2023-01-27 LAB
INTERPRETATION: NORMAL
SIGNIFICANT RESULTS: NORMAL
SPECIMEN DESCRIPTION: NORMAL
TEST DETAILS, MDL: NORMAL

## 2023-01-27 PROCEDURE — 36415 COLL VENOUS BLD VENIPUNCTURE: CPT | Performed by: STUDENT IN AN ORGANIZED HEALTH CARE EDUCATION/TRAINING PROGRAM

## 2023-01-27 PROCEDURE — 88185 FLOWCYTOMETRY/TC ADD-ON: CPT | Performed by: STUDENT IN AN ORGANIZED HEALTH CARE EDUCATION/TRAINING PROGRAM

## 2023-01-27 PROCEDURE — 81219 CALR GENE COM VARIANTS: CPT

## 2023-01-27 PROCEDURE — 81403 MOPATH PROCEDURE LEVEL 4: CPT

## 2023-01-27 PROCEDURE — 88187 FLOWCYTOMETRY/READ 2-8: CPT | Performed by: STUDENT IN AN ORGANIZED HEALTH CARE EDUCATION/TRAINING PROGRAM

## 2023-01-27 PROCEDURE — G0452 MOLECULAR PATHOLOGY INTERPR: HCPCS | Mod: XU | Performed by: STUDENT IN AN ORGANIZED HEALTH CARE EDUCATION/TRAINING PROGRAM

## 2023-01-27 PROCEDURE — 81270 JAK2 GENE: CPT

## 2023-01-30 ENCOUNTER — VIRTUAL VISIT (OUTPATIENT)
Dept: FAMILY MEDICINE | Facility: CLINIC | Age: 38
End: 2023-01-30
Payer: COMMERCIAL

## 2023-01-30 DIAGNOSIS — D25.0 INTRAMURAL, SUBMUCOUS, AND SUBSEROUS LEIOMYOMA OF UTERUS: ICD-10-CM

## 2023-01-30 DIAGNOSIS — D25.1 INTRAMURAL, SUBMUCOUS, AND SUBSEROUS LEIOMYOMA OF UTERUS: ICD-10-CM

## 2023-01-30 DIAGNOSIS — D25.2 INTRAMURAL, SUBMUCOUS, AND SUBSEROUS LEIOMYOMA OF UTERUS: ICD-10-CM

## 2023-01-30 DIAGNOSIS — R29.898 WEAKNESS OF LEFT UPPER EXTREMITY: Primary | ICD-10-CM

## 2023-01-30 DIAGNOSIS — F33.40 RECURRENT MAJOR DEPRESSIVE DISORDER, IN REMISSION (H): ICD-10-CM

## 2023-01-30 LAB
PATH REPORT.COMMENTS IMP SPEC: NORMAL
PATH REPORT.FINAL DX SPEC: NORMAL
PATH REPORT.MICROSCOPIC SPEC OTHER STN: NORMAL
PATH REPORT.RELEVANT HX SPEC: NORMAL

## 2023-01-30 PROCEDURE — 99213 OFFICE O/P EST LOW 20 MIN: CPT | Mod: TEL | Performed by: FAMILY MEDICINE

## 2023-01-30 NOTE — PROGRESS NOTES
Ottoniel is a 37 year old who is being evaluated via a billable telephone visit.      What phone number would you like to be contacted at? [phone]  How would you like to obtain your AVS? Gilberto    Distant Location (provider location):  On-site    Assessment & Plan     Weakness of left upper extremity  -Secondary to right MCA stroke.  -Weakness is improving but still present.  Ongoing OT.  -Ottoniel preferred letter to her job stating that she can work from home due to her left extremity weakness for the next 6 months.  -Work help her set up a 'voice program' to help with typing.    Recurrent major depressive disorder, in remission (H)  -Stable on Lexapro.    Intramural, submucous, and subserous leiomyoma of uterus  -Following with [facility].  -Due to her recent stroke and patent foramina ovale, pending recommendation from cardiology neurology colleagues to consider PFO closure prior to surgery.               No follow-ups on file.    Valentín Montana MD  [facility]    Sheri Alcazar is a 37 year old, presenting for the following health issues:  Follow Up (Stroke/)      History of Present Illness       Reason for visit:  Stroke    She eats 2-3 servings of fruits and vegetables daily.She consumes 1 sweetened beverage(s) daily.She exercises with enough effort to increase her heart rate 30 to 60 minutes per day.  She exercises with enough effort to increase her heart rate 3 or less days per week.   She is taking medications regularly.       Cerebrovascular Follow-up      Patient history: ischemic cerebrovascular incident    Residual symptoms: None and Weakness in the arm on the left    Worsened or new symptoms since last visit: No    Daily aspirin use: Yes    Hypertension controlled: Yes        Review of Systems   Constitutional, HEENT, cardiovascular, pulmonary, gi and gu systems are negative, except as otherwise noted.      Objective           Vitals:  No vitals  were obtained today due to virtual visit.    Physical Exam   healthy, alert and no distress  PSYCH: Alert and oriented times 3; coherent speech, normal   rate and volume, able to articulate logical thoughts, able   to abstract reason, no tangential thoughts, no hallucinations   or delusions  Her affect is normal  RESP: No cough, no audible wheezing, able to talk in full sentences  Remainder of exam unable to be completed due to telephone visits                Phone call duration: 15 minutes

## 2023-01-30 NOTE — PROGRESS NOTES
"Ottoniel is a 37 year old who is being evaluated via a billable video visit.      How would you like to obtain your AVS? MyChart  If the video visit is dropped, the invitation should be resent by: Text to cell phone: [phone]  Will anyone else be joining your video visit? No  {If patient encounters technical issues they should call [phone] :037280}        {PROVIDER CHARTING PREFERENCE:824587}    Subjective   Ottoniel is a 37 year old{ACCOMPANIED BY STATEMENT (Optional):851214}, presenting for the following health issues:  Follow Up (Stroke/)      HPI     Cerebrovascular Follow-up      Patient history: { :502469}    Residual symptoms: { :415900::\"None\"}    Worsened or new symptoms since last visit: {UMP FM No Yes (Red):732693::\"No\"}    Daily aspirin use: {Yes - no RED:300719::\"Yes\"}    Hypertension controlled: { :760617::\"Yes\"}      How many servings of fruits and vegetables do you eat daily?  2-3    On average, how many sweetened beverages do you drink each day (Examples: soda, juice, sweet tea, etc.  Do NOT count diet or artificially sweetened beverages)?   1    How many days per week do you exercise enough to make your heart beat faster? 3 or less    How many minutes a day do you exercise enough to make your heart beat faster? 30 - 60    How many days per week do you miss taking your medication? 0    {additonal problems for provider to add (Optional):674946}    Review of Systems   {ROS COMP (Optional):409620}      Objective           Vitals:  No vitals were obtained today due to virtual visit.    Physical Exam   {video visit exam brief selected:197931::\"GENERAL: Healthy, alert and no distress\",\"EYES: Eyes grossly normal to inspection.  No discharge or erythema, or obvious scleral/conjunctival abnormalities.\",\"RESP: No audible wheeze, cough, or visible cyanosis.  No visible retractions or increased work of breathing.  \",\"SKIN: Visible skin clear. No significant rash, abnormal pigmentation or " "lesions.\",\"NEURO: Cranial nerves grossly intact.  Mentation and speech appropriate for age.\",\"PSYCH: Mentation appears normal, affect normal/bright, judgement and insight intact, normal speech and appearance well-groomed.\"}    {Diagnostic Test Results (Optional):118448}    {AMBULATORY ATTESTATION (Optional):509974}        Video-Visit Details    Type of service:  Video Visit     Originating Location (pt. Location): {video visit patient location:053069::\"Home\"}  {PROVIDER LOCATION On-site should be selected for visits conducted from your clinic location or adjoining [facility] hospital, academic office, or other nearby [facility] building. Off-site should be selected for all other provider locations, including home:795219}  Distant Location (provider location):  {virtual location provider:487903}  Platform used for Video Visit: {Virtual Visit Platforms:806428::\"[employer]\"}    "

## 2023-01-30 NOTE — LETTER
To whom it may concern:    Ottoniel Mccormack has been having weakness in her left upper extremity and will benefit with 'working from home' for the next 6 months.  She does have  Voice program set up to help with her work.  Working from home with voice program will limit distractions to other coworkers as well.    Feel free to reach out to me if you have any questions.    Sincerely,  Valentín Sales MD

## 2023-02-02 ENCOUNTER — HOSPITAL ENCOUNTER (OUTPATIENT)
Dept: OCCUPATIONAL THERAPY | Facility: CLINIC | Age: 38
Setting detail: THERAPIES SERIES
Discharge: HOME | End: 2023-02-02
Attending: FAMILY MEDICINE
Payer: COMMERCIAL

## 2023-02-02 PROCEDURE — 97110 THERAPEUTIC EXERCISES: CPT | Mod: GO

## 2023-02-06 ENCOUNTER — TELEPHONE (OUTPATIENT)
Dept: FAMILY MEDICINE | Facility: CLINIC | Age: 38
End: 2023-02-06

## 2023-02-07 NOTE — TELEPHONE ENCOUNTER
Printed today's letter from Dr Babcock and faxed to Silvia Diaz, [phone], right fax confirmed at 1:03 pm today, 2/7/2023. Placed in writer's copy basket.  Meron Ho [facility]  2nd Floor  Primary Care

## 2023-02-10 ENCOUNTER — INFUSION THERAPY VISIT (OUTPATIENT)
Dept: INFUSION THERAPY | Facility: CLINIC | Age: 38
End: 2023-02-10
Payer: COMMERCIAL

## 2023-02-10 VITALS
RESPIRATION RATE: 16 BRPM | TEMPERATURE: 98.3 F | DIASTOLIC BLOOD PRESSURE: 77 MMHG | HEART RATE: 82 BPM | SYSTOLIC BLOOD PRESSURE: 121 MMHG | OXYGEN SATURATION: 99 % | BODY MASS INDEX: 29.32 KG/M2 | WEIGHT: 179 LBS

## 2023-02-10 DIAGNOSIS — D50.0 IRON DEFICIENCY ANEMIA DUE TO CHRONIC BLOOD LOSS: Primary | ICD-10-CM

## 2023-02-10 PROCEDURE — 96366 THER/PROPH/DIAG IV INF ADDON: CPT | Performed by: INTERNAL MEDICINE

## 2023-02-10 PROCEDURE — 99207 PR NO CHARGE LOS: CPT | Performed by: INTERNAL MEDICINE

## 2023-02-10 PROCEDURE — 96365 THER/PROPH/DIAG IV INF INIT: CPT | Performed by: INTERNAL MEDICINE

## 2023-02-10 RX ORDER — ALBUTEROL SULFATE 90 UG/1
1-2 AEROSOL, METERED RESPIRATORY (INHALATION)
Status: CANCELLED
Start: 2023-02-12

## 2023-02-10 RX ORDER — EPINEPHRINE 1 MG/ML
0.3 INJECTION, SOLUTION INTRAMUSCULAR; SUBCUTANEOUS EVERY 5 MIN PRN
Status: CANCELLED | OUTPATIENT
Start: 2023-02-12

## 2023-02-10 RX ORDER — DIPHENHYDRAMINE HYDROCHLORIDE 50 MG/ML
50 INJECTION INTRAMUSCULAR; INTRAVENOUS
Status: CANCELLED
Start: 2023-02-12

## 2023-02-10 RX ORDER — MEPERIDINE HYDROCHLORIDE 25 MG/ML
25 INJECTION INTRAMUSCULAR; INTRAVENOUS; SUBCUTANEOUS EVERY 30 MIN PRN
Status: CANCELLED | OUTPATIENT
Start: 2023-02-12

## 2023-02-10 RX ORDER — HEPARIN SODIUM (PORCINE) LOCK FLUSH IV SOLN 100 UNIT/ML 100 UNIT/ML
5 SOLUTION INTRAVENOUS
Status: CANCELLED | OUTPATIENT
Start: 2023-02-12

## 2023-02-10 RX ORDER — METHYLPREDNISOLONE SODIUM SUCCINATE 125 MG/2ML
125 INJECTION, POWDER, LYOPHILIZED, FOR SOLUTION INTRAMUSCULAR; INTRAVENOUS
Status: CANCELLED
Start: 2023-02-12

## 2023-02-10 RX ORDER — ALBUTEROL SULFATE 0.83 MG/ML
2.5 SOLUTION RESPIRATORY (INHALATION)
Status: CANCELLED | OUTPATIENT
Start: 2023-02-12

## 2023-02-10 RX ORDER — HEPARIN SODIUM,PORCINE 10 UNIT/ML
5 VIAL (ML) INTRAVENOUS
Status: CANCELLED | OUTPATIENT
Start: 2023-02-12

## 2023-02-10 RX ADMIN — Medication 250 ML: at 13:44

## 2023-02-10 ASSESSMENT — PAIN SCALES - GENERAL: PAINLEVEL: NO PAIN (0)

## 2023-02-10 NOTE — PROGRESS NOTES
Infusion Nursing Note:  Ottoniel Mccormack presents today for Venofer 1/3 - NEW.    Patient seen by provider today: No   present during visit today: Not Applicable.    Note: Introduced to the infusion center. Patient information on Venofer given to patient. Medication explained and patient had no further questions. Tolerated first Venofer infusion well.     Intravenous Access:  Peripheral IV placed.    Treatment Conditions:  Not Applicable.    Post Infusion Assessment:  Patient tolerated infusion without incident.  Site patent and intact, free from redness, edema or discomfort.  No evidence of extravasations.  Access discontinued per protocol.     Discharge Plan:   Discharge instructions reviewed with: Patient.  Patient and/or family verbalized understanding of discharge instructions and all questions answered.  Patient discharged in stable condition accompanied by: self.  Departure Mode: Ambulatory.      Dayana Coyle RN                     Flextip/19 Gauge/Easily/3-5 cm

## 2023-02-16 ENCOUNTER — HOSPITAL ENCOUNTER (OUTPATIENT)
Dept: OCCUPATIONAL THERAPY | Facility: CLINIC | Age: 38
Setting detail: THERAPIES SERIES
Discharge: HOME | End: 2023-02-16
Attending: FAMILY MEDICINE
Payer: COMMERCIAL

## 2023-02-16 PROCEDURE — 97535 SELF CARE MNGMENT TRAINING: CPT | Mod: GO

## 2023-02-16 NOTE — PROGRESS NOTES
[facility]    Outpatient Occupational Therapy Discharge Note  Patient: Ottoniel Mccormack  : 1985    Beginning/End Dates of Reporting Period:  12/15/22 to 23    Referring Provider: Dr Valentín Montana    Therapy Diagnosis: LUE weakness affecting ADLs/IADLs      Objective Measurements:      Objective Measure: L lateral pinch   Details: 12 lbs   Objective Measure: L 3 pt pinch   Details: 10 lbs      Goals:     Goal Identifier FM coordination   Goal Description Pt to demonstrate improved L UE FM coordination by completing the 9-Hole-Peg Test in 45 seconds or less for return to baseline function with her L hand for FM activities and ADLs task (manipulating buttons, zippers, etc.).   Target Date 03/15/23   Date Met  23   Progress (detail required for progress note):       Goal Identifier GM coordination   Goal Description Pt to improve L UE GM/FM coordination for increased independence and improved performance with ADL/IADL tasks (e.g. dressing, bathing, cooking, eating) by improving Box and Blocks Test score by at least 5 blocks.   Target Date 03/15/23   Date Met  23   Progress (detail required for progress note):       Goal Identifier AE   Goal Description Pt to verbalize understanding of and demonstrate use of 3-5 new pieces of adaptive equipment and/or adapted techniques to increase their independence and performance with ADLs and IADLS (e.g. kitchen- cutting, braces for typing).   Target Date 03/15/23   Date Met  23   Progress (detail required for progress note): Pt using thumb spica support brace which has improved typing and FM motor control, voice to text for computer work, no other AE needs. Goal met     Goal Identifier    Goal Description Pt to demonstrate improved B  strength by at least 5 lbs for increased independence with I/ADLs such as opening  containers, maintaining pinch to hold items, and perform dressing tasks.   Target Date 03/15/23   Date Met  02/02/23   Progress (detail required for progress note):       Goal Identifier Pinch   Goal Description Pt will demonstrate increased L pinch strength (both lateral and palmar) by 3# each for increased independence with ADL/IADLs such as opening containers, pull up pants, maintaining pinch to hold items.   Target Date 03/15/23   Date Met  02/16/23   Progress (detail required for progress note): Lateral pinch increased 4 lbs, 3 pt pinch increased 6 lbs, goal met     Plan:  Discharge from therapy.    Discharge:    Reason for Discharge: Patient has met all goals.    Discharge Plan: Patient to continue home program.

## 2023-02-17 DIAGNOSIS — F41.9 ANXIETY: ICD-10-CM

## 2023-02-17 DIAGNOSIS — F32.1 MODERATE MAJOR DEPRESSION (H): ICD-10-CM

## 2023-02-17 DIAGNOSIS — F43.10 PTSD (POST-TRAUMATIC STRESS DISORDER): ICD-10-CM

## 2023-02-17 RX ORDER — ESCITALOPRAM OXALATE 20 MG/1
TABLET ORAL
Qty: 90 TABLET | Refills: 1 | Status: SHIPPED | OUTPATIENT
Start: 2023-02-17 | End: 2024-05-24

## 2023-02-28 ENCOUNTER — INFUSION THERAPY VISIT (OUTPATIENT)
Dept: INFUSION THERAPY | Facility: CLINIC | Age: 38
End: 2023-02-28
Payer: COMMERCIAL

## 2023-02-28 VITALS
HEART RATE: 84 BPM | SYSTOLIC BLOOD PRESSURE: 122 MMHG | DIASTOLIC BLOOD PRESSURE: 73 MMHG | WEIGHT: 179.9 LBS | TEMPERATURE: 100 F | OXYGEN SATURATION: 100 % | BODY MASS INDEX: 29.47 KG/M2

## 2023-02-28 DIAGNOSIS — D50.0 IRON DEFICIENCY ANEMIA DUE TO CHRONIC BLOOD LOSS: Primary | ICD-10-CM

## 2023-02-28 PROCEDURE — 96365 THER/PROPH/DIAG IV INF INIT: CPT | Performed by: NURSE PRACTITIONER

## 2023-02-28 PROCEDURE — 99207 PR NO CHARGE LOS: CPT

## 2023-02-28 PROCEDURE — 96366 THER/PROPH/DIAG IV INF ADDON: CPT | Performed by: NURSE PRACTITIONER

## 2023-02-28 RX ORDER — ALBUTEROL SULFATE 0.83 MG/ML
2.5 SOLUTION RESPIRATORY (INHALATION)
Status: CANCELLED | OUTPATIENT
Start: 2023-03-02

## 2023-02-28 RX ORDER — ALBUTEROL SULFATE 90 UG/1
1-2 AEROSOL, METERED RESPIRATORY (INHALATION)
Status: CANCELLED
Start: 2023-03-02

## 2023-02-28 RX ORDER — EPINEPHRINE 1 MG/ML
0.3 INJECTION, SOLUTION INTRAMUSCULAR; SUBCUTANEOUS EVERY 5 MIN PRN
Status: CANCELLED | OUTPATIENT
Start: 2023-03-02

## 2023-02-28 RX ORDER — HEPARIN SODIUM,PORCINE 10 UNIT/ML
5 VIAL (ML) INTRAVENOUS
Status: CANCELLED | OUTPATIENT
Start: 2023-03-02

## 2023-02-28 RX ORDER — METHYLPREDNISOLONE SODIUM SUCCINATE 125 MG/2ML
125 INJECTION, POWDER, LYOPHILIZED, FOR SOLUTION INTRAMUSCULAR; INTRAVENOUS
Status: CANCELLED
Start: 2023-03-02

## 2023-02-28 RX ORDER — HEPARIN SODIUM (PORCINE) LOCK FLUSH IV SOLN 100 UNIT/ML 100 UNIT/ML
5 SOLUTION INTRAVENOUS
Status: CANCELLED | OUTPATIENT
Start: 2023-03-02

## 2023-02-28 RX ORDER — MEPERIDINE HYDROCHLORIDE 25 MG/ML
25 INJECTION INTRAMUSCULAR; INTRAVENOUS; SUBCUTANEOUS EVERY 30 MIN PRN
Status: CANCELLED | OUTPATIENT
Start: 2023-03-02

## 2023-02-28 RX ORDER — DIPHENHYDRAMINE HYDROCHLORIDE 50 MG/ML
50 INJECTION INTRAMUSCULAR; INTRAVENOUS
Status: CANCELLED
Start: 2023-03-02

## 2023-02-28 RX ADMIN — Medication 250 ML: at 14:52

## 2023-02-28 ASSESSMENT — PAIN SCALES - GENERAL: PAINLEVEL: NO PAIN (0)

## 2023-02-28 NOTE — PROGRESS NOTES
Infusion Nursing Note:  Ottoniel Mccormack presents today for Venofer dose 2.    Patient seen by provider today: No   present during visit today: Not Applicable.    Note: N/A.    Intravenous Access:  Peripheral IV placed.    Treatment Conditions:  Not Applicable.    Post Infusion Assessment:  Patient tolerated infusion without incident.     Discharge Plan:   AVS to patient via MYCHART.  Patient will return 3/7/2023 for next appointment.   Patient discharged in stable condition accompanied by: self.      Shira Bradley RN                      
n/a

## 2023-03-17 DIAGNOSIS — G47.00 INSOMNIA, UNSPECIFIED TYPE: ICD-10-CM

## 2023-03-20 RX ORDER — TRAZODONE HYDROCHLORIDE 50 MG/1
TABLET, FILM COATED ORAL
Qty: 180 TABLET | Refills: 1 | Status: SHIPPED | OUTPATIENT
Start: 2023-03-20 | End: 2024-01-22

## 2023-03-22 ENCOUNTER — INFUSION THERAPY VISIT (OUTPATIENT)
Dept: INFUSION THERAPY | Facility: CLINIC | Age: 38
End: 2023-03-22
Attending: STUDENT IN AN ORGANIZED HEALTH CARE EDUCATION/TRAINING PROGRAM
Payer: COMMERCIAL

## 2023-03-22 VITALS
DIASTOLIC BLOOD PRESSURE: 67 MMHG | OXYGEN SATURATION: 97 % | WEIGHT: 187.7 LBS | SYSTOLIC BLOOD PRESSURE: 106 MMHG | BODY MASS INDEX: 30.75 KG/M2 | TEMPERATURE: 98.7 F | HEART RATE: 92 BPM | RESPIRATION RATE: 18 BRPM

## 2023-03-22 DIAGNOSIS — D50.0 IRON DEFICIENCY ANEMIA DUE TO CHRONIC BLOOD LOSS: Primary | ICD-10-CM

## 2023-03-22 PROCEDURE — 96365 THER/PROPH/DIAG IV INF INIT: CPT | Performed by: NURSE PRACTITIONER

## 2023-03-22 PROCEDURE — 96366 THER/PROPH/DIAG IV INF ADDON: CPT | Performed by: NURSE PRACTITIONER

## 2023-03-22 PROCEDURE — 99207 PR NO CHARGE LOS: CPT

## 2023-03-22 RX ORDER — EPINEPHRINE 1 MG/ML
0.3 INJECTION, SOLUTION INTRAMUSCULAR; SUBCUTANEOUS EVERY 5 MIN PRN
Status: CANCELLED | OUTPATIENT
Start: 2023-03-22

## 2023-03-22 RX ORDER — MEPERIDINE HYDROCHLORIDE 25 MG/ML
25 INJECTION INTRAMUSCULAR; INTRAVENOUS; SUBCUTANEOUS EVERY 30 MIN PRN
Status: CANCELLED | OUTPATIENT
Start: 2023-03-22

## 2023-03-22 RX ORDER — METHYLPREDNISOLONE SODIUM SUCCINATE 125 MG/2ML
125 INJECTION, POWDER, LYOPHILIZED, FOR SOLUTION INTRAMUSCULAR; INTRAVENOUS
Status: CANCELLED
Start: 2023-03-22

## 2023-03-22 RX ORDER — DIPHENHYDRAMINE HYDROCHLORIDE 50 MG/ML
50 INJECTION INTRAMUSCULAR; INTRAVENOUS
Status: CANCELLED
Start: 2023-03-22

## 2023-03-22 RX ORDER — ALBUTEROL SULFATE 90 UG/1
1-2 AEROSOL, METERED RESPIRATORY (INHALATION)
Status: CANCELLED
Start: 2023-03-22

## 2023-03-22 RX ORDER — HEPARIN SODIUM (PORCINE) LOCK FLUSH IV SOLN 100 UNIT/ML 100 UNIT/ML
5 SOLUTION INTRAVENOUS
Status: CANCELLED | OUTPATIENT
Start: 2023-03-22

## 2023-03-22 RX ORDER — ALBUTEROL SULFATE 0.83 MG/ML
2.5 SOLUTION RESPIRATORY (INHALATION)
Status: CANCELLED | OUTPATIENT
Start: 2023-03-22

## 2023-03-22 RX ORDER — HEPARIN SODIUM,PORCINE 10 UNIT/ML
5 VIAL (ML) INTRAVENOUS
Status: CANCELLED | OUTPATIENT
Start: 2023-03-22

## 2023-03-22 RX ADMIN — Medication 250 ML: at 15:54

## 2023-03-23 ENCOUNTER — TELEPHONE (OUTPATIENT)
Dept: HEMATOLOGY | Facility: CLINIC | Age: 38
End: 2023-03-23
Payer: COMMERCIAL

## 2023-03-24 NOTE — TELEPHONE ENCOUNTER
[id number]  Ottoniel Mccormack  37 year old female  CBCD Diagnosis: CHA, hx of ischemic stroke  CBCD Provider: Jacob Cogan MD Shynell K Hill is calling wondering about need for blood work prior to her return visit with Dr. Cogan early next month.  She just completed 3 of 3 iron infusions yesterday.  After confirmed with Dr. Cogan did let her know that we will check her iron levels 4-6 weeks after her last iron infusion, so she does not need any blood work before her next visit.  She also does not need to continue oral iron until after talking with Dr. Cogan.    Janie ROSENN, RN   Nurse Clinician  [facility]  Office: [phone]  Main Office: [phone] (ask to speak with a nurse)  Fax: [phone]

## 2023-04-06 DIAGNOSIS — J45.30 MILD PERSISTENT ASTHMA WITHOUT COMPLICATION: ICD-10-CM

## 2023-04-06 RX ORDER — ALBUTEROL SULFATE 90 UG/1
AEROSOL, METERED RESPIRATORY (INHALATION)
Qty: 8.5 G | Refills: 1 | Status: SHIPPED | OUTPATIENT
Start: 2023-04-06 | End: 2023-06-08

## 2023-04-10 ENCOUNTER — TELEPHONE (OUTPATIENT)
Dept: DERMATOLOGY | Facility: CLINIC | Age: 38
End: 2023-04-10
Payer: COMMERCIAL

## 2023-04-10 NOTE — TELEPHONE ENCOUNTER
Prior Authorization Specialty Medication Request    Medication/Dose: dupilumab (DUPIXENT) 300 MG/2ML     Insurance Name: [employer]  Insurance ID:[id number]       Pharmacy Information (if different than what is on RX)  Name:  [facility]   Phone:  [phone]  Key: [id number]    Thank you,    Flora Villalba  Specialty Clinic -   [facility]

## 2023-04-11 NOTE — TELEPHONE ENCOUNTER
Prior Authorization Approval    Authorization Effective Date: 3/12/2023  Authorization Expiration Date: 4/10/2024  Medication: dupilumab (DUPIXENT) 300 MG/2ML   Approved Dose/Quantity:   Reference #: [id number]   Insurance Company: [employer] - Phone [phone] Fax [phone]  Which [facility] the [facility] for [facility] [address]  Pharmacy Notified: Yes  Patient Notified: Yes

## 2023-04-11 NOTE — TELEPHONE ENCOUNTER
PA Initiation    Medication: dupilumab (DUPIXENT) 300 MG/2ML   Insurance Company: [employer] - Phone [phone] Fax [phone]  Pharmacy Filling the Rx: [address]  Filling Pharmacy Phone: [phone]  Filling Pharmacy Fax: [phone]  Start Date: 4/11/2023

## 2023-04-27 RX ORDER — NORETHINDRONE ACETATE 5 MG
TABLET ORAL
Qty: 90 TABLET | Refills: 0 | OUTPATIENT
Start: 2023-04-27

## 2023-05-18 ENCOUNTER — PATIENT OUTREACH (OUTPATIENT)
Dept: CARE COORDINATION | Facility: CLINIC | Age: 38
End: 2023-05-18
Payer: COMMERCIAL

## 2023-06-07 DIAGNOSIS — J45.30 MILD PERSISTENT ASTHMA WITHOUT COMPLICATION: ICD-10-CM

## 2023-06-08 RX ORDER — ALBUTEROL SULFATE 90 UG/1
AEROSOL, METERED RESPIRATORY (INHALATION)
Qty: 6.7 G | Refills: 3 | Status: SHIPPED | OUTPATIENT
Start: 2023-06-08 | End: 2023-06-29

## 2023-06-15 DIAGNOSIS — J45.30 MILD PERSISTENT ASTHMA WITHOUT COMPLICATION: ICD-10-CM

## 2023-06-15 RX ORDER — MONTELUKAST SODIUM 10 MG/1
TABLET ORAL
Qty: 90 TABLET | Refills: 1 | Status: SHIPPED | OUTPATIENT
Start: 2023-06-15 | End: 2024-01-03

## 2023-06-24 DIAGNOSIS — J45.30 MILD PERSISTENT ASTHMA WITHOUT COMPLICATION: ICD-10-CM

## 2023-06-27 RX ORDER — BECLOMETHASONE DIPROPIONATE HFA 80 UG/1
AEROSOL, METERED RESPIRATORY (INHALATION)
Qty: 10.6 G | Refills: 0 | OUTPATIENT
Start: 2023-06-27

## 2023-06-27 NOTE — TELEPHONE ENCOUNTER
Follow up visit needed to discuss medication change due to insurance and asthma follow up.    Lucia Mejia M.D.

## 2023-06-28 ENCOUNTER — TELEPHONE (OUTPATIENT)
Dept: OBGYN | Facility: CLINIC | Age: 38
End: 2023-06-28
Payer: COMMERCIAL

## 2023-06-28 NOTE — TELEPHONE ENCOUNTER
I called the patient and referred her to HIM's for slide to be sent out to the [facility]. Patient will call back and schedule, if it is possible to send slides out.    Carmen Dueñas CMA 6/28/2023 1:28 PM

## 2023-06-28 NOTE — TELEPHONE ENCOUNTER
[facility] Call Center    Phone Message    May a detailed message be left on voicemail: yes     Reason for Call: Other:        Pt had a consultation at [facility].  They are recommending a hysteroscopy and endometrial biopsy.         Ottoniel asked them if she could do that locally here and they said yes, but they will need the slides (not just the results) sent to them after the procedure is done.     She is wondering if Dr Hernandez or someone on her team would be able to do that for her.       Please call Ottoniel back to discuss.         Action Taken: Message routed to:  [facility] p [phone]    Travel Screening: Not Applicable

## 2023-06-29 ENCOUNTER — VIRTUAL VISIT (OUTPATIENT)
Dept: FAMILY MEDICINE | Facility: CLINIC | Age: 38
End: 2023-06-29
Payer: COMMERCIAL

## 2023-06-29 ENCOUNTER — TELEPHONE (OUTPATIENT)
Dept: FAMILY MEDICINE | Facility: CLINIC | Age: 38
End: 2023-06-29

## 2023-06-29 DIAGNOSIS — J45.30 MILD PERSISTENT ASTHMA WITHOUT COMPLICATION: Primary | ICD-10-CM

## 2023-06-29 DIAGNOSIS — G43.009 MIGRAINE WITHOUT AURA AND WITHOUT STATUS MIGRAINOSUS, NOT INTRACTABLE: ICD-10-CM

## 2023-06-29 DIAGNOSIS — I63.511 ACUTE ISCHEMIC RIGHT MCA STROKE (H): ICD-10-CM

## 2023-06-29 DIAGNOSIS — Z91.013 FISH ALLERGY: ICD-10-CM

## 2023-06-29 DIAGNOSIS — Z91.013 SHELLFISH ALLERGY: ICD-10-CM

## 2023-06-29 DIAGNOSIS — Z86.73 H/O ISCHEMIC RIGHT MCA STROKE: ICD-10-CM

## 2023-06-29 PROCEDURE — 99214 OFFICE O/P EST MOD 30 MIN: CPT | Mod: VID | Performed by: PHYSICIAN ASSISTANT

## 2023-06-29 RX ORDER — EPINEPHRINE 0.3 MG/.3ML
INJECTION SUBCUTANEOUS
Qty: 2 EACH | Refills: 1 | Status: SHIPPED | OUTPATIENT
Start: 2023-06-29

## 2023-06-29 RX ORDER — ALBUTEROL SULFATE 90 UG/1
2 AEROSOL, METERED RESPIRATORY (INHALATION) EVERY 4 HOURS PRN
Qty: 6.7 G | Refills: 3 | Status: SHIPPED | OUTPATIENT
Start: 2023-06-29 | End: 2023-10-24

## 2023-06-29 RX ORDER — SUMATRIPTAN 50 MG/1
50 TABLET, FILM COATED ORAL
Qty: 9 TABLET | Refills: 1 | Status: SHIPPED | OUTPATIENT
Start: 2023-06-29 | End: 2024-05-24

## 2023-06-29 RX ORDER — BECLOMETHASONE DIPROPIONATE HFA 80 UG/1
1 AEROSOL, METERED RESPIRATORY (INHALATION) 2 TIMES DAILY
Qty: 10.6 G | Refills: 5 | Status: SHIPPED | OUTPATIENT
Start: 2023-06-29 | End: 2024-05-24

## 2023-06-29 ASSESSMENT — ASTHMA QUESTIONNAIRES
ACT_TOTALSCORE: 20
QUESTION_5 LAST FOUR WEEKS HOW WOULD YOU RATE YOUR ASTHMA CONTROL: WELL CONTROLLED
QUESTION_2 LAST FOUR WEEKS HOW OFTEN HAVE YOU HAD SHORTNESS OF BREATH: ONCE OR TWICE A WEEK
ACT_TOTALSCORE: 20
QUESTION_1 LAST FOUR WEEKS HOW MUCH OF THE TIME DID YOUR ASTHMA KEEP YOU FROM GETTING AS MUCH DONE AT WORK, SCHOOL OR AT HOME: NONE OF THE TIME
QUESTION_3 LAST FOUR WEEKS HOW OFTEN DID YOUR ASTHMA SYMPTOMS (WHEEZING, COUGHING, SHORTNESS OF BREATH, CHEST TIGHTNESS OR PAIN) WAKE YOU UP AT NIGHT OR EARLIER THAN USUAL IN THE MORNING: NOT AT ALL
QUESTION_4 LAST FOUR WEEKS HOW OFTEN HAVE YOU USED YOUR RESCUE INHALER OR NEBULIZER MEDICATION (SUCH AS ALBUTEROL): ONE OR TWO TIMES PER DAY

## 2023-06-29 ASSESSMENT — PATIENT HEALTH QUESTIONNAIRE - PHQ9
SUM OF ALL RESPONSES TO PHQ QUESTIONS 1-9: 0
10. IF YOU CHECKED OFF ANY PROBLEMS, HOW DIFFICULT HAVE THESE PROBLEMS MADE IT FOR YOU TO DO YOUR WORK, TAKE CARE OF THINGS AT HOME, OR GET ALONG WITH OTHER PEOPLE: NOT DIFFICULT AT ALL
SUM OF ALL RESPONSES TO PHQ QUESTIONS 1-9: 0

## 2023-06-29 NOTE — PROGRESS NOTES
"Ottoniel is a 37 year old who is being evaluated via a billable video visit.      How would you like to obtain your AVS? MyChart  If the video visit is dropped, the invitation should be resent by: Text to cell phone: [phone]  Will anyone else be joining your video visit? No          Assessment & Plan     Mild persistent asthma without complication  She believes asthma is controlled with current therapies.  Refilled qvar and albuterol  - QVAR REDIHALER 80 MCG/ACT inhaler  Dispense: 10.6 g; Refill: 5  - albuterol (PROAIR HFA/PROVENTIL HFA/VENTOLIN HFA) 108 (90 Base) MCG/ACT inhaler  Dispense: 6.7 g; Refill: 3    Migraine without aura and without status migrainosus, not intractable  Migraine every 4 months   - SUMAtriptan (IMITREX) 50 MG tablet  Dispense: 9 tablet; Refill: 1    H/O ischemic right MCA stroke  Last October - no residual symptoms     Acute ischemic right MCA stroke (H)  Last October- related to tranexamic acid for uterine fibroids   Has seen hematology and looks like they wanted her to follow up with them but missed appointment    Shellfish allergy  Refilled epipen  - EPINEPHrine (ANY BX GENERIC EQUIV) 0.3 MG/0.3ML injection 2-pack  Dispense: 2 each; Refill: 1    Fish allergy  Refilled epipen  - EPINEPHrine (ANY BX GENERIC EQUIV) 0.3 MG/0.3ML injection 2-pack  Dispense: 2 each; Refill: 1      Prescription drug management         BMI:   Estimated body mass index is 30.75 kg/m  as calculated from the following:    Height as of 12/12/22: 1.664 m (5' 5.51\").    Weight as of 3/22/23: 85.1 kg (187 lb 11.2 oz).   Weight management plan: Discussed healthy diet and exercise guidelines    Patient Instructions   Continue medications as you have been taking  Schedule your annual exam with provider of choice as soon as possible.  Schedule a follow up with hematology as soon as possible.          Perla Nieto PA-C  [facility]    Sheri Alcazar is a 37 year old, presenting for " the following health issues:  Asthma and Medication Request (sumatriptan)        6/29/2023     9:00 AM   Additional Questions   Roomed by Bud     History of Present Illness     Asthma:  She presents for follow up of asthma.  She has no cough, no wheezing, and no shortness of breath. She is using a relief medication daily. She does not miss any doses of her controller medication throughout the week.Patient is aware of the following triggers: humidity and pollens. The patient has not had a visit to the Emergency Room, Urgent Care or Hospital due to asthma since the last clinic visit.     She eats 2-3 servings of fruits and vegetables daily.She consumes 1 sweetened beverage(s) daily.She exercises with enough effort to increase her heart rate 30 to 60 minutes per day.  She exercises with enough effort to increase her heart rate 3 or less days per week.   She is taking medications regularly.    Today's PHQ-9         PHQ-9 Total Score: 0    PHQ-9 Q9 Thoughts of better off dead/self-harm past 2 weeks :   Not at all    How difficult have these problems made it for you to do your work, take care of things at home, or get along with other people: Not difficult at all     Patient with history of ischemic stroke, asthma and migraines presents via video visit for QVar inhaler.  Overall feels asthma is controlled.  I have felt a bit more shortness of breath lately- due to humidity and poor air quality from [place] fires.    Not had an attack.  Has not required emergency department or urgent care visit Does have some Tightness in chest   Out of qvar   Using Albuterol once or twice a day.  Believes her asthma is well controlled   No side effects   Requests refills of sumitriptan   Does like have on hand- otherwise has to take excedrin daily- migraine once every 4 months- will develop migraine if over exerts herself   Had a Stroke last October - was taking tranexamic acid due to anemia from fibroids  Has received Iron  infusions for anemia.  Will be having fibroid embolization.  Reports no long lasting effects from stroke.  Left arm was paralyzed for a few days  Works from home -  for [employer] excusing from working from home will be expiring next month. Would like renewed          Review of Systems   Constitutional, HEENT, cardiovascular, pulmonary, gi and gu systems are negative, except as otherwise noted.      Objective           Vitals:  No vitals were obtained today due to virtual visit.    Physical Exam   GENERAL: Healthy, alert and no distress  EYES: Eyes grossly normal to inspection.  No discharge or erythema, or obvious scleral/conjunctival abnormalities.  RESP: No audible wheeze, cough, or visible cyanosis.  No visible retractions or increased work of breathing.    SKIN: Visible skin clear. No significant rash, abnormal pigmentation or lesions.  NEURO: Cranial nerves grossly intact.  Mentation and speech appropriate for age.  PSYCH: Mentation appears normal, affect normal/bright, judgement and insight intact, normal speech and appearance well-groomed.                Video-Visit Details    Type of service:  Video Visit     Originating Location (pt. Location): Home    Distant Location (provider location):  Off-site  Platform used for Video Visit: SnowGate

## 2023-06-29 NOTE — PATIENT INSTRUCTIONS
Continue medications as you have been taking  Schedule your annual exam with provider of choice as soon as possible.  Schedule a follow up with hematology as soon as possible.

## 2023-07-24 ENCOUNTER — OFFICE VISIT (OUTPATIENT)
Dept: OBGYN | Facility: CLINIC | Age: 38
End: 2023-07-24
Payer: COMMERCIAL

## 2023-07-24 VITALS — BODY MASS INDEX: 29.81 KG/M2 | WEIGHT: 182 LBS | SYSTOLIC BLOOD PRESSURE: 134 MMHG | DIASTOLIC BLOOD PRESSURE: 84 MMHG

## 2023-07-24 DIAGNOSIS — N93.9 ABNORMAL UTERINE BLEEDING (AUB): Primary | ICD-10-CM

## 2023-07-24 PROCEDURE — 99212 OFFICE O/P EST SF 10 MIN: CPT | Mod: 25 | Performed by: OBSTETRICS & GYNECOLOGY

## 2023-07-24 PROCEDURE — 88305 TISSUE EXAM BY PATHOLOGIST: CPT | Performed by: PATHOLOGY

## 2023-07-24 PROCEDURE — 58100 BIOPSY OF UTERUS LINING: CPT | Performed by: OBSTETRICS & GYNECOLOGY

## 2023-07-24 NOTE — PROGRESS NOTES
"OB/GYN      NAME:  Ottoniel Mccormack  PCP:  Valentín Hung  MRN:  [id number]    Impression / Plan     38 year old  with:      ICD-10-CM    1. Abnormal uterine bleeding (AUB)  N93.9 ENDOMETRIAL BIOPSY W/O CERVICAL DILATION     Surgical Pathology Exam          Endometrial biopsy completed today, see below.  She will follow-up with the team at the [facility].  She will double check with them that they indeed want the slides and not just the pathology report.    She understands uterine artery embolization may be associated with an increase in some adverse obstetric outcomes.      Chief Complaint     Chief Complaint   Patient presents with     Procedure     Endometrial biopsy       HPI     Ottoniel Mccormack is a  38 year old female presents for endometrial biopsy.    I last saw the patient 2022 for uterine fibroids and abnormal bleeding.  Patient desires future pregnancy and uterine preservation.  MRI demonstrated a number of uterine fibroids.  She was referred to [facility], where they can better support her comorbidities and she would have access to all management options including [facility], which is not available at [facility].    Care everywhere reviewed.  She met with them most recently 3/16/2023.  She opted for uterine artery embolization. \"Discussed recommendations for endometrial biopsy, but due to time constraints (she had car breakdown on way to office today) unable to complete today. She will either have locally or return for an exam visit.\"    She is here today for the biopsy.  She states she would like the slides sent to [facility] for evaluation.    Patient's last menstrual period was 2023.  She just finished her menstrual cycle and denies the possibility of pregnancy today.        Problem List     Patient Active Problem List    Diagnosis Date Noted     H/O ischemic right MCA stroke 2023     Priority: Medium     Acute ischemic right MCA stroke (H) 2023     Priority: " Medium     Mild persistent asthma without complication 06/29/2023     Priority: Medium     Shellfish allergy 06/29/2023     Priority: Medium     Migraine without aura and without status migrainosus, not intractable 06/29/2023     Priority: Medium     Iron deficiency anemia due to chronic blood loss 01/11/2023     Priority: Medium     Major depression, recurrent (H) 11/01/2022     Priority: Medium     Cervical cancer screening 07/01/2016     Priority: Medium     2013 NILM  2016 ASCUS, Positive for High Risk HPV type 16  30 y.o.  7/16 colpo: TARA 2  8/15/16 LEEP: TARA 3 with + endocervical margins  7/2017 NILM, hpv negative 32 y.o.  3/2019 NILM Negative HPV 33 y.o.PLAN, per ASCCP Guidelines:cotest 3/2022  6/17/22 NIL pap, Neg HPV. Plan: cotest in 3 years    *After excision or ablation for TARA 2+, cotest in 6 months, then cotest annually x 3, then cotest every 3 years for at least 25 years. As cervical cancer risk remains above general population levels, continued screening for as long as the pt remains in good health is acceptable. (2019 ASCCP guideline).  Any abnormal pap or + HR HPV test within the 25 years warrants a colposcopy.* (2019 ASCCP advisor answer).             Medications     Current Outpatient Medications   Medication     albuterol (PROAIR HFA/PROVENTIL HFA/VENTOLIN HFA) 108 (90 Base) MCG/ACT inhaler     aspirin (ASA) 81 MG chewable tablet     augmented betamethasone dipropionate (DIPROLENE AF) 0.05 % external cream     dupilumab (DUPIXENT) 300 MG/2ML prefilled pen     dupilumab (DUPIXENT) 300 MG/2ML prefilled pen     EPINEPHrine (ANY BX GENERIC EQUIV) 0.3 MG/0.3ML injection 2-pack     escitalopram (LEXAPRO) 20 MG tablet     ferrous sulfate (FEROSUL) 325 (65 Fe) MG tablet     montelukast (SINGULAIR) 10 MG tablet     norethindrone (AYGESTIN) 5 MG tablet     QVAR REDIHALER 80 MCG/ACT inhaler     SUMAtriptan (IMITREX) 50 MG tablet     tacrolimus (PROTOPIC) 0.1 % external ointment     traZODone (DESYREL) 50 MG  tablet     VITAMIN D PO     fluticasone (FLONASE) 50 MCG/ACT nasal spray     No current facility-administered medications for this visit.        Allergies     Allergies   Allergen Reactions     Fish Oil Anaphylaxis     Shellfish Allergy Hives       ROS     Pertinent positives and negatives are listed in the HPI.     Physical Exam   Vitals: /84   Wt 82.6 kg (182 lb)   LMP 07/18/2023   BMI 29.81 kg/m      General: Comfortable, no obvious distress  : Normal female external genitalia.  No lesions.  Urethral meatus normal.  Speculum exam reveals a normal vaginal vault, normal cervix.  No abnormal discharge.              10 min spent on the date of the encounter in chart review, patient visit, review of tests, documentation  about the issues documented above.     Lamar Hernandez MD       PROCEDURE: ENDOMETRIAL BIOPSY      After discussing the procedure and obtaining consent the patient was positioned in lithotomy and the cervix visualized with the speculum. The cervix was cleansed with betadine and the pipelle was inserted to  10  cm and small amount of tissue obtained. She tolerated this without difficulty.

## 2023-07-24 NOTE — PATIENT INSTRUCTIONS
If you have labs or imaging done, the results will automatically release in [employer] without an interpretation.  Your health care professional will review those results and send an interpretation with recommendations as soon as possible, but this may be 1-3 business days.    If you have any questions regarding your visit, please contact your care team.     [facility]: [phone]  [facility] CLINIC HOURS TELEPHONE NUMBER       MD Renu Hernandez - Certified Medical Assistant     Jenny- LEAD RN  Jane-PRERNA Martinez-PRERNA Ennis-  Indu-     Monday- [place]  8:00 a.m - 5:00 p.m    Tuesday- Surgery        Thursday- [place]  8:00 a.m - 5:00 p.m.    Friday- [place]  7:30 a.m - 4:00 p.m. [facility]  [address]  [place] [address]  [phone] Fax  Imaging Scheduling [phone]    [facility] Delivery  [address] [place] [address] [address].  [place], [place] [address] [phone]    [facility]  [address] [place], [place] [address] [phone] Fax  Imaging Scheduling [phone]     Urgent Care locations:  [facility] Monday-Friday  10 am - 8 pm  Saturday and Sunday   9 am - 5 pm  Monday-Friday   10 am - 8 pm  Saturday and Sunday   9 am - 5 pm   [phone]     **Surgeries** Our Surgery Schedulers will contact you to schedule. If you do not receive a call within 3 business days, please call [phone].    If you need a medication refill, please contact your pharmacy. Please allow 3 business days for your refill to be completed.    As always, thank you for trusting us with your healthcare needs!

## 2023-07-26 DIAGNOSIS — D25.0 INTRAMURAL, SUBMUCOUS, AND SUBSEROUS LEIOMYOMA OF UTERUS: ICD-10-CM

## 2023-07-26 DIAGNOSIS — N71.1 CHRONIC ENDOMETRITIS: Primary | ICD-10-CM

## 2023-07-26 DIAGNOSIS — D25.2 INTRAMURAL, SUBMUCOUS, AND SUBSEROUS LEIOMYOMA OF UTERUS: ICD-10-CM

## 2023-07-26 DIAGNOSIS — D25.1 INTRAMURAL, SUBMUCOUS, AND SUBSEROUS LEIOMYOMA OF UTERUS: ICD-10-CM

## 2023-07-26 LAB
PATH REPORT.COMMENTS IMP SPEC: NORMAL
PATH REPORT.COMMENTS IMP SPEC: NORMAL
PATH REPORT.FINAL DX SPEC: NORMAL
PATH REPORT.GROSS SPEC: NORMAL
PATH REPORT.MICROSCOPIC SPEC OTHER STN: NORMAL
PATH REPORT.RELEVANT HX SPEC: NORMAL
PHOTO IMAGE: NORMAL

## 2023-07-26 RX ORDER — DOXYCYCLINE 100 MG/1
100 CAPSULE ORAL 2 TIMES DAILY
Qty: 20 CAPSULE | Refills: 0 | Status: SHIPPED | OUTPATIENT
Start: 2023-07-26 | End: 2023-08-05

## 2023-08-24 DIAGNOSIS — D25.2 INTRAMURAL, SUBMUCOUS, AND SUBSEROUS LEIOMYOMA OF UTERUS: ICD-10-CM

## 2023-08-24 DIAGNOSIS — D25.1 INTRAMURAL, SUBMUCOUS, AND SUBSEROUS LEIOMYOMA OF UTERUS: ICD-10-CM

## 2023-08-24 DIAGNOSIS — D25.0 INTRAMURAL, SUBMUCOUS, AND SUBSEROUS LEIOMYOMA OF UTERUS: ICD-10-CM

## 2023-08-24 NOTE — TELEPHONE ENCOUNTER
Health Call Center    Phone Message    May a detailed message be left on voicemail: yes     Reason for Call: Medication Refill Request    Has the patient contacted the pharmacy for the refill? Yes   Name of medication being requested: norethindrone (AYGESTIN) 5 MG tablet   Provider who prescribed the medication: Dr. Hernandez  Pharmacy:    [id number] - [place], [place] - [address] [facility] - [place], [place] - [address]   Date medication is needed: 08/25/2023       Action Taken: Message routed to:  [facility] p [id number]    Travel Screening: Not Applicable

## 2023-08-25 RX ORDER — NORETHINDRONE ACETATE 5 MG
5 TABLET ORAL 2 TIMES DAILY
Qty: 90 TABLET | Refills: 0 | Status: SHIPPED | OUTPATIENT
Start: 2023-08-25 | End: 2024-02-05

## 2023-08-25 NOTE — TELEPHONE ENCOUNTER
"Requested Prescriptions   Pending Prescriptions Disp Refills    norethindrone (AYGESTIN) 5 MG tablet 90 tablet 0     Sig: Take 1 tablet (5 mg) by mouth 2 times daily       There is no refill protocol information for this order        Pt last seen 7/24/2023 for AUB: 'Endometrial biopsy completed today, see below. She will follow-up with the team at the [facility]. She will double check with them that they indeed want the slides and not just the pathology report. \"    Last prescribed 12/13/2022 for 90 tablets with 0 refills     Routing refill request to provider for review/approval because:  Drug not on the [facility] refill protocol     Jenny Ivan RN on 8/25/2023 at 8:44 AM        "

## 2023-09-16 DIAGNOSIS — D25.0 INTRAMURAL, SUBMUCOUS, AND SUBSEROUS LEIOMYOMA OF UTERUS: ICD-10-CM

## 2023-09-16 DIAGNOSIS — D25.1 INTRAMURAL, SUBMUCOUS, AND SUBSEROUS LEIOMYOMA OF UTERUS: ICD-10-CM

## 2023-09-16 DIAGNOSIS — D25.2 INTRAMURAL, SUBMUCOUS, AND SUBSEROUS LEIOMYOMA OF UTERUS: ICD-10-CM

## 2023-09-24 ENCOUNTER — HEALTH MAINTENANCE LETTER (OUTPATIENT)
Age: 38
End: 2023-09-24

## 2023-10-06 ENCOUNTER — E-VISIT (OUTPATIENT)
Dept: URGENT CARE | Facility: CLINIC | Age: 38
End: 2023-10-06
Payer: COMMERCIAL

## 2023-10-06 DIAGNOSIS — R21 RASH AND NONSPECIFIC SKIN ERUPTION: Primary | ICD-10-CM

## 2023-10-06 PROCEDURE — 99207 PR NON-BILLABLE SERV PER CHARTING: CPT | Performed by: EMERGENCY MEDICINE

## 2023-10-06 NOTE — PATIENT INSTRUCTIONS
Dear Ottoniel Mccormack,    We are sorry you are not feeling well. Based on the responses you provided, it is recommended that you be seen in-person in urgent care so we can better evaluate your symptoms. Please click here to find the nearest urgent care location to you.   You will not be charged for this Visit. Thank you for trusting us with your care.    Eloy Woodard MD

## 2023-10-25 ENCOUNTER — OFFICE VISIT (OUTPATIENT)
Dept: DERMATOLOGY | Facility: CLINIC | Age: 38
End: 2023-10-25
Payer: COMMERCIAL

## 2023-10-25 DIAGNOSIS — L20.9 ATOPIC DERMATITIS, UNSPECIFIED TYPE: Primary | ICD-10-CM

## 2023-10-25 PROCEDURE — 99213 OFFICE O/P EST LOW 20 MIN: CPT | Performed by: PHYSICIAN ASSISTANT

## 2023-10-25 RX ORDER — TACROLIMUS 1 MG/G
OINTMENT TOPICAL
Qty: 30 G | Refills: 4 | Status: SHIPPED | OUTPATIENT
Start: 2023-10-25

## 2023-10-25 RX ORDER — BETAMETHASONE DIPROPIONATE 0.5 MG/G
CREAM TOPICAL
Qty: 50 G | Refills: 6 | Status: SHIPPED | OUTPATIENT
Start: 2023-10-25

## 2023-10-25 NOTE — LETTER
10/25/2023         RE: Ottoniel Mccormack  [address] [place] [address]        Dear Colleague,    Thank you for referring your patient, Ottoniel Mccormack, to the [facility]. Please see a copy of my visit note below.    Ottoniel Mccormack is an extremely pleasant 38 year old year old female patient here today for recheck atopic dermatitis. She notes that she initially saw improvements with dupixent but then stopped doing it consistently every two weeks since she does not like needles. She reports that since then her skin has been flaring. She has been alternating betamethasone and protopic, using betamethasone more frequently. Patient has no other skin complaints today.  Remainder of the HPI, Meds, PMH, Allergies, FH, and SH was reviewed in chart.   No past medical history on file.    Past Surgical History:   Procedure Laterality Date     LEEP TX, CERVICAL          Family History   Problem Relation Age of Onset     Hypertension Mother      Other - See Comments Father 67        Julia - thinks pancreatic cancer      No Known Problems Brother      Diabetes Maternal Grandmother         adult      No Known Problems Brother      No Known Problems Brother      No Known Problems Brother      No Known Problems Brother      Breast Cancer No family hx of      Colon Cancer No family hx of        Social History     Socioeconomic History     Marital status: Single     Spouse name: Not on file     Number of children: Not on file     Years of education: Not on file     Highest education level: Not on file   Occupational History     Not on file   Tobacco Use     Smoking status: Never     Smokeless tobacco: Never   Vaping Use     Vaping Use: Never used   Substance and Sexual Activity     Alcohol use: Yes     Comment: once a week     Drug use: Never     Sexual activity: Yes     Partners: Male     Birth control/protection: None   Other Topics Concern     Not on file   Social History Narrative     Not on  file     Social Determinants of Health     Financial Resource Strain: Not on file   Food Insecurity: Not on file   Transportation Needs: Not on file   Physical Activity: Not on file   Stress: Not on file   Social Connections: Not on file   Interpersonal Safety: Not on file   Housing Stability: Not on file       Outpatient Encounter Medications as of 10/25/2023   Medication Sig Dispense Refill     augmented betamethasone dipropionate (DIPROLENE AF) 0.05 % external cream Apply twice daily as needed. 50 g 6     dupilumab (DUPIXENT) 300 MG/2ML prefilled pen Inject 4 mLs (600 mg) Subcutaneous See Admin Instructions for 1 dose 4 mL 0     dupilumab (DUPIXENT) 300 MG/2ML prefilled pen Inject 2 mLs (300 mg) Subcutaneous every 14 days 4 mL 11     tacrolimus (PROTOPIC) 0.1 % external ointment Apply twice daily as needed. 30 g 4     albuterol (PROAIR HFA/PROVENTIL HFA/VENTOLIN HFA) 108 (90 Base) MCG/ACT inhaler INHALE 2 PUFFS INTO THE LUNGS EVERY 4 HOURS AS NEEDED FOR SHORTNESS OF BREATH, WHEEZING OR COUGH 6.7 g 1     aspirin (ASA) 81 MG chewable tablet Take 81 mg by mouth       dupilumab (DUPIXENT) 300 MG/2ML prefilled pen Inject 4 mLs (600 mg) Subcutaneous See Admin Instructions for 1 dose (Patient not taking: Reported on 10/25/2023) 4 mL 0     dupilumab (DUPIXENT) 300 MG/2ML prefilled pen Inject 2 mLs (300 mg) Subcutaneous every 14 days (Patient not taking: Reported on 10/25/2023) 4 mL 11     EPINEPHrine (ANY BX GENERIC EQUIV) 0.3 MG/0.3ML injection 2-pack Inject into the muscle as directed for anaphylaxis 2 each 1     escitalopram (LEXAPRO) 20 MG tablet TAKE 1 TABLET BY MOUTH EVERY DAY 90 tablet 1     ferrous sulfate (FEROSUL) 325 (65 Fe) MG tablet TAKE 1 TABLET BY MOUTH EVERY DAY WITH BREAKFAST 90 tablet 1     fluticasone (FLONASE) 50 MCG/ACT nasal spray Spray 1 spray into both nostrils daily (Patient not taking: Reported on 6/29/2023) 16 g 3     montelukast (SINGULAIR) 10 MG tablet TAKE 1 TABLET BY MOUTH EVERYDAY AT  BEDTIME 90 tablet 1     norethindrone (AYGESTIN) 5 MG tablet Take 1 tablet (5 mg) by mouth 2 times daily 90 tablet 0     QVAR REDIHALER 80 MCG/ACT inhaler Inhale 1 puff into the lungs 2 times daily 10.6 g 5     SUMAtriptan (IMITREX) 50 MG tablet Take 1 tablet (50 mg) by mouth at onset of headache for migraine May repeat in 2 hours. Max 4 tablets/24 hours. 9 tablet 1     traZODone (DESYREL) 50 MG tablet TAKE 1-2 TABLETS BY MOUTH AT BEDTIME AS NEEDED FOR SLEEP 180 tablet 1     VITAMIN D PO        [DISCONTINUED] augmented betamethasone dipropionate (DIPROLENE AF) 0.05 % external cream Apply twice daily as needed. 50 g 6     [DISCONTINUED] tacrolimus (PROTOPIC) 0.1 % external ointment Apply twice daily as needed. 30 g 4     No facility-administered encounter medications on file as of 10/25/2023.             O:   NAD, WDWN, Alert & Oriented, Mood & Affect wnl, Vitals stable   Here today alone   There were no vitals taken for this visit.   General appearance normal   Vitals stable   Alert, oriented and in no acute distress      Eczematous plaques on face, torso and extremities with areas of lichenification       Eyes: Conjunctivae/lids:Normal     ENT: Lips,: normal    MSK:Normal    Pulm: Breathing Normal    Neuro/Psych: Orientation:Alert and Orientedx3 ; Mood/Affect:normal     A/P:  Atopic dermatitis  Discussed biologic vs deidre inhibitors.   She preferred to stay with biologics since this will not cause as many systemic side effects.   She would like to retry dupixent.   If not improving after 3 months we can change to adbry.   Skin care regimen reviewed with patient: Eliminate harsh soaps, i.e. Dial, zest, irsih spring; Mild soaps such as Cetaphil or Dove sensitive skin, avoid hot or cold showers, aggressive use of emollients including vanicream, cetaphil or cerave discussed with patient.    Continue topical as needed.   Return to clinic in one year if symptoms controlled.       Again, thank you for allowing me to  participate in the care of your patient.        Sincerely,        Vanessa Hernandez PA-C

## 2023-10-25 NOTE — PROGRESS NOTES
Ottoniel Mccormack is an extremely pleasant 38 year old year old female patient here today for recheck atopic dermatitis. She notes that she initially saw improvements with dupixent but then stopped doing it consistently every two weeks since she does not like needles. She reports that since then her skin has been flaring. She has been alternating betamethasone and protopic, using betamethasone more frequently. Patient has no other skin complaints today.  Remainder of the HPI, Meds, PMH, Allergies, FH, and SH was reviewed in chart.   No past medical history on file.    Past Surgical History:   Procedure Laterality Date    LEEP TX, CERVICAL          Family History   Problem Relation Age of Onset    Hypertension Mother     Other - See Comments Father 67        Julia - thinks pancreatic cancer     No Known Problems Brother     Diabetes Maternal Grandmother         adult     No Known Problems Brother     No Known Problems Brother     No Known Problems Brother     No Known Problems Brother     Breast Cancer No family hx of     Colon Cancer No family hx of        Social History     Socioeconomic History    Marital status: Single     Spouse name: Not on file    Number of children: Not on file    Years of education: Not on file    Highest education level: Not on file   Occupational History    Not on file   Tobacco Use    Smoking status: Never    Smokeless tobacco: Never   Vaping Use    Vaping Use: Never used   Substance and Sexual Activity    Alcohol use: Yes     Comment: once a week    Drug use: Never    Sexual activity: Yes     Partners: Male     Birth control/protection: None   Other Topics Concern    Not on file   Social History Narrative    Not on file     Social Determinants of Health     Financial Resource Strain: Not on file   Food Insecurity: Not on file   Transportation Needs: Not on file   Physical Activity: Not on file   Stress: Not on file   Social Connections: Not on file   Interpersonal Safety: Not on file    Housing Stability: Not on file       Outpatient Encounter Medications as of 10/25/2023   Medication Sig Dispense Refill    augmented betamethasone dipropionate (DIPROLENE AF) 0.05 % external cream Apply twice daily as needed. 50 g 6    dupilumab (DUPIXENT) 300 MG/2ML prefilled pen Inject 4 mLs (600 mg) Subcutaneous See Admin Instructions for 1 dose 4 mL 0    dupilumab (DUPIXENT) 300 MG/2ML prefilled pen Inject 2 mLs (300 mg) Subcutaneous every 14 days 4 mL 11    tacrolimus (PROTOPIC) 0.1 % external ointment Apply twice daily as needed. 30 g 4    albuterol (PROAIR HFA/PROVENTIL HFA/VENTOLIN HFA) 108 (90 Base) MCG/ACT inhaler INHALE 2 PUFFS INTO THE LUNGS EVERY 4 HOURS AS NEEDED FOR SHORTNESS OF BREATH, WHEEZING OR COUGH 6.7 g 1    aspirin (ASA) 81 MG chewable tablet Take 81 mg by mouth      dupilumab (DUPIXENT) 300 MG/2ML prefilled pen Inject 4 mLs (600 mg) Subcutaneous See Admin Instructions for 1 dose (Patient not taking: Reported on 10/25/2023) 4 mL 0    dupilumab (DUPIXENT) 300 MG/2ML prefilled pen Inject 2 mLs (300 mg) Subcutaneous every 14 days (Patient not taking: Reported on 10/25/2023) 4 mL 11    EPINEPHrine (ANY BX GENERIC EQUIV) 0.3 MG/0.3ML injection 2-pack Inject into the muscle as directed for anaphylaxis 2 each 1    escitalopram (LEXAPRO) 20 MG tablet TAKE 1 TABLET BY MOUTH EVERY DAY 90 tablet 1    ferrous sulfate (FEROSUL) 325 (65 Fe) MG tablet TAKE 1 TABLET BY MOUTH EVERY DAY WITH BREAKFAST 90 tablet 1    fluticasone (FLONASE) 50 MCG/ACT nasal spray Spray 1 spray into both nostrils daily (Patient not taking: Reported on 6/29/2023) 16 g 3    montelukast (SINGULAIR) 10 MG tablet TAKE 1 TABLET BY MOUTH EVERYDAY AT BEDTIME 90 tablet 1    norethindrone (AYGESTIN) 5 MG tablet Take 1 tablet (5 mg) by mouth 2 times daily 90 tablet 0    QVAR REDIHALER 80 MCG/ACT inhaler Inhale 1 puff into the lungs 2 times daily 10.6 g 5    SUMAtriptan (IMITREX) 50 MG tablet Take 1 tablet (50 mg) by mouth at onset of  headache for migraine May repeat in 2 hours. Max 4 tablets/24 hours. 9 tablet 1    traZODone (DESYREL) 50 MG tablet TAKE 1-2 TABLETS BY MOUTH AT BEDTIME AS NEEDED FOR SLEEP 180 tablet 1    VITAMIN D PO       [DISCONTINUED] augmented betamethasone dipropionate (DIPROLENE AF) 0.05 % external cream Apply twice daily as needed. 50 g 6    [DISCONTINUED] tacrolimus (PROTOPIC) 0.1 % external ointment Apply twice daily as needed. 30 g 4     No facility-administered encounter medications on file as of 10/25/2023.             O:   NAD, WDWN, Alert & Oriented, Mood & Affect wnl, Vitals stable   Here today alone   There were no vitals taken for this visit.   General appearance normal   Vitals stable   Alert, oriented and in no acute distress      Eczematous plaques on face, torso and extremities with areas of lichenification       Eyes: Conjunctivae/lids:Normal     ENT: Lips,: normal    MSK:Normal    Pulm: Breathing Normal    Neuro/Psych: Orientation:Alert and Orientedx3 ; Mood/Affect:normal     A/P:  Atopic dermatitis  Discussed biologic vs deidre inhibitors.   She preferred to stay with biologics since this will not cause as many systemic side effects.   She would like to retry dupixent.   If not improving after 3 months we can change to adbry.   Skin care regimen reviewed with patient: Eliminate harsh soaps, i.e. Dial, zest, irsih spring; Mild soaps such as Cetaphil or Dove sensitive skin, avoid hot or cold showers, aggressive use of emollients including vanicream, cetaphil or cerave discussed with patient.    Continue topical as needed.   Return to clinic in one year if symptoms controlled.

## 2023-12-15 RX ORDER — NORETHINDRONE ACETATE 5 MG
5 TABLET ORAL 2 TIMES DAILY
Qty: 60 TABLET | Refills: 1 | OUTPATIENT
Start: 2023-12-15

## 2023-12-30 DIAGNOSIS — J45.30 MILD PERSISTENT ASTHMA WITHOUT COMPLICATION: ICD-10-CM

## 2024-01-03 RX ORDER — MONTELUKAST SODIUM 10 MG/1
TABLET ORAL
Qty: 90 TABLET | Refills: 1 | Status: SHIPPED | OUTPATIENT
Start: 2024-01-03 | End: 2024-05-24

## 2024-01-05 DIAGNOSIS — J45.30 MILD PERSISTENT ASTHMA WITHOUT COMPLICATION: ICD-10-CM

## 2024-01-08 RX ORDER — ALBUTEROL SULFATE 90 UG/1
2 AEROSOL, METERED RESPIRATORY (INHALATION) EVERY 4 HOURS PRN
Qty: 6.7 G | Refills: 0 | Status: SHIPPED | OUTPATIENT
Start: 2024-01-08 | End: 2024-02-05

## 2024-01-22 DIAGNOSIS — G47.00 INSOMNIA, UNSPECIFIED TYPE: ICD-10-CM

## 2024-01-22 RX ORDER — TRAZODONE HYDROCHLORIDE 50 MG/1
TABLET, FILM COATED ORAL
Qty: 180 TABLET | Refills: 1 | Status: SHIPPED | OUTPATIENT
Start: 2024-01-22 | End: 2024-05-24

## 2024-02-05 ENCOUNTER — OFFICE VISIT (OUTPATIENT)
Dept: OBGYN | Facility: CLINIC | Age: 39
End: 2024-02-05
Payer: COMMERCIAL

## 2024-02-05 VITALS — WEIGHT: 182 LBS | DIASTOLIC BLOOD PRESSURE: 73 MMHG | BODY MASS INDEX: 29.81 KG/M2 | SYSTOLIC BLOOD PRESSURE: 120 MMHG

## 2024-02-05 DIAGNOSIS — D25.1 INTRAMURAL, SUBMUCOUS, AND SUBSEROUS LEIOMYOMA OF UTERUS: ICD-10-CM

## 2024-02-05 DIAGNOSIS — N93.9 ABNORMAL UTERINE BLEEDING (AUB): ICD-10-CM

## 2024-02-05 DIAGNOSIS — D25.0 INTRAMURAL, SUBMUCOUS, AND SUBSEROUS LEIOMYOMA OF UTERUS: ICD-10-CM

## 2024-02-05 DIAGNOSIS — D25.2 INTRAMURAL, SUBMUCOUS, AND SUBSEROUS LEIOMYOMA OF UTERUS: ICD-10-CM

## 2024-02-05 DIAGNOSIS — D50.8 OTHER IRON DEFICIENCY ANEMIA: Primary | ICD-10-CM

## 2024-02-05 DIAGNOSIS — J45.30 MILD PERSISTENT ASTHMA WITHOUT COMPLICATION: ICD-10-CM

## 2024-02-05 LAB
ERYTHROCYTE [DISTWIDTH] IN BLOOD BY AUTOMATED COUNT: 19.8 % (ref 10–15)
HCT VFR BLD AUTO: 34.7 % (ref 35–47)
HGB BLD-MCNC: 10.9 G/DL (ref 11.7–15.7)
MCH RBC QN AUTO: 26.2 PG (ref 26.5–33)
MCHC RBC AUTO-ENTMCNC: 31.4 G/DL (ref 31.5–36.5)
MCV RBC AUTO: 83 FL (ref 78–100)
PLATELET # BLD AUTO: 434 10E3/UL (ref 150–450)
RBC # BLD AUTO: 4.16 10E6/UL (ref 3.8–5.2)
WBC # BLD AUTO: 4.7 10E3/UL (ref 4–11)

## 2024-02-05 PROCEDURE — 85027 COMPLETE CBC AUTOMATED: CPT | Performed by: OBSTETRICS & GYNECOLOGY

## 2024-02-05 PROCEDURE — 36415 COLL VENOUS BLD VENIPUNCTURE: CPT | Performed by: OBSTETRICS & GYNECOLOGY

## 2024-02-05 PROCEDURE — 99214 OFFICE O/P EST MOD 30 MIN: CPT | Performed by: OBSTETRICS & GYNECOLOGY

## 2024-02-05 RX ORDER — NORETHINDRONE ACETATE 5 MG
5 TABLET ORAL DAILY
Qty: 90 TABLET | Refills: 3 | Status: SHIPPED | OUTPATIENT
Start: 2024-02-05

## 2024-02-05 RX ORDER — ALBUTEROL SULFATE 90 UG/1
2 AEROSOL, METERED RESPIRATORY (INHALATION) EVERY 4 HOURS PRN
Qty: 6.7 G | Refills: 0 | Status: SHIPPED | OUTPATIENT
Start: 2024-02-05 | End: 2024-05-06

## 2024-02-05 NOTE — PATIENT INSTRUCTIONS
Plan to use the norethindrone once daily, but reach out if this is not effective and we can go to twice daily dosing.                 If you have labs or imaging done, the results will automatically release in [employer] without an interpretation.  Your health care professional will review those results and send an interpretation with recommendations as soon as possible, but this may be 1-3 business days.    If you have any questions regarding your visit, please contact your care team.     [employer] Access Services: [phone]  [facility] CLINIC HOURS TELEPHONE NUMBER       MD Renu Hernandez - Certified Medical Assistant     Evelyn MATOS RN  Jane-PRERNA Martinez-PRERNA Ennis-  Indu-     Monday- [place]  8:00 a.m - 5:00 p.m    Tuesday- [facility]        [facility]  8[facility] - 5:00 p.m.    Friday- [place]  7:30 a.m - 4:00 p.m. [facility]  [address]  [place], [place] [address] [phone] Fax  [phone] Phone  Imaging Scheduling [phone]    [facility] Labor and Delivery  [address] [place] [address] [address].  [place], [place] [address] [phone]    [address] [address] [phone] Phone  [phone] Fax  Imaging Scheduling [phone]     Urgent Care locations:  [facility] Monday-Friday  10 am - 8 pm  Saturday and Sunday   9 am - 5 pm  Monday-Friday   10 am - 8 pm  Saturday and Sunday   9 am - 5 pm   [phone]     **Surgeries** Our Surgery Schedulers will contact you to schedule. If you do not receive a call within 3 business days, please call [phone].    If you need a medication refill, please contact your pharmacy. Please allow 3 business days for your refill to be completed.    As always, thank you for trusting us with your healthcare needs!

## 2024-02-05 NOTE — PROGRESS NOTES
OB/GYN      NAME:  Ottoniel Mccormack  PCP:  Perla Nieto  MRN:  [id number]    Impression / Plan     38 year old  with:      ICD-10-CM    1. Other iron deficiency anemia  D50.8 CBC with platelets     CBC with platelets      2. Intramural, submucous, and subserous leiomyoma of uterus  D25.1 norethindrone (AYGESTIN) 5 MG tablet    D25.0     D25.2       3. Abnormal uterine bleeding (AUB)  N93.9           Discussed management options for abnormal uterine bleeding related to fibroids. She would like to restart the norethindrone.  She will reach out if her symptoms do not improve. Follow up as needed.     Chief Complaint     Chief Complaint   Patient presents with    Follow Up       HPI     Ottoniel Mccormack is a  38 year old female who is seen for follow up.  She had uterine artery embolization at [facility].  Notes reviewed.  She also received a blood transfusion at that time. Hgb of 6.5 on pre-procedural labs.  She was transfused 1 unit pRBC, with appropriate increase in Hgb to 7.7.   Office hysteroscopy was done at [facility] 11/15/23.  Small submucosal fibroid that was not amenable to removal due to distortion and length of the endometrial cavity.  She was told she may pass the small fibroid due to the recent UAE.    Patient's last menstrual period was 01/15/2024 (approximate). She has been using an ultra tampon every two hours for the first two days during her cycle when it is heavy.      Has a little unscheduled bleeding last week    Wondering if she can go back on the norethindrone to help with the bleeding.  She was told it may take months before she feels the full effects of the embolization. Her symptoms have improved/resolved since the procedure.  Bulk and pressure gone.  Bloating better. Only lingering symptoms is the bleeding. Maybe a little less bleeding, but not much improvement.    History significant for stroke of unknown etiology.  She uses daily asa otherwise no meds.     In review, I saw  the patient 11/28/2022 for uterine fibroids and abnormal bleeding. Patient desired future pregnancy and uterine preservation. MRI demonstrated a number of uterine fibroids. She was seen at [facility] to discuss management options and opted for UAE. I last saw the patient 7/24/23 for abnormal uterine bleeding.  endometrial biopsy done at that time benign.     Problem List     Patient Active Problem List    Diagnosis Date Noted    Intramural, submucous, and subserous leiomyoma of uterus 07/26/2023     Priority: Medium    Chronic endometritis 07/26/2023     Priority: Medium     Treated with doxy 7/26/23      H/O ischemic right MCA stroke 06/29/2023     Priority: Medium    Acute ischemic right MCA stroke (H) 06/29/2023     Priority: Medium    Mild persistent asthma without complication 06/29/2023     Priority: Medium    Shellfish allergy 06/29/2023     Priority: Medium    Migraine without aura and without status migrainosus, not intractable 06/29/2023     Priority: Medium    Iron deficiency anemia due to chronic blood loss 01/11/2023     Priority: Medium    Major depression, recurrent (H24) 11/01/2022     Priority: Medium    Cervical cancer screening 07/01/2016     Priority: Medium     2013 NILM  2016 ASCUS, Positive for High Risk HPV type 16  30 y.o.  7/16 colpo: TARA 2  8/15/16 LEEP: TARA 3 with + endocervical margins  7/2017 NILM, hpv negative 32 y.o.  3/2019 NILM Negative HPV 33 y.o.PLAN, per ASCCP Guidelines:cotest 3/2022  6/17/22 NIL pap, Neg HPV. Plan: cotest in 3 years    *After excision or ablation for TARA 2+, cotest in 6 months, then cotest annually x 3, then cotest every 3 years for at least 25 years. As cervical cancer risk remains above general population levels, continued screening for as long as the pt remains in good health is acceptable. (2019 ASCCP guideline).  Any abnormal pap or + HR HPV test within the 25 years warrants a colposcopy.* (2019 ASCCP advisor answer).             Medications     Current  Outpatient Medications   Medication    albuterol (PROAIR HFA/PROVENTIL HFA/VENTOLIN HFA) 108 (90 Base) MCG/ACT inhaler    aspirin (ASA) 81 MG chewable tablet    augmented betamethasone dipropionate (DIPROLENE AF) 0.05 % external cream    dupilumab (DUPIXENT) 300 MG/2ML prefilled pen    dupilumab (DUPIXENT) 300 MG/2ML prefilled pen    EPINEPHrine (ANY BX GENERIC EQUIV) 0.3 MG/0.3ML injection 2-pack    escitalopram (LEXAPRO) 20 MG tablet    montelukast (SINGULAIR) 10 MG tablet    QVAR REDIHALER 80 MCG/ACT inhaler    traZODone (DESYREL) 50 MG tablet    VITAMIN D PO    dupilumab (DUPIXENT) 300 MG/2ML prefilled pen    dupilumab (DUPIXENT) 300 MG/2ML prefilled pen    ferrous sulfate (FEROSUL) 325 (65 Fe) MG tablet    fluticasone (FLONASE) 50 MCG/ACT nasal spray    norethindrone (AYGESTIN) 5 MG tablet    SUMAtriptan (IMITREX) 50 MG tablet    tacrolimus (PROTOPIC) 0.1 % external ointment     No current facility-administered medications for this visit.        Allergies     Allergies   Allergen Reactions    Fish Oil Anaphylaxis    Shellfish Allergy Hives       ROS     Pertinent positives and negatives are listed in the HPI.     Physical Exam   Vitals: /73   Wt 82.6 kg (182 lb)   LMP 01/15/2024 (Approximate)   BMI 29.81 kg/m      General: Comfortable, no obvious distress.   : deferred    Labs/Imaging       I have personally reviewed the labs/imaging and the findings were:    11/9/23: hgb 6.6  11/10/23 hgb 7.7  11/15/23: hgb 8.1    7/24/23:  Final Diagnosis   Endometrium, biopsy:  - Chronic non-specific endometritis  - Inactive endometrial glands and decidualized stroma, consistent with exogenous progesterone effect   - Negative for atypia, hyperplasia or malignancy       30 min spent on the date of the encounter in chart review, patient visit, review of tests, documentation about the issues documented above.     Lamar Hernandez MD

## 2024-05-06 DIAGNOSIS — J45.30 MILD PERSISTENT ASTHMA WITHOUT COMPLICATION: ICD-10-CM

## 2024-05-06 RX ORDER — ALBUTEROL SULFATE 90 UG/1
2 AEROSOL, METERED RESPIRATORY (INHALATION) EVERY 4 HOURS PRN
Qty: 18 G | Refills: 0 | Status: SHIPPED | OUTPATIENT
Start: 2024-05-06 | End: 2024-05-24

## 2024-05-14 ENCOUNTER — TELEPHONE (OUTPATIENT)
Dept: DERMATOLOGY | Facility: CLINIC | Age: 39
End: 2024-05-14
Payer: COMMERCIAL

## 2024-05-14 NOTE — TELEPHONE ENCOUNTER
Prior Authorization Specialty Medication Request    Medication/Dose: Dupixant 300 mg/2ml Pen-injectors has beeen denied  Diagnosis and ICD code (if different than what is on RX):    New/renewal/insurance change PA/secondary ins. PA:  Previously Tried and Failed:      Important Lab Values:   Rationale:     Insurance   Primary:   Insurance ID:      Secondary (if applicable):Insurance ID:      Pharmacy Information (if different than what is on RX)  Name:    Phone:    Fax:

## 2024-05-14 NOTE — TELEPHONE ENCOUNTER
[facility] Call Center    Phone Message    May a detailed message be left on voicemail: no     Reason for Call: Other: [facility] called to see why they received a fax from us. Please call [facility] to verify where that needs to go.      Action Taken: Other: [facility]    Travel Screening: Not Applicable

## 2024-05-15 NOTE — TELEPHONE ENCOUNTER
Patient Contact    Attempt # 1    Was call answered?  Yes    Called [facility] Department back. They are unsure of was faxed as they have no documentation. They will look more into it. Writer gave our fax number and call back number if they find anything.     Che Duran LPN   [facility]   [phone]

## 2024-05-17 NOTE — TELEPHONE ENCOUNTER
Adri from [facility] called re PA for Dupixent - they need the critical criteria questions filled out and sent back or it can be answered verbally on the phone [phone] Ref # [id number]  Thank you

## 2024-05-20 NOTE — TELEPHONE ENCOUNTER
Sent [employer] message to patient May14  since PA form wanted assessment on her improvement and we have no documentation after skin was flaring after inconsistent use of dupixent in Oct 2023. She has not read my message.   Can you call her to see if her eczema is improving?     Does this also need to be sent to our pharmacy liaisons. .

## 2024-05-20 NOTE — TELEPHONE ENCOUNTER
Patient Contact    Attempt # 1    Was call answered?  No    Left a voicemail asking patient to give us a call back at our main dermatology line at [phone]    Alicia COREA RN BSN  [facility]  [phone]

## 2024-05-22 NOTE — TELEPHONE ENCOUNTER
Returning missed call. No answer, Left a voicemail asking patient to give us a call back at our main dermatology line at [phone]    Alicia COREA RN BSN  [facility]  [phone]

## 2024-05-22 NOTE — TELEPHONE ENCOUNTER
Patient Contact    Attempt # 2    Was call answered?  No    Left a voicemail asking patient to give us a call back at our main dermatology line at [phone]    Alicia COREA RN BSN  [facility]  [phone]

## 2024-05-22 NOTE — TELEPHONE ENCOUNTER
Spoke with patient, she states eczema is good, she is doing very well. Appearance has improved almost 100% but still has itching and some discoloration.    Patient has questions regarding dupixent:   - She has been back on dupixent for about 6 months, patient asks when does the medication enter the bloodstream to the point of her not needing to take the medication anymore. She is experiencing intermittent itching.    -She would like to know how long patient can expect to be on this medication, is this a lifelong medication or does the itching ever go away.     -She wants to know if people still experience triggers/flares even when on the medication, even when not missing or late on doses.       Routing to provider please advise.     Alicia COREA RN BSN  [facility]  [phone]

## 2024-05-24 ENCOUNTER — OFFICE VISIT (OUTPATIENT)
Dept: FAMILY MEDICINE | Facility: CLINIC | Age: 39
End: 2024-05-24
Payer: COMMERCIAL

## 2024-05-24 VITALS
HEART RATE: 88 BPM | WEIGHT: 187.5 LBS | DIASTOLIC BLOOD PRESSURE: 84 MMHG | RESPIRATION RATE: 20 BRPM | SYSTOLIC BLOOD PRESSURE: 116 MMHG | TEMPERATURE: 98.9 F | OXYGEN SATURATION: 99 % | BODY MASS INDEX: 30.13 KG/M2 | HEIGHT: 66 IN

## 2024-05-24 DIAGNOSIS — J45.30 MILD PERSISTENT ASTHMA WITHOUT COMPLICATION: ICD-10-CM

## 2024-05-24 DIAGNOSIS — R20.2 NUMBNESS AND TINGLING IN RIGHT HAND: ICD-10-CM

## 2024-05-24 DIAGNOSIS — Z13.21 ENCOUNTER FOR VITAMIN DEFICIENCY SCREENING: ICD-10-CM

## 2024-05-24 DIAGNOSIS — F32.1 MODERATE MAJOR DEPRESSION (H): ICD-10-CM

## 2024-05-24 DIAGNOSIS — R20.0 NUMBNESS AND TINGLING IN RIGHT HAND: ICD-10-CM

## 2024-05-24 DIAGNOSIS — Z13.1 SCREENING FOR DIABETES MELLITUS: ICD-10-CM

## 2024-05-24 DIAGNOSIS — Z13.220 SCREENING FOR HYPERLIPIDEMIA: ICD-10-CM

## 2024-05-24 DIAGNOSIS — Z86.73 H/O ISCHEMIC RIGHT MCA STROKE: ICD-10-CM

## 2024-05-24 DIAGNOSIS — G47.00 INSOMNIA, UNSPECIFIED TYPE: ICD-10-CM

## 2024-05-24 DIAGNOSIS — F41.9 ANXIETY: ICD-10-CM

## 2024-05-24 DIAGNOSIS — Z13.0 SCREENING FOR DEFICIENCY ANEMIA: ICD-10-CM

## 2024-05-24 DIAGNOSIS — Z12.31 VISIT FOR SCREENING MAMMOGRAM: ICD-10-CM

## 2024-05-24 DIAGNOSIS — Z00.00 ROUTINE GENERAL MEDICAL EXAMINATION AT A HEALTH CARE FACILITY: Primary | ICD-10-CM

## 2024-05-24 DIAGNOSIS — F43.10 PTSD (POST-TRAUMATIC STRESS DISORDER): ICD-10-CM

## 2024-05-24 DIAGNOSIS — J30.9 ALLERGIC RHINITIS, UNSPECIFIED SEASONALITY, UNSPECIFIED TRIGGER: ICD-10-CM

## 2024-05-24 LAB
BASOPHILS # BLD AUTO: 0.1 10E3/UL (ref 0–0.2)
BASOPHILS NFR BLD AUTO: 1 %
EOSINOPHIL # BLD AUTO: 0.3 10E3/UL (ref 0–0.7)
EOSINOPHIL NFR BLD AUTO: 4 %
ERYTHROCYTE [DISTWIDTH] IN BLOOD BY AUTOMATED COUNT: 14.8 % (ref 10–15)
HCT VFR BLD AUTO: 39.1 % (ref 35–47)
HGB BLD-MCNC: 12.6 G/DL (ref 11.7–15.7)
IMM GRANULOCYTES # BLD: 0 10E3/UL
IMM GRANULOCYTES NFR BLD: 0 %
LYMPHOCYTES # BLD AUTO: 1.2 10E3/UL (ref 0.8–5.3)
LYMPHOCYTES NFR BLD AUTO: 18 %
MCH RBC QN AUTO: 29.9 PG (ref 26.5–33)
MCHC RBC AUTO-ENTMCNC: 32.2 G/DL (ref 31.5–36.5)
MCV RBC AUTO: 93 FL (ref 78–100)
MONOCYTES # BLD AUTO: 0.7 10E3/UL (ref 0–1.3)
MONOCYTES NFR BLD AUTO: 11 %
NEUTROPHILS # BLD AUTO: 4.2 10E3/UL (ref 1.6–8.3)
NEUTROPHILS NFR BLD AUTO: 66 %
PLATELET # BLD AUTO: 429 10E3/UL (ref 150–450)
RBC # BLD AUTO: 4.22 10E6/UL (ref 3.8–5.2)
WBC # BLD AUTO: 6.4 10E3/UL (ref 4–11)

## 2024-05-24 PROCEDURE — 99395 PREV VISIT EST AGE 18-39: CPT | Performed by: PHYSICIAN ASSISTANT

## 2024-05-24 PROCEDURE — 80053 COMPREHEN METABOLIC PANEL: CPT | Performed by: PHYSICIAN ASSISTANT

## 2024-05-24 PROCEDURE — 82306 VITAMIN D 25 HYDROXY: CPT | Performed by: PHYSICIAN ASSISTANT

## 2024-05-24 PROCEDURE — 85025 COMPLETE CBC W/AUTO DIFF WBC: CPT | Performed by: PHYSICIAN ASSISTANT

## 2024-05-24 PROCEDURE — 80061 LIPID PANEL: CPT | Performed by: PHYSICIAN ASSISTANT

## 2024-05-24 PROCEDURE — 99213 OFFICE O/P EST LOW 20 MIN: CPT | Mod: 25 | Performed by: PHYSICIAN ASSISTANT

## 2024-05-24 PROCEDURE — 36415 COLL VENOUS BLD VENIPUNCTURE: CPT | Performed by: PHYSICIAN ASSISTANT

## 2024-05-24 RX ORDER — ESCITALOPRAM OXALATE 20 MG/1
20 TABLET ORAL DAILY
Qty: 90 TABLET | Refills: 1 | Status: SHIPPED | OUTPATIENT
Start: 2024-05-24

## 2024-05-24 RX ORDER — FLUTICASONE PROPIONATE 50 MCG
1 SPRAY, SUSPENSION (ML) NASAL DAILY
Qty: 16 G | Refills: 3 | Status: SHIPPED | OUTPATIENT
Start: 2024-05-24 | End: 2024-08-20

## 2024-05-24 RX ORDER — ALBUTEROL SULFATE 90 UG/1
2 AEROSOL, METERED RESPIRATORY (INHALATION) EVERY 4 HOURS PRN
Qty: 18 G | Refills: 0 | Status: SHIPPED | OUTPATIENT
Start: 2024-05-24 | End: 2024-07-02

## 2024-05-24 RX ORDER — TRAZODONE HYDROCHLORIDE 50 MG/1
TABLET, FILM COATED ORAL
Qty: 180 TABLET | Refills: 1 | Status: SHIPPED | OUTPATIENT
Start: 2024-05-24

## 2024-05-24 RX ORDER — BECLOMETHASONE DIPROPIONATE HFA 80 UG/1
1 AEROSOL, METERED RESPIRATORY (INHALATION) 2 TIMES DAILY
Qty: 30.8 G | Refills: 1 | Status: SHIPPED | OUTPATIENT
Start: 2024-05-24

## 2024-05-24 RX ORDER — MONTELUKAST SODIUM 10 MG/1
TABLET ORAL
Qty: 90 TABLET | Refills: 1 | Status: SHIPPED | OUTPATIENT
Start: 2024-05-24

## 2024-05-24 RX ORDER — UBROGEPANT 100 MG/1
100 TABLET ORAL DAILY PRN
COMMUNITY
Start: 2024-05-13

## 2024-05-24 SDOH — HEALTH STABILITY: PHYSICAL HEALTH: ON AVERAGE, HOW MANY DAYS PER WEEK DO YOU ENGAGE IN MODERATE TO STRENUOUS EXERCISE (LIKE A BRISK WALK)?: 3 DAYS

## 2024-05-24 ASSESSMENT — ANXIETY QUESTIONNAIRES
1. FEELING NERVOUS, ANXIOUS, OR ON EDGE: NOT AT ALL
IF YOU CHECKED OFF ANY PROBLEMS ON THIS QUESTIONNAIRE, HOW DIFFICULT HAVE THESE PROBLEMS MADE IT FOR YOU TO DO YOUR WORK, TAKE CARE OF THINGS AT HOME, OR GET ALONG WITH OTHER PEOPLE: NOT DIFFICULT AT ALL
2. NOT BEING ABLE TO STOP OR CONTROL WORRYING: NOT AT ALL
6. BECOMING EASILY ANNOYED OR IRRITABLE: NOT AT ALL
GAD7 TOTAL SCORE: 1
GAD7 TOTAL SCORE: 1
3. WORRYING TOO MUCH ABOUT DIFFERENT THINGS: NOT AT ALL
7. FEELING AFRAID AS IF SOMETHING AWFUL MIGHT HAPPEN: SEVERAL DAYS
5. BEING SO RESTLESS THAT IT IS HARD TO SIT STILL: NOT AT ALL

## 2024-05-24 ASSESSMENT — PATIENT HEALTH QUESTIONNAIRE - PHQ9
5. POOR APPETITE OR OVEREATING: NOT AT ALL
10. IF YOU CHECKED OFF ANY PROBLEMS, HOW DIFFICULT HAVE THESE PROBLEMS MADE IT FOR YOU TO DO YOUR WORK, TAKE CARE OF THINGS AT HOME, OR GET ALONG WITH OTHER PEOPLE: NOT DIFFICULT AT ALL
SUM OF ALL RESPONSES TO PHQ QUESTIONS 1-9: 0
SUM OF ALL RESPONSES TO PHQ QUESTIONS 1-9: 0

## 2024-05-24 ASSESSMENT — ASTHMA QUESTIONNAIRES
QUESTION_3 LAST FOUR WEEKS HOW OFTEN DID YOUR ASTHMA SYMPTOMS (WHEEZING, COUGHING, SHORTNESS OF BREATH, CHEST TIGHTNESS OR PAIN) WAKE YOU UP AT NIGHT OR EARLIER THAN USUAL IN THE MORNING: NOT AT ALL
QUESTION_2 LAST FOUR WEEKS HOW OFTEN HAVE YOU HAD SHORTNESS OF BREATH: ONCE OR TWICE A WEEK
QUESTION_5 LAST FOUR WEEKS HOW WOULD YOU RATE YOUR ASTHMA CONTROL: WELL CONTROLLED
ACT_TOTALSCORE: 21
QUESTION_4 LAST FOUR WEEKS HOW OFTEN HAVE YOU USED YOUR RESCUE INHALER OR NEBULIZER MEDICATION (SUCH AS ALBUTEROL): TWO OR THREE TIMES PER WEEK
ACT_TOTALSCORE: 21
QUESTION_1 LAST FOUR WEEKS HOW MUCH OF THE TIME DID YOUR ASTHMA KEEP YOU FROM GETTING AS MUCH DONE AT WORK, SCHOOL OR AT HOME: NONE OF THE TIME

## 2024-05-24 ASSESSMENT — SOCIAL DETERMINANTS OF HEALTH (SDOH): HOW OFTEN DO YOU GET TOGETHER WITH FRIENDS OR RELATIVES?: ONCE A WEEK

## 2024-05-24 NOTE — PATIENT INSTRUCTIONS
"I will notify you of lab results via [employer]   Continue medications as you have been taking.  Follow up with us in 6 months virtual visit   Schedule your mammogram at [facility] ([phone]) once turn 39   Schedule EMG for right hand numbness at [facility] ([phone]) formerly called [facility].      Patient Education    Preventive Care Advice   This is general advice we often give to help people stay healthy. Your care team may have specific advice just for you. Please talk to your care team about your own preventive care needs.  Lifestyle  Exercise at least 150 minutes each week (30 minutes a day, 5 days a week).  Do muscle strengthening activities 2 days a week. These help control your weight and prevent disease.  No smoking.  Wear sunscreen to prevent skin cancer.  Have your home tested for radon every 2 to 5 years. Radon is a colorless, odorless gas that can harm your lungs. To learn more, go to www.health.[facility].mn. and search for \"Radon in Homes.\"  Keep guns unloaded and locked up in a safe place like a safe or gun vault, or, use a gun lock and hide the keys. Always lock away bullets separately. To learn more, visit [employer].mn.gov and search for \"safe gun storage.\"  Nutrition  Eat 5 or more servings of fruits and vegetables each day.  Try wheat bread, brown rice and whole grain pasta (instead of white bread, rice, and pasta).  Get enough calcium and vitamin D. Check the label on foods and aim for 100% of the RDA (recommended daily allowance).  Regular exams  Have a dental exam and cleaning every 6 months.  See your health care team every year to talk about:  Any changes in your health.  Any medicines your care team has prescribed.  Preventive care, family planning, and ways to prevent chronic diseases.  Shots (vaccines)   HPV shots (up to age 26), if you've never had them before.  Hepatitis B shots (up to age 59), if you've never had " them before.  COVID-19 shot: Get this shot when it's due.  Flu shot: Get a flu shot every year.  Tetanus shot: Get a tetanus shot every 10 years.  Pneumococcal, hepatitis A, and RSV shots: Ask your care team if you need these based on your risk.  Shingles shot (for age 50 and up).  General health tests  Diabetes screening:  Starting at age 35, Get screened for diabetes at least every 3 years.  If you are younger than age 35, ask your care team if you should be screened for diabetes.  Cholesterol test: At age 39, start having a cholesterol test every 5 years, or more often if advised.  Bone density scan (DEXA): At age 50, ask your care team if you should have this scan for osteoporosis (brittle bones).  Hepatitis C: Get tested at least once in your life.  Abdominal aortic aneurysm screening: Talk to your doctor about having this screening if you:  Have ever smoked; and  Are biologically male; and  Are between the ages of 65 and 75.  STIs (sexually transmitted infections)  Before age 24: Ask your care team if you should be screened for STIs.  After age 24: Get screened for STIs if you're at risk. You are at risk for STIs (including HIV) if:  You are sexually active with more than one person.  You don't use condoms every time.  You or a partner was diagnosed with a sexually transmitted infection.  If you are at risk for HIV, ask about PrEP medicine to prevent HIV.  Get tested for HIV at least once in your life, whether you are at risk for HIV or not.  Cancer screening tests  Cervical cancer screening: If you have a cervix, begin getting regular cervical cancer screening tests at age 21. Most people who have regular screenings with normal results can stop after age 65. Talk about this with your provider.  Breast cancer scan (mammogram): If you've ever had breasts, begin having regular mammograms starting at age 40. This is a scan to check for breast cancer.  Colon cancer screening: It is important to start screening for  colon cancer at age 45.  Have a colonoscopy test every 10 years (or more often if you're at risk) Or, ask your provider about stool tests like a FIT test every year or Cologuard test every 3 years.  To learn more about your testing options, visit: www.[employer]/840725.pdf.  For help making a decision, visit: [email or web address].  Prostate cancer screening test: If you have a prostate and are age 55 to 69, ask your provider if you would benefit from a yearly prostate cancer screening test.  Lung cancer screening: If you are a current or former smoker age 50 to 80, ask your care team if ongoing lung cancer screenings are right for you.  For informational purposes only. Not to replace the advice of your health care provider. Copyright   2023 [facility] [employer]. All rights reserved. Clinically reviewed by the [facility] Transitions Program. [employer] [id number] - REV 04/24.    Learning About Stress  What is stress?     Stress is your body's response to a hard situation. Your body can have a physical, emotional, or mental response. Stress is a fact of life for most people, and it affects everyone differently. What causes stress for you may not be stressful for someone else.  A lot of things can cause stress. You may feel stress when you go on a job interview, take a test, or run a race. This kind of short-term stress is normal and even useful. It can help you if you need to work hard or react quickly. For example, stress can help you finish an important job on time.  Long-term stress is caused by ongoing stressful situations or events. Examples of long-term stress include long-term health problems, ongoing problems at work, or conflicts in your family. Long-term stress can harm your health.  How does stress affect your health?  When you are stressed, your body responds as though you are in danger. It makes hormones that speed up your heart, make you breathe faster, and give you a burst of energy. This is called  the fight-or-flight stress response. If the stress is over quickly, your body goes back to normal and no harm is done.  But if stress happens too often or lasts too long, it can have bad effects. Long-term stress can make you more likely to get sick, and it can make symptoms of some diseases worse. If you tense up when you are stressed, you may develop neck, shoulder, or low back pain. Stress is linked to high blood pressure and heart disease.  Stress also harms your emotional health. It can make you ortiz, tense, or depressed. Your relationships may suffer, and you may not do well at work or school.  What can you do to manage stress?  You can try these things to help manage stress:   Do something active. Exercise or activity can help reduce stress. Walking is a great way to get started. Even everyday activities such as housecleaning or yard work can help.  Try yoga or glendy chi. These techniques combine exercise and meditation. You may need some training at first to learn them.  Do something you enjoy. For example, listen to music or go to a movie. Practice your hobby or do volunteer work.  Meditate. This can help you relax, because you are not worrying about what happened before or what may happen in the future.  Do guided imagery. Imagine yourself in any setting that helps you feel calm. You can use online videos, books, or a teacher to guide you.  Do breathing exercises. For example:  From a standing position, bend forward from the waist with your knees slightly bent. Let your arms dangle close to the floor.  Breathe in slowly and deeply as you return to a standing position. Roll up slowly and lift your head last.  Hold your breath for just a few seconds in the standing position.  Breathe out slowly and bend forward from the waist.  Let your feelings out. Talk, laugh, cry, and express anger when you need to. Talking with supportive friends or family, a counselor, or a live leader about your feelings is a healthy  "way to relieve stress. Avoid discussing your feelings with people who make you feel worse.  Write. It may help to write about things that are bothering you. This helps you find out how much stress you feel and what is causing it. When you know this, you can find better ways to cope.  What can you do to prevent stress?  You might try some of these things to help prevent stress:  Manage your time. This helps you find time to do the things you want and need to do.  Get enough sleep. Your body recovers from the stresses of the day while you are sleeping.  Get support. Your family, friends, and community can make a difference in how you experience stress.  Limit your news feed. Avoid or limit time on social media or news that may make you feel stressed.  Do something active. Exercise or activity can help reduce stress. Walking is a great way to get started.  Where can you learn more?  Go to https://www.[employer].net/patiented  Enter N032 in the search box to learn more about \"Learning About Stress.\"  Current as of: October 24, 2023               Content Version: 14.0    1976-1419 [employer]   Care instructions adapted under license by your healthcare professional. If you have questions about a medical condition or this instruction, always ask your healthcare professional. [employer] disclaims any warranty or liability for your use of this information.      "

## 2024-05-24 NOTE — PROGRESS NOTES
Preventive Care Visit  [facility]  Perla Nieto PA-C, Family Medicine  May 24, 2024      Assessment & Plan     Routine general medical examination at a health care facility  Benign exam except obesity  Declines PCV 20 vaccine declines covid vaccine     H/) ischemic right MCA stroke  Is followed by neurology- have not recommended statin to her knowledge    Mild persistent asthma without complication  Symptoms controlled with current therapy - follow up with us in 6 months   - albuterol (PROAIR HFA/PROVENTIL HFA/VENTOLIN HFA) 108 (90 Base) MCG/ACT inhaler  Dispense: 18 g; Refill: 0  - QVAR REDIHALER 80 MCG/ACT inhaler  Dispense: 30.8 g; Refill: 1  - montelukast (SINGULAIR) 10 MG tablet  Dispense: 90 tablet; Refill: 1    Anxiety  Symptoms controlled with current therapy- follow up with us in 6 months   - escitalopram (LEXAPRO) 20 MG tablet  Dispense: 90 tablet; Refill: 1    PTSD (post-traumatic stress disorder)  Symptoms controlled with current therapy   - escitalopram (LEXAPRO) 20 MG tablet  Dispense: 90 tablet; Refill: 1    Moderate major depression (H)  Symptoms controlled with current therapy- follow up with us in 6  months   - escitalopram (LEXAPRO) 20 MG tablet  Dispense: 90 tablet; Refill: 1    Allergic rhinitis, unspecified seasonality, unspecified trigger    - fluticasone (FLONASE) 50 MCG/ACT nasal spray  Dispense: 16 g; Refill: 3    Insomnia, unspecified type  100mg too sedating during the week- takes 100mg on weekends and 50 mg during the week  - traZODone (DESYREL) 50 MG tablet  Dispense: 180 tablet; Refill: 1    Numbness and tingling in right hand  Will evaluate with EMG   - EMG    Visit for screening mammogram    - MA Screen Bilateral w/[facility]    Screening for deficiency anemia  Has been very anemic in past from uterine fibroids  - CBC with platelets and differential  - CBC with platelets and differential    Screening for hyperlipidemia    - Lipid panel reflex to direct LDL  "Non-fasting  - Lipid panel reflex to direct LDL Non-fasting    Encounter for vitamin deficiency screening    - 25 OH Vit D therapy monitoring  - 25 OH Vit D therapy monitoring    Screening for diabetes mellitus    - Comprehensive metabolic panel (BMP + Alb, Alk Phos, ALT, AST, Total. Bili, TP)  - Comprehensive metabolic panel (BMP + Alb, Alk Phos, ALT, AST, Total. Bili, TP)              BMI  Estimated body mass index is 30.26 kg/m  as calculated from the following:    Height as of this encounter: 1.676 m (5' 6\").    Weight as of this encounter: 85 kg (187 lb 8 oz).   Weight management plan: Discussed healthy diet and exercise guidelines    Counseling  Appropriate preventive services were discussed with this patient, including applicable screening as appropriate for fall prevention, nutrition, physical activity, Tobacco-use cessation, weight loss and cognition.  Checklist reviewing preventive services available has been given to the patient.  Reviewed patient's diet, addressing concerns and/or questions.   She is at risk for lack of exercise and has been provided with information to increase physical activity for the benefit of her well-being.   She is at risk for psychosocial distress and has been provided with information to reduce risk.       I will notify you of lab results via [employer]   Continue medications as you have been taking.  Follow up with us in 6 months virtual visit   Schedule your mammogram at [facility] ([phone]) once turn 39   Schedule EMG for right hand numbness at [facility] ([phone]) formerly called [facility].     Sheri Alcazar is a 38 year old, presenting for the following:  Physical        5/24/2024     4:17 PM   Additional Questions   Roomed by Renetta        Health Care Directive  Patient does not have a Health Care Directive or Living Will: Discussed advance care planning with patient; information given " to patient to review.    HPI  Patient known to me with history of strokes, uterine fibroids, anxiety, depression, PTSD, migraines, asthma and seasonal allergies presents for annual exam.   Allergies are really bothersome right now-  History of severe anemia related to fibroids (requiring transfusions)- surgery 6 months ago and improved but unable to remove one of fibroids so still some bleeding -so on norethindrone per gynecology  Sees neurology every 6 months (history of migraines-every 3 months) also strokes related to PFO and medications for uterine fibroids (transexamic acid)   No issues for two years. Reports never advised to take statin. Is supposed to have pfo closed but has not followed up.  Anxiety and depression are well controlled with current therapy  On weekends takes 100 mg trazodone-during week one    Concerns for numbness of right hand middle ring and little fingers. Is right hand dominant.  Wearing splint at night with some relief but still bothersome   Right hand dominant    and at computer all day  Numbness of hand Worse at night       Depression and Anxiety   How are you doing with your depression since your last visit? No change  How are you doing with your anxiety since your last visit?  No change  Are you having other symptoms that might be associated with depression or anxiety? No  Have you had a significant life event? No   Do you have any concerns with your use of alcohol or other drugs? No    Social History     Tobacco Use    Smoking status: Never    Smokeless tobacco: Never   Vaping Use    Vaping status: Never Used   Substance Use Topics    Alcohol use: Yes     Comment: once a week    Drug use: Never         11/1/2022    10:56 AM 6/29/2023     9:00 AM 5/24/2024     4:12 PM   PHQ   PHQ-9 Total Score 3 0 0   Q9: Thoughts of better off dead/self-harm past 2 weeks Not at all Not at all Not at all         6/17/2022    11:03 AM 6/24/2022    11:27 AM 5/24/2024     4:23 PM   [place]-7 SCORE    Total Score  7 (mild anxiety)    Total Score 11 7 1         5/24/2024     4:12 PM   Last PHQ-9   1.  Little interest or pleasure in doing things 0   2.  Feeling down, depressed, or hopeless 0   3.  Trouble falling or staying asleep, or sleeping too much 0   4.  Feeling tired or having little energy 0   5.  Poor appetite or overeating 0   6.  Feeling bad about yourself 0   7.  Trouble concentrating 0   8.  Moving slowly or restless 0   Q9: Thoughts of better off dead/self-harm past 2 weeks 0   PHQ-9 Total Score 0         5/24/2024     4:23 PM   [place]-7    1. Feeling nervous, anxious, or on edge 0   2. Not being able to stop or control worrying 0   3. Worrying too much about different things 0   4. Trouble relaxing 0   5. Being so restless that it is hard to sit still 0   6. Becoming easily annoyed or irritable 0   7. Feeling afraid, as if something awful might happen 1   [place]-7 Total Score 1   If you checked any problems, how difficult have they made it for you to do your work, take care of things at home, or get along with other people? Not difficult at all       Suicide Assessment Five-step Evaluation and Treatment (SAFE-T)    Asthma Follow-Up    Was ACT completed today?  Yes        5/24/2024     4:14 PM   ACT Total Scores   ACT TOTAL SCORE (Goal Greater than or Equal to 20) 21   In the past 12 months, how many times did you visit the emergency room for your asthma without being admitted to the hospital? 0   In the past 12 months, how many times were you hospitalized overnight because of your asthma? 0        How many days per week do you miss taking your asthma controller medication?  0  Please describe any recent triggers for your asthma: upper respiratory infections, dust mites, and pollens  Have you had any Emergency Room Visits, Urgent Care Visits, or Hospital Admissions since your last office visit?  No        5/24/2024   General Health   How would you rate your overall physical health? Good   Feel stress  (tense, anxious, or unable to sleep) Only a little   (!) STRESS CONCERN      5/24/2024   Nutrition   Three or more servings of calcium each day? Yes   Diet: Regular (no restrictions)   How many servings of fruit and vegetables per day? (!) 2-3   How many sweetened beverages each day? 0-1         5/24/2024   Exercise   Days per week of moderate/strenous exercise 3 days         5/24/2024   Social Factors   Frequency of gathering with friends or relatives Once a week   Worry food won't last until get money to buy more No   Food not last or not have enough money for food? No   Do you have housing?  Yes   Are you worried about losing your housing? No   Lack of transportation? No   Unable to get utilities (heat,electricity)? No         5/24/2024   Dental   Dentist two times every year? Yes         5/24/2024   TB Screening   Were you born outside of the [place]? No       Today's PHQ-9 Score:       5/24/2024     4:12 PM   PHQ-9 SCORE   PHQ-9 Total Score MyChart 0   PHQ-9 Total Score 0         5/24/2024   Substance Use   Alcohol more than 3/day or more than 7/wk No   Do you use any other substances recreationally? No     Social History     Tobacco Use    Smoking status: Never    Smokeless tobacco: Never   Vaping Use    Vaping status: Never Used   Substance Use Topics    Alcohol use: Yes     Comment: once a week    Drug use: Never             5/24/2024   Breast Cancer Screening   Family history of breast, colon, or ovarian cancer? No / Unknown      Mammogram Screening - Mammogram every 1-2 years updated in Health Maintenance based on mutual decision making        5/24/2024   STI Screening   New sexual partner(s) since last STI/HIV test? No     History of abnormal Pap smear: YES - reflected in Problem List and Health Maintenance accordingly        Latest Ref Rng & Units 6/17/2022    10:53 AM 3/4/2019    12:00 AM   PAP / HPV   PAP  Negative for Intraepithelial Lesion or Malignancy (NILM)     HPV 16 DNA Negative Negative     HPV 18  DNA Negative Negative     Other HR HPV Negative Negative     PAP-ABSTRACT   See Scanned Document           This result is from an external source.           5/24/2024   Contraception/Family Planning   Questions about contraception or family planning No        Reviewed and updated as needed this visit by Provider   Tobacco   Meds   Med Hx  Surg Hx  Fam Hx  Soc Hx Sexual Activity          BP Readings from Last 3 Encounters:   05/24/24 116/84   02/05/24 120/73   07/24/23 134/84    Wt Readings from Last 3 Encounters:   05/24/24 85 kg (187 lb 8 oz)   02/05/24 82.6 kg (182 lb)   07/24/23 82.6 kg (182 lb)                  Patient Active Problem List   Diagnosis    Cervical cancer screening    Major depression, recurrent (H24)    Iron deficiency anemia due to chronic blood loss    H/O ischemic right MCA stroke    Acute ischemic right MCA stroke (H)    Mild persistent asthma without complication    Shellfish allergy    Migraine without aura and without status migrainosus, not intractable    Intramural, submucous, and subserous leiomyoma of uterus    Chronic endometritis    Anxiety    PTSD (post-traumatic stress disorder)    Moderate major depression (H)    Allergic rhinitis, unspecified seasonality, unspecified trigger    Insomnia, unspecified type     Past Surgical History:   Procedure Laterality Date    HYSTEROSCOPY DIAGNOSTIC  11/15/2023    Office hysteroscopy at [place] [facility].  Small fibroid not amenable to removal    LEEP TX, CERVICAL      UTERINE ARTERY EMBOLIZATION  11/09/2023    Fibroid uterus, [facility]       Social History     Tobacco Use    Smoking status: Never    Smokeless tobacco: Never   Substance Use Topics    Alcohol use: Yes     Comment: once a week     Family History   Problem Relation Age of Onset    Hypertension Mother     Other - See Comments Father 67        Julia - thinks pancreatic cancer     No Known Problems Brother     Diabetes Maternal Grandmother         adult     No Known Problems Brother      No Known Problems Brother     No Known Problems Brother     No Known Problems Brother     Breast Cancer No family hx of     Colon Cancer No family hx of          Current Outpatient Medications   Medication Sig Dispense Refill    albuterol (PROAIR HFA/PROVENTIL HFA/VENTOLIN HFA) 108 (90 Base) MCG/ACT inhaler Inhale 2 puffs into the lungs every 4 hours as needed for shortness of breath, wheezing or cough 18 g 0    aspirin (ASA) 81 MG chewable tablet Take 81 mg by mouth      augmented betamethasone dipropionate (DIPROLENE AF) 0.05 % external cream Apply twice daily as needed. 50 g 6    dupilumab (DUPIXENT) 300 MG/2ML prefilled pen Inject 4 mLs (600 mg) Subcutaneous See Admin Instructions for 1 dose 4 mL 0    dupilumab (DUPIXENT) 300 MG/2ML prefilled pen Inject 2 mLs (300 mg) Subcutaneous every 14 days 4 mL 11    EPINEPHrine (ANY BX GENERIC EQUIV) 0.3 MG/0.3ML injection 2-pack Inject into the muscle as directed for anaphylaxis 2 each 1    escitalopram (LEXAPRO) 20 MG tablet Take 1 tablet (20 mg) by mouth daily 90 tablet 1    fluticasone (FLONASE) 50 MCG/ACT nasal spray Spray 1 spray into both nostrils daily 16 g 3    montelukast (SINGULAIR) 10 MG tablet TAKE 1 TABLET BY MOUTH EVERYDAY AT BEDTIME 90 tablet 1    norethindrone (AYGESTIN) 5 MG tablet Take 1 tablet (5 mg) by mouth daily 90 tablet 3    QVAR REDIHALER 80 MCG/ACT inhaler Inhale 1 puff into the lungs 2 times daily 30.8 g 1    tacrolimus (PROTOPIC) 0.1 % external ointment Apply twice daily as needed. 30 g 4    traZODone (DESYREL) 50 MG tablet TAKE 1 TO 2 TABLETS BY MOUTH AT BEDTIME AS NEEDED FOR SLEEP 180 tablet 1    UBRELVY 100 MG tablet Take 100 mg by mouth daily as needed      VITAMIN D PO            Review of Systems  CONSTITUTIONAL:NEGATIVE for fever, chills, change in weight  Wt Readings from Last 4 Encounters:   05/24/24 85 kg (187 lb 8 oz)   02/05/24 82.6 kg (182 lb)   07/24/23 82.6 kg (182 lb)   03/22/23 85.1 kg (187 lb 11.2 oz)       INTEGUMENTARY/SKIN:  "NEGATIVE for worrisome rashes, moles or lesions  EYES: NEGATIVE for vision changes or irritation  ENT/MOUTH: NEGATIVE for ear, mouth and throat problems  RESP:NEGATIVE for significant cough or SOB  BREAST: NEGATIVE for masses, tenderness or discharge  CV: NEGATIVE for chest pain, palpitations or peripheral edema  GI: NEGATIVE for nausea, abdominal pain, heartburn, or change in bowel habits  : NEGATIVE for frequency, dysuria, or hematuria  MUSCULOSKELETAL: NEGATIVE for significant arthralgias or myalgia- right hand without deformity- strength + 5/5- tinel test is positive but complains of numbness in ulnar nerve distribution  NEURO: see above -migraine every 3 months -followed by neurology   ENDOCRINE: NEGATIVE for temperature intolerance, skin/hair changes  HEME: NEGATIVE for bleeding problems  PSYCHIATRIC: symptoms controlled with current theapy      Objective    Exam  /84 (BP Location: Right arm, Patient Position: Sitting, Cuff Size: Adult Large)   Pulse 88   Temp 98.9  F (37.2  C) (Oral)   Resp 20   Ht 1.676 m (5' 6\")   Wt 85 kg (187 lb 8 oz)   SpO2 99%   BMI 30.26 kg/m     Estimated body mass index is 30.26 kg/m  as calculated from the following:    Height as of this encounter: 1.676 m (5' 6\").    Weight as of this encounter: 85 kg (187 lb 8 oz).    Physical Exam  GENERAL: alert, no distress, and obese  EYES: Eyes grossly normal to inspection, PERRL and conjunctivae and sclerae normal  HENT: ear canals and TM's normal, nose and mouth without ulcers or lesions  NECK: no adenopathy, no asymmetry, masses, or scars  RESP: lungs clear to auscultation - no rales, rhonchi or wheezes  BREAST: deferred  CV: regular rate and rhythm, normal S1 S2, no S3 or S4, no murmur, click or rub, no peripheral edema  ABDOMEN: soft, nontender, no hepatosplenomegaly, no masses and bowel sounds normal   (female): deferred  MS: no gross musculoskeletal defects noted, no edema  SKIN: no suspicious lesions or rashes  NEURO: " Normal strength and tone, mentation intact and speech normal  PSYCH: mentation appears normal, affect normal/bright        Signed Electronically by: Perla Nieto PA-C    Answers submitted by the patient for this visit:  Patient Health Questionnaire (Submitted on 5/24/2024)  If you checked off any problems, how difficult have these problems made it for you to do your work, take care of things at home, or get along with other people?: Not difficult at all  PHQ9 TOTAL SCORE: 0

## 2024-05-25 LAB
ALBUMIN SERPL BCG-MCNC: 4.5 G/DL (ref 3.5–5.2)
ALP SERPL-CCNC: 83 U/L (ref 40–150)
ALT SERPL W P-5'-P-CCNC: 16 U/L (ref 0–50)
ANION GAP SERPL CALCULATED.3IONS-SCNC: 12 MMOL/L (ref 7–15)
AST SERPL W P-5'-P-CCNC: 19 U/L (ref 0–45)
BILIRUB SERPL-MCNC: <0.2 MG/DL
BUN SERPL-MCNC: 8.8 MG/DL (ref 6–20)
CALCIUM SERPL-MCNC: 9.6 MG/DL (ref 8.6–10)
CHLORIDE SERPL-SCNC: 103 MMOL/L (ref 98–107)
CHOLEST SERPL-MCNC: 136 MG/DL
CREAT SERPL-MCNC: 0.96 MG/DL (ref 0.51–0.95)
DEPRECATED HCO3 PLAS-SCNC: 23 MMOL/L (ref 22–29)
EGFRCR SERPLBLD CKD-EPI 2021: 77 ML/MIN/1.73M2
FASTING STATUS PATIENT QL REPORTED: NO
FASTING STATUS PATIENT QL REPORTED: NO
GLUCOSE SERPL-MCNC: 87 MG/DL (ref 70–99)
HDLC SERPL-MCNC: 59 MG/DL
LDLC SERPL CALC-MCNC: 65 MG/DL
NONHDLC SERPL-MCNC: 77 MG/DL
POTASSIUM SERPL-SCNC: 4.1 MMOL/L (ref 3.4–5.3)
PROT SERPL-MCNC: 7.8 G/DL (ref 6.4–8.3)
SODIUM SERPL-SCNC: 138 MMOL/L (ref 135–145)
TRIGL SERPL-MCNC: 59 MG/DL

## 2024-05-26 NOTE — RESULT ENCOUNTER NOTE
Dear Ottoniel  Your electrolytes, blood sugar, kidney function and liver function were normal.    Your cholesterol looks great.   Your blood counts and hemoglobin were normal.    Please call or MyChart my office with any questions or concerns.   Perla Nieto, PAC

## 2024-05-28 LAB
DEPRECATED CALCIDIOL+CALCIFEROL SERPL-MC: <33 UG/L (ref 20–75)
VITAMIN D2 SERPL-MCNC: <5 UG/L
VITAMIN D3 SERPL-MCNC: 28 UG/L

## 2024-05-28 NOTE — RESULT ENCOUNTER NOTE
Dear Ottoniel  Your vitamin D level was normal.  Continue supplements as you have been taking.  Please call or MyChart my office with any questions or concerns.   Perla Nieto, PAC

## 2024-05-28 NOTE — TELEPHONE ENCOUNTER
Faxed completed form to [employer] at [phone].    Urmila Bright [facility]  [facility] Liaison  [email or web address]  Phone: [phone]  Fax: [phone]

## 2024-05-29 NOTE — TELEPHONE ENCOUNTER
Patient Contact    Attempt # 1    Was call answered?  No    Left a voicemail asking patient to give us a call back at our main dermatology line at [phone] and also let her know I sent a [employer] message with message from shannon below (see [employer] encounter from 05/29/24).     Alicia COREA RN BSN  [facility]  [phone]

## 2024-05-29 NOTE — TELEPHONE ENCOUNTER
[address]    [employer] User Last Read On   Ottoniel Mccormack 5/29/2024  1:56 PM       Patient read [employer] message. See above       Alicia COREA RN BSN  [facility]  [phone]

## 2024-05-29 NOTE — TELEPHONE ENCOUNTER
There is no cure for eczema.   We consider dupixent a treatment success if you have 80-90% improvement. Some of our patients will get completely clear.     Please let patient know this is a long term medication. If skin is clear we can discuss decreasing frequency of injection. We can stop sometimes skin can remain clear for a while before flaring, this is just case by case. Sometimes when eczema returns it isn't as severe as it used to be, again case by case.     You can still get some flaring on the medication but the hope is that it is minimal then prior to taking medication.

## 2024-06-03 NOTE — TELEPHONE ENCOUNTER
Prior Authorization Approval    Medication: DUPIXENT 300 MG/2ML SC SOPN  Authorization Effective Date: 5/24/2024  Authorization Expiration Date: 5/24/2025  Approved Dose/Quantity: 4ml per 28 days  Reference #:     Insurance Company: [employer] Non-Specialty [employer]'s - Phone [phone] Fax [phone]  Expected CoPay: $    CoPay Card Available:      Financial Assistance Needed: na  Which Pharmacy is filling the prescription:    Pharmacy Notified: no  Patient Notified:         Urmila Bright [facility] Liaison  [email or web address]  Phone: [phone]  Fax: [phone]

## 2024-06-26 DIAGNOSIS — J30.9 ALLERGIC RHINITIS, UNSPECIFIED SEASONALITY, UNSPECIFIED TRIGGER: ICD-10-CM

## 2024-06-26 RX ORDER — FLUTICASONE PROPIONATE 50 MCG
1 SPRAY, SUSPENSION (ML) NASAL DAILY
Qty: 48 ML | Refills: 2 | OUTPATIENT
Start: 2024-06-26

## 2024-07-02 DIAGNOSIS — J45.30 MILD PERSISTENT ASTHMA WITHOUT COMPLICATION: ICD-10-CM

## 2024-07-02 RX ORDER — ALBUTEROL SULFATE 90 UG/1
2 AEROSOL, METERED RESPIRATORY (INHALATION) EVERY 4 HOURS PRN
Qty: 18 G | Refills: 2 | Status: SHIPPED | OUTPATIENT
Start: 2024-07-02 | End: 2024-09-16

## 2024-07-08 ENCOUNTER — E-VISIT (OUTPATIENT)
Dept: FAMILY MEDICINE | Facility: CLINIC | Age: 39
End: 2024-07-08
Payer: COMMERCIAL

## 2024-07-08 DIAGNOSIS — R09.81 NASAL CONGESTION: ICD-10-CM

## 2024-07-08 DIAGNOSIS — J06.9 VIRAL URI: Primary | ICD-10-CM

## 2024-07-08 PROCEDURE — 99207 PR NON-BILLABLE SERV PER CHARTING: CPT | Performed by: PHYSICIAN ASSISTANT

## 2024-07-09 ENCOUNTER — OFFICE VISIT (OUTPATIENT)
Dept: URGENT CARE | Facility: URGENT CARE | Age: 39
End: 2024-07-09
Payer: COMMERCIAL

## 2024-07-09 VITALS
BODY MASS INDEX: 30.83 KG/M2 | SYSTOLIC BLOOD PRESSURE: 146 MMHG | DIASTOLIC BLOOD PRESSURE: 86 MMHG | RESPIRATION RATE: 16 BRPM | WEIGHT: 191 LBS | OXYGEN SATURATION: 99 % | TEMPERATURE: 98.7 F | HEART RATE: 91 BPM

## 2024-07-09 DIAGNOSIS — J01.00 ACUTE NON-RECURRENT MAXILLARY SINUSITIS: Primary | ICD-10-CM

## 2024-07-09 PROCEDURE — 99213 OFFICE O/P EST LOW 20 MIN: CPT

## 2024-07-09 ASSESSMENT — PAIN SCALES - GENERAL: PAINLEVEL: EXTREME PAIN (8)

## 2024-07-09 NOTE — PATIENT INSTRUCTIONS
The symptoms you describe suggest a viral cause, which is much more common than a bacterial cause. Antibiotics will treat bacterial infections, but have no effect on viral infections. If possible, especially if improving, start with symptom care for the first 7-10 days, then consider seeking further treatment or taking an antibiotic. Bacterial infections generally are more severe, including symptoms such as pus, fever over 101degrees F, or rapidly worsening.  You may want to try warm salt water gargles or rinses to feel better or help prevent another bout in the future. Mix 1 teaspoon of salt in 8 ounces of water, gargle, and spit. Do this several times a day for several days. Do not swallow the mixture.    You may want to try a nasal lavage (also known as nasal irrigation). You can find over-the-counter products, such as Neti-Pot, at retail locations or make your own at home. Instructions for homemade nasal lavage and more information on the process are available online at http://www.aafp.org/afp/2009/1115/p1121.html.

## 2024-07-09 NOTE — PROGRESS NOTES
ASSESSMENT:  (J01.00) Acute non-recurrent maxillary sinusitis  (primary encounter diagnosis)  Plan: amoxicillin-clavulanate (AUGMENTIN) 875-125 MG         tablet    PLAN:  Acute sinusitis patient instructions discussed and provided.  We discussed the need to take the antibiotic as prescribed and finish the full course even if symptoms improve.  We also discussed trying yogurt with active cultures or probiotic such as Culturelle daily to help find diarrhea while taking the antibiotic.  Informed the patient to use nasal saline spray, humidifier/steam and/or Bern pot for the nasal symptoms.  Discussed the need to follow-up with their primary care provider should symptoms persist.  Patient acknowledged their understanding of the above plan.    The use of Dragon/Apparcandoation services may have been used to construct the content in this note; any grammatical or spelling errors are non-intentional. Please contact the author of this note directly if you are in need of any clarification.      SUBJECTIVE:   Ottoniel Mccormack is a 38 year old female presenting with a chief complaint of runny nose, stuffy nose, facial pain/pressure, non-productive cough and sweating.  Onset of symptoms was 1 week ago.  Course of illness is worsening.    Patient denies: ear pain, sore throat, vomiting, and diarrhea  Treatment measures tried include Excedrin, ibuprofen, Halima Pot and Sudafed.  Predisposing factors include None.    ROS:  Negative except noted above.    OBJECTIVE:  BP (!) 146/86 (BP Location: Left arm, Patient Position: Sitting, Cuff Size: Adult Large)   Pulse 91   Temp 98.7  F (37.1  C) (Oral)   Resp 16   Wt 86.6 kg (191 lb)   SpO2 99%   BMI 30.83 kg/m    GENERAL APPEARANCE: healthy, alert and no distress  EYES: EOMI,  PERRL, conjunctiva clear  HENT: nasal turbinates erythematous, swollen and maxillary sinus tenderness   NECK: supple, nontender, no lymphadenopathy  RESP: lungs clear to auscultation - no rales, rhonchi  or wheezes  CV: regular rates and rhythm, normal S1 S2, no murmur noted  SKIN: no suspicious lesions or rashes

## 2024-07-09 NOTE — PATIENT INSTRUCTIONS
Take the antibiotic as prescribed and finish the full course even if symptoms improve.  Try yogurt with active cultures or probiotics such as Culturelle daily to help prevent diarrhea while using antibiotics.  You can use nasal saline spray, Halima pot and/or humidifier/steam for your nasal symptoms.  Follow-up with your primary care provider should symptoms persist.

## 2024-08-20 DIAGNOSIS — J30.9 ALLERGIC RHINITIS, UNSPECIFIED SEASONALITY, UNSPECIFIED TRIGGER: ICD-10-CM

## 2024-08-20 RX ORDER — FLUTICASONE PROPIONATE 50 MCG
1 SPRAY, SUSPENSION (ML) NASAL DAILY
Qty: 48 G | Refills: 1 | Status: SHIPPED | OUTPATIENT
Start: 2024-08-20

## 2024-09-07 ENCOUNTER — HEALTH MAINTENANCE LETTER (OUTPATIENT)
Age: 39
End: 2024-09-07

## 2024-09-14 DIAGNOSIS — J45.30 MILD PERSISTENT ASTHMA WITHOUT COMPLICATION: ICD-10-CM

## 2024-09-16 RX ORDER — ALBUTEROL SULFATE 90 UG/1
2 AEROSOL, METERED RESPIRATORY (INHALATION) EVERY 4 HOURS PRN
Qty: 48 G | Refills: 0 | Status: SHIPPED | OUTPATIENT
Start: 2024-09-16

## 2024-12-12 ENCOUNTER — E-VISIT (OUTPATIENT)
Dept: URGENT CARE | Facility: CLINIC | Age: 39
End: 2024-12-12
Payer: COMMERCIAL

## 2024-12-12 DIAGNOSIS — N39.0 ACUTE UTI (URINARY TRACT INFECTION): Primary | ICD-10-CM

## 2024-12-12 RX ORDER — NITROFURANTOIN 25; 75 MG/1; MG/1
100 CAPSULE ORAL 2 TIMES DAILY
Qty: 10 CAPSULE | Refills: 0 | Status: SHIPPED | OUTPATIENT
Start: 2024-12-12 | End: 2024-12-17

## 2024-12-12 NOTE — PATIENT INSTRUCTIONS
Dear Ottoniel Mccormack    After reviewing your responses, I've been able to diagnose you with a urinary tract infection, which is a common infection of the bladder with bacteria.  This is not a sexually transmitted infection, though urinating immediately after intercourse can help prevent infections.  Drinking lots of fluids is also helpful to clear your current infection and prevent the next one.      I have sent a prescription for antibiotics to your pharmacy to treat this infection.    It is important that you take all of your prescribed medication even if your symptoms are improving after a few doses.  Taking all of your medicine helps prevent the symptoms from returning.     If your symptoms worsen, you develop pain in your back or stomach, develop fevers, or are not improving in 5 days, please contact your primary care provider for an appointment or visit any of our convenient Walk-in or Urgent Care Centers to be seen, which can be found on our website here.    Thanks again for choosing us as your health care partner,    Clare Baeza PA-C  Urinary Tract Infection (UTI) in Women: Care Instructions  Overview     A urinary tract infection (UTI) is an infection caused by bacteria. It can happen anywhere in the urinary tract. A UTI can happen in the:  Kidneys.  Ureters, the tubes that connect the kidneys to the bladder.  Bladder.  Urethra, where the urine comes out.  Most UTIs are bladder infections. They often cause pain or burning when you urinate.  Most UTIs can be cured with antibiotics. If you are prescribed antibiotics, be sure to complete your treatment so that the infection does not get worse.  Follow-up care is a key part of your treatment and safety. Be sure to make and go to all appointments, and call your doctor if you are having problems. It's also a good idea to know your test results and keep a list of the medicines you take.  How can you care for yourself at home?  Take your antibiotics as directed.  "Do not stop taking them just because you feel better. You need to take the full course of antibiotics.  Drink extra water and other fluids for the next day or two. This will help make the urine less concentrated and help wash out the bacteria that are causing the infection. (If you have kidney, heart, or liver disease and have to limit fluids, talk with your doctor before you increase the amount of fluids you drink.)  Avoid drinks that are carbonated or have caffeine. They can irritate the bladder.  Urinate often. Try to empty your bladder each time.  To relieve pain, take a hot bath or lay a heating pad set on low over your lower belly or genital area. Never go to sleep with a heating pad in place.  To prevent UTIs  Drink plenty of water each day. This helps you urinate often, which clears bacteria from your system. (If you have kidney, heart, or liver disease and have to limit fluids, talk with your doctor before you increase the amount of fluids you drink.)  Urinate when you need to.  If you are sexually active, urinate right after you have sex.  Change sanitary pads often.  Avoid douches, bubble baths, feminine hygiene sprays, and other feminine hygiene products that have deodorants.  After going to the bathroom, wipe from front to back.  When should you call for help?   Call your doctor now or seek immediate medical care if:    You have new or worse fever, chills, nausea, or vomiting.     You have new pain in your back just below your rib cage. This is called flank pain.     There is new blood or pus in your urine.     You have any problems with your antibiotic medicine.   Watch closely for changes in your health, and be sure to contact your doctor if:    You are not getting better after taking an antibiotic for 2 days.     Your symptoms go away but then come back.   Where can you learn more?  Go to https://www.healthwise.net/patiented  Enter K848 in the search box to learn more about \"Urinary Tract Infection " "(UTI) in Women: Care Instructions.\"  Current as of: November 15, 2023  Content Version: 14.2 2024 [facility] [place], [facility].   Care instructions adapted under license by your healthcare professional. If you have questions about a medical condition or this instruction, always ask your healthcare professional. Healthwise[employer]orporated disclaims any warranty or liability for your use of this information.    "

## 2024-12-26 ENCOUNTER — PATIENT OUTREACH (OUTPATIENT)
Dept: CARE COORDINATION | Facility: CLINIC | Age: 39
End: 2024-12-26
Payer: COMMERCIAL

## 2025-01-06 ENCOUNTER — ANCILLARY PROCEDURE (OUTPATIENT)
Dept: GENERAL RADIOLOGY | Facility: CLINIC | Age: 40
End: 2025-01-06
Payer: COMMERCIAL

## 2025-01-06 ENCOUNTER — OFFICE VISIT (OUTPATIENT)
Dept: URGENT CARE | Facility: URGENT CARE | Age: 40
End: 2025-01-06
Payer: COMMERCIAL

## 2025-01-06 VITALS
HEART RATE: 93 BPM | WEIGHT: 187 LBS | RESPIRATION RATE: 20 BRPM | TEMPERATURE: 98.5 F | HEIGHT: 65 IN | SYSTOLIC BLOOD PRESSURE: 142 MMHG | BODY MASS INDEX: 31.16 KG/M2 | OXYGEN SATURATION: 99 % | DIASTOLIC BLOOD PRESSURE: 89 MMHG

## 2025-01-06 DIAGNOSIS — S46.912A STRAIN OF LEFT SHOULDER, INITIAL ENCOUNTER: Primary | ICD-10-CM

## 2025-01-06 DIAGNOSIS — M25.512 ACUTE PAIN OF LEFT SHOULDER: ICD-10-CM

## 2025-01-06 PROCEDURE — 99213 OFFICE O/P EST LOW 20 MIN: CPT

## 2025-01-06 PROCEDURE — 73030 X-RAY EXAM OF SHOULDER: CPT | Mod: TC | Performed by: RADIOLOGY
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
## 2025-01-07 NOTE — PATIENT INSTRUCTIONS
Left shoulder x-ray does not show a fracture or dislocation per the radiologist report.  Rest and use ice to the area for pain.  You can take Tylenol as needed for pain with the maximum dose being 4000 mg in a 24-hour period of time.  Take the tizanidine as prescribed.  Activity as tolerated.  Follow-up with your primary care provider should symptoms persist.

## 2025-01-15 ENCOUNTER — MYC MEDICAL ADVICE (OUTPATIENT)
Dept: OBGYN | Facility: CLINIC | Age: 40
End: 2025-01-15
Payer: COMMERCIAL

## 2025-01-15 DIAGNOSIS — D25.1 INTRAMURAL, SUBMUCOUS, AND SUBSEROUS LEIOMYOMA OF UTERUS: ICD-10-CM

## 2025-01-15 DIAGNOSIS — D25.0 INTRAMURAL, SUBMUCOUS, AND SUBSEROUS LEIOMYOMA OF UTERUS: ICD-10-CM

## 2025-01-15 DIAGNOSIS — D25.2 INTRAMURAL, SUBMUCOUS, AND SUBSEROUS LEIOMYOMA OF UTERUS: ICD-10-CM

## 2025-01-15 RX ORDER — NORETHINDRONE 5 MG/1
5 TABLET ORAL DAILY
Qty: 90 TABLET | Refills: 3 | Status: SHIPPED | OUTPATIENT
Start: 2025-01-15

## 2025-01-15 NOTE — TELEPHONE ENCOUNTER
Requested Prescriptions   Pending Prescriptions Disp Refills    norethindrone (AYGESTIN) 5 MG tablet 90 tablet 3     Sig: Take 1 tablet (5 mg) by mouth daily.       There is no refill protocol information for this order        Last seen: 2/05/2024  Last refilled: 2/05/2024 for 90 tabs & 3 refills    Routing to provider for eval/auth or hudson refill, to get patient through to her upcoming appointment 2/14/2025.    Michelle IGNACIO RN -  OB/GYN group

## 2025-01-16 ENCOUNTER — VIRTUAL VISIT (OUTPATIENT)
Dept: FAMILY MEDICINE | Facility: CLINIC | Age: 40
End: 2025-01-16
Payer: COMMERCIAL

## 2025-01-16 ENCOUNTER — TELEPHONE (OUTPATIENT)
Dept: FAMILY MEDICINE | Facility: CLINIC | Age: 40
End: 2025-01-16

## 2025-01-16 DIAGNOSIS — Z86.73 H/O ISCHEMIC RIGHT MCA STROKE: ICD-10-CM

## 2025-01-16 DIAGNOSIS — M25.512 ACUTE PAIN OF LEFT SHOULDER: Primary | ICD-10-CM

## 2025-01-16 DIAGNOSIS — J45.30 MILD PERSISTENT ASTHMA WITHOUT COMPLICATION: ICD-10-CM

## 2025-01-16 DIAGNOSIS — F32.1 MODERATE MAJOR DEPRESSION (H): ICD-10-CM

## 2025-01-16 RX ORDER — MONTELUKAST SODIUM 10 MG/1
TABLET ORAL
Qty: 90 TABLET | Refills: 1 | OUTPATIENT
Start: 2025-01-16

## 2025-01-16 RX ORDER — BECLOMETHASONE DIPROPIONATE HFA 80 UG/1
1 AEROSOL, METERED RESPIRATORY (INHALATION) 2 TIMES DAILY
Qty: 30.8 G | Refills: 1 | Status: SHIPPED | OUTPATIENT
Start: 2025-01-16

## 2025-01-16 RX ORDER — MONTELUKAST SODIUM 10 MG/1
TABLET ORAL
Qty: 90 TABLET | Refills: 1 | Status: SHIPPED | OUTPATIENT
Start: 2025-01-16

## 2025-01-16 RX ORDER — ALBUTEROL SULFATE 0.83 MG/ML
2.5 SOLUTION RESPIRATORY (INHALATION) EVERY 6 HOURS PRN
Qty: 90 ML | Refills: 1 | Status: SHIPPED | OUTPATIENT
Start: 2025-01-16

## 2025-01-16 RX ORDER — ALBUTEROL SULFATE 90 UG/1
2 INHALANT RESPIRATORY (INHALATION) EVERY 4 HOURS PRN
Qty: 48 G | Refills: 0 | Status: SHIPPED | OUTPATIENT
Start: 2025-01-16

## 2025-01-16 ASSESSMENT — ASTHMA QUESTIONNAIRES
ACT_TOTALSCORE: 19
ACT_TOTALSCORE: 19
QUESTION_1 LAST FOUR WEEKS HOW MUCH OF THE TIME DID YOUR ASTHMA KEEP YOU FROM GETTING AS MUCH DONE AT WORK, SCHOOL OR AT HOME: A LITTLE OF THE TIME
QUESTION_2 LAST FOUR WEEKS HOW OFTEN HAVE YOU HAD SHORTNESS OF BREATH: ONCE OR TWICE A WEEK
QUESTION_5 LAST FOUR WEEKS HOW WOULD YOU RATE YOUR ASTHMA CONTROL: SOMEWHAT CONTROLLED
QUESTION_3 LAST FOUR WEEKS HOW OFTEN DID YOUR ASTHMA SYMPTOMS (WHEEZING, COUGHING, SHORTNESS OF BREATH, CHEST TIGHTNESS OR PAIN) WAKE YOU UP AT NIGHT OR EARLIER THAN USUAL IN THE MORNING: NOT AT ALL
QUESTION_4 LAST FOUR WEEKS HOW OFTEN HAVE YOU USED YOUR RESCUE INHALER OR NEBULIZER MEDICATION (SUCH AS ALBUTEROL): TWO OR THREE TIMES PER WEEK
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
## 2025-01-16 NOTE — PATIENT INSTRUCTIONS
Follow up with physical therapy and orthopedics for persistent left shoulder pain.  Get nebulizer from [facility] and use albuterol nebs as needed.   Return urgently if any change in symptoms like increasing cough, wheezing, shortness of breath not improved with albuterol or other change in symptoms.  Follow-up us in clinic in two months to reassess depression and asthma.  Follow up with neurology for history of stroke

## 2025-01-16 NOTE — LETTER
January 16, 2025      Ottoniel RICA [place]on  [address] [facility] [address]        To Whom It May Concern,       Patient suffered a left shoulder injury and was seen and evaluated in urgent care.  She has continued pain and loss of range of motion and cannot work in the office due to injury for at least two weeks (until January 31)and evaluated by physical therapy as well as orthopedic specialist.     Sincerely,        Perla Nieto PA-C    Electronically signed

## 2025-01-16 NOTE — TELEPHONE ENCOUNTER
Please call patient and advise to contact [facility] for nebulizer machine- DME order should be on provider office printer or my desk  on keyboard.    Follow up with neurology- missed appointment in October   Continue aspirin 81 mg daily     Follow up with me in person in 2 months- NP or same day ok- assist with scheduling

## 2025-01-16 NOTE — TELEPHONE ENCOUNTER
Writer called pt and informed her of provider's message. Number given for [facility]. Writer did find the letter provider wrote but pt stated she can see it on her MyChart. Pt will continue with the aspirin and call to schedule her neurology visit. Visit with provider scheduled for 2 months.

## 2025-01-20 ENCOUNTER — PRE VISIT (OUTPATIENT)
Dept: ORTHOPEDICS | Facility: CLINIC | Age: 40
End: 2025-01-20

## 2025-01-20 ENCOUNTER — OFFICE VISIT (OUTPATIENT)
Dept: ORTHOPEDICS | Facility: CLINIC | Age: 40
End: 2025-01-20
Attending: PHYSICIAN ASSISTANT
Payer: COMMERCIAL

## 2025-01-20 DIAGNOSIS — M25.512 ACUTE PAIN OF LEFT SHOULDER: ICD-10-CM

## 2025-01-20 DIAGNOSIS — S43.102A SEPARATION OF LEFT ACROMIOCLAVICULAR JOINT, INITIAL ENCOUNTER: Primary | ICD-10-CM

## 2025-01-20 PROCEDURE — 99203 OFFICE O/P NEW LOW 30 MIN: CPT | Performed by: FAMILY MEDICINE

## 2025-01-20 RX ORDER — IBUPROFEN 800 MG/1
800 TABLET, FILM COATED ORAL EVERY 8 HOURS PRN
Qty: 60 TABLET | Refills: 1 | Status: SHIPPED | OUTPATIENT
Start: 2025-01-20

## 2025-01-20 ASSESSMENT — PAIN SCALES - GENERAL: PAINLEVEL_OUTOF10: MODERATE PAIN (6)

## 2025-01-20 NOTE — LETTER
Dear  or ,        Ms. Dustin Mccormack is under the care of our orthopedic office for a shoulder condition.  Please allow her to work from home as her condition mandates for comfort and to reduce injury risk.  Please contact me directly with questions, concerns, or if clarification is necessary.  These restrictions will be in place until February 17, 2025.        Sincerely,          Matheus Cary DO CAQSM
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
## 2025-01-20 NOTE — NURSING NOTE
Chief Complaint   Patient presents with    Left Shoulder - Pain, New Patient     Fall injury on 12/28/2024       There were no vitals filed for this visit.    There is no height or weight on file to calculate BMI.      IHSAN Sim NREMT

## 2025-01-20 NOTE — TELEPHONE ENCOUNTER
REASON FOR VISIT: left shoulder injury 12/28 with limited rom - radiating pain    DATE OF APPT: 1/20/2025   NOTES (FOR ALL VISITS) STATUS DETAILS   OFFICE NOTE from referring provider [facility]  Perla Nieto PA-C 1/16/2025   OFFICE NOTE from other specialist [facility]  Saleem Rocha, APRN CNP 1/06/2025   EMG N/A    MEDICATION LIST N/A    IMAGING  (FOR ALL VISITS)     XR Internal [facility]  XR Shoulder left 1/06/2025   MRI (HEAD, NECK, SPINE) N/A    CT (HEAD, NECK, SPINE) N/A

## 2025-01-30 ENCOUNTER — THERAPY VISIT (OUTPATIENT)
Dept: PHYSICAL THERAPY | Facility: CLINIC | Age: 40
End: 2025-01-30
Attending: FAMILY MEDICINE
Payer: COMMERCIAL

## 2025-01-30 DIAGNOSIS — S43.102A SEPARATION OF LEFT ACROMIOCLAVICULAR JOINT, INITIAL ENCOUNTER: ICD-10-CM

## 2025-01-30 DIAGNOSIS — M25.512 ACUTE PAIN OF LEFT SHOULDER: ICD-10-CM

## 2025-01-30 PROCEDURE — 97110 THERAPEUTIC EXERCISES: CPT | Mod: GP

## 2025-01-30 PROCEDURE — 97161 PT EVAL LOW COMPLEX 20 MIN: CPT | Mod: GP

## 2025-01-30 ASSESSMENT — ACTIVITIES OF DAILY LIVING (ADL)
PUTTING_ON_YOUR_PANTS: 5
PUTTING_ON_AN_UNDERSHIRT_OR_A_PULLOVER_SWEATER: 9
PLACING_AN_OBJECT_ON_A_HIGH_SHELF: 8
PUTTING_ON_A_SHIRT_THAT_BUTTONS_DOWN_THE_FRONT: 5
REMOVING_SOMETHING_FROM_YOUR_BACK_POCKET: 7
PUSHING_WITH_THE_INVOLVED_ARM: 6
REACHING_FOR_SOMETHING_ON_A_HIGH_SHELF: 7
PLEASE_INDICATE_YOR_PRIMARY_REASON_FOR_REFERRAL_TO_THERAPY:: SHOULDER
WASHING_YOUR_BACK: 8
CARRYING_A_HEAVY_OBJECT_OF_10_POUNDS: 10
WHEN_LYING_ON_THE_INVOLVED_SIDE: 7
TOUCHING_THE_BACK_OF_YOUR_NECK: 5
WASHING_YOUR_HAIR?: 8
AT_ITS_WORST?: 5

## 2025-01-30 NOTE — PROGRESS NOTES
PHYSICAL THERAPY EVALUATION  Type of Visit: Evaluation       Fall Risk Screen:{TIP  If fall risk screening needs updating, please click link.:153917}  Fall screen completed by: PT  Have you fallen 2 or more times in the past year?: No  Have you fallen and had an injury in the past year?: No  Is patient a fall risk?: No    Subjective   Pt notes she landed on her L shoulder resulting in L AC separation and residual L shoulder pain and stiffness effecting her ability to get to sleep at night and raise her arm backwards or carrying heavy objects.   {Disappearing TIP  Health History pop-up / flowsheet :349766}   Presenting condition or subjective complaint: Shoulder injury, limited range of motion. Intermittent pain.  Date of onset:   {Disappearing TIP  Date of onset/injury :829549}   Relevant medical history: Asthma   Dates & types of surgery:      Prior diagnostic imaging/testing results: X-ray     Prior therapy history for the same diagnosis, illness or injury: No      Living Environment  Social support: Alone   Type of home: [facility]   Stairs to enter the home: No       Ramp: No   Stairs inside the home: Yes 13 Is there a railing: Yes  Help at home: None  Equipment owned:       Employment: Yes   Hobbies/Interests:      Patient goals for therapy: Lift arm high, very heavy things. Lay on that sidee.       Objective   SHOULDER EVALUATION  PAIN: Pain Level at Rest: 3/10  Pain Level with Use: 8/10  Pain Location: shoulder  Pain Quality: constant aching and stiffness; intermittent sharp pain  Pain Frequency: constant  Pain is Worst: daytime or nighttime  Pain is Exacerbated By: Reaching backwards, dressing, and carrying heavy objects  Pain is Relieved By: sitting upright - avoidance  POSTURE: Standing Posture: Rounded shoulders, Forward head, Thoracic kyphosis increased  ROM:   (Degrees) Left AROM Left PROM Right AROM  Right PROM   Shoulder Flexion Painful arc ; 102 full ROM  WFL    Shoulder Abduction 82   WFL    Shoulder Internal Rotation  Limited - guarded  WFL   Shoulder External Rotation  guarded  WFL   Pain:   End feel:   STRENGTH:  DNT d/t pain with AROM  FLEXIBILITY: {RADHA PT Flexibility (Optional):704715}  SPECIAL TESTS: {RADHA PT SPEC TEST SHOULDER (Optional):759819}  PALPATION: {RADHA PT Palpation shoulder (Optional):844514}  JOINT MOBILITY: {RADHA PT Joint Mobility Shoulder (Optional):433172}  CERVICAL SCREEN: {radha pt cervical screen (Optional):183309}    Assessment & Plan   CLINICAL IMPRESSIONS  Medical Diagnosis:    {Disappearing TIP  Medical Dx :483637}  Treatment Diagnosis:   {Disappearing TIP  Treatment Dx :825986}  Impression/Assessment: {radha pt assessment:204225}    Clinical Decision Making (Complexity):  Clinical Presentation: {Presentation:254870}  Clinical Presentation Rationale: based on medical and personal factors listed in PT evaluation  Clinical Decision Making (Complexity): {Complexity:502688}    PLAN OF CARE  Treatment Interventions:  {:314779}    Long Term Goals {Disappearing TIP  Goals :061363}         {Disapparing TIP  Frequency/Duration :576441}  Frequency of Treatment:    Duration of Treatment:      Recommended Referrals to Other Professionals: {[facility] referrals suggested:833938}  Education Assessment: {Disapparing TIP  Patient Education :761776}       Risks and benefits of evaluation/treatment have been explained.   Patient/Family/caregiver agrees with Plan of Care.     Evaluation Time:   {Disappearing TIP  Eval Minutes :150603}     {EVAL ONLY:375856}     Signing Clinician: AMANDA MIDDLETON

## 2025-02-03 PROBLEM — M25.512 ACUTE PAIN OF LEFT SHOULDER: Status: ACTIVE | Noted: 2025-02-03

## 2025-02-03 PROBLEM — S43.102A SEPARATION OF LEFT ACROMIOCLAVICULAR JOINT, INITIAL ENCOUNTER: Status: ACTIVE | Noted: 2025-02-03

## 2025-02-05 ENCOUNTER — MYC MEDICAL ADVICE (OUTPATIENT)
Dept: FAMILY MEDICINE | Facility: CLINIC | Age: 40
End: 2025-02-05
Payer: COMMERCIAL

## 2025-02-05 NOTE — TELEPHONE ENCOUNTER
Routing to provider for review. Is the orthopedic referral or Physical Therapy referral for carpal tunnel as well? Please advise. Justin Stinson RN, BSN

## 2025-02-13 ENCOUNTER — THERAPY VISIT (OUTPATIENT)
Dept: PHYSICAL THERAPY | Facility: CLINIC | Age: 40
End: 2025-02-13
Attending: FAMILY MEDICINE
Payer: COMMERCIAL

## 2025-02-13 DIAGNOSIS — S43.102A SEPARATION OF LEFT ACROMIOCLAVICULAR JOINT, INITIAL ENCOUNTER: ICD-10-CM

## 2025-02-13 DIAGNOSIS — M25.512 ACUTE PAIN OF LEFT SHOULDER: Primary | ICD-10-CM

## 2025-02-14 ENCOUNTER — MYC MEDICAL ADVICE (OUTPATIENT)
Dept: DERMATOLOGY | Facility: CLINIC | Age: 40
End: 2025-02-14

## 2025-02-14 DIAGNOSIS — L20.9 ATOPIC DERMATITIS, UNSPECIFIED TYPE: ICD-10-CM

## 2025-02-17 RX ORDER — BETAMETHASONE DIPROPIONATE 0.5 MG/G
CREAM TOPICAL
Qty: 50 G | Refills: 0 | Status: SHIPPED | OUTPATIENT
Start: 2025-02-17

## 2025-02-17 NOTE — TELEPHONE ENCOUNTER
Has a pending appointment     Thank you,    Stephany DE GUZMANRN BSN  [facility]- [phone]

## 2025-02-26 ENCOUNTER — MYC REFILL (OUTPATIENT)
Dept: FAMILY MEDICINE | Facility: CLINIC | Age: 40
End: 2025-02-26

## 2025-02-26 DIAGNOSIS — F41.9 ANXIETY: ICD-10-CM

## 2025-02-26 DIAGNOSIS — F32.1 MODERATE MAJOR DEPRESSION (H): ICD-10-CM

## 2025-02-26 DIAGNOSIS — F43.10 PTSD (POST-TRAUMATIC STRESS DISORDER): ICD-10-CM

## 2025-02-26 RX ORDER — ESCITALOPRAM OXALATE 20 MG/1
20 TABLET ORAL DAILY
Qty: 90 TABLET | Refills: 0 | Status: SHIPPED | OUTPATIENT
Start: 2025-02-26

## 2025-03-21 ENCOUNTER — TELEPHONE (OUTPATIENT)
Dept: FAMILY MEDICINE | Facility: CLINIC | Age: 40
End: 2025-03-21

## 2025-03-21 NOTE — TELEPHONE ENCOUNTER
Reason for Call:  Appointment Request    Patient requesting this type of appt:  2 month follow up    Requested provider: Perla Nieto    Reason patient unable to be scheduled: Not within requested timeframe    When does patient want to be seen/preferred time: 1-2 weeks    Comments: Patient needs to reschedule 3/21/2025 appointment, would like to reschedule for March/April    Could we send this information to you in [place] or would you prefer to receive a phone call?:   Patient would prefer a phone call   Okay to leave a detailed message?: Yes at Home number on file [phone] (home)    Call taken on 3/21/2025 at 9:16 AM by Jesus Neri

## 2025-03-27 ENCOUNTER — TELEPHONE (OUTPATIENT)
Dept: DERMATOLOGY | Facility: CLINIC | Age: 40
End: 2025-03-27
Payer: COMMERCIAL

## 2025-03-27 DIAGNOSIS — L20.9 ATOPIC DERMATITIS, UNSPECIFIED TYPE: ICD-10-CM

## 2025-03-27 NOTE — TELEPHONE ENCOUNTER
Retail Pharmacy Prior Authorization Team   Phone: [phone]    Prior Authorization Approval    Medication: DUPIXENT 300 MG/2ML SC SOAJ  Authorization Effective Date: 2/25/2025  Authorization Expiration Date: 3/27/2026  Insurance Company: [employer] Non-Specialty PA's - Phone [phone] Fax [phone]  Which Pharmacy is filling the prescription: [facility] [place] - [address] [place] [address]  Pharmacy Notified: YES  Patient Notified: YES (faxed approval letter to pharmacy and notified patient via [employer] message)      .

## 2025-03-27 NOTE — TELEPHONE ENCOUNTER
Prior Authorization Specialty Medication Request    Medication/Dose: Dupixent   Diagnosis and ICD code (if different than what is on RX):    New/renewal/insurance change PA/secondary ins. PA:  Previously Tried and Failed:      Important Lab Values:   Rationale:     Insurance   Primary:   Insurance ID:      Secondary (if applicable):  Insurance ID:      Pharmacy Information (if different than what is on RX)  Name:    Phone:    Fax:    Clinic Information  Preferred routing pool for dept communication:

## 2025-04-01 NOTE — TELEPHONE ENCOUNTER
Call was transferred from [place] to [place] to schedule with Kemi Narvaez--Kemi is a same day provider and did not approve an appt. Patient needs to be worked in with a [place] Primary Care Provider who can actually follow up with the patient--Please work patient in to be seen with a  Primary Care Provider. Please call the patient back.

## 2025-04-04 NOTE — TELEPHONE ENCOUNTER
"Okay to schedule patient with me. Can schedule her over one of my blocked \"onboarding\" times.  Kalina Kumar PA-C on 4/4/2025 at 11:05 AM    "

## 2025-04-08 ENCOUNTER — TELEPHONE (OUTPATIENT)
Dept: BEHAVIORAL HEALTH | Facility: CLINIC | Age: 40
End: 2025-04-08
Payer: COMMERCIAL

## 2025-04-08 NOTE — TELEPHONE ENCOUNTER
"Pt is a(n) adult (18+ out of HS) Seeking as eval for Adult Mental Health DA for Programmatic Care - Program Preference? No.  Appointment scheduled by:  Patient.  (self-pay - complete Cost Estimate)       needed?  NO    Contact information verified/updated: Yes    If appt is for adult [facility] program location, confirm you have verified the location and address with the patient referring to the template header.  Yes    Vanessa Vallejo    \"We have scheduled your evaluation. In the event that your insurance coverage comes back as out of network, you may receive a call to cancel your appointment and direct you to your insurance company for in-network coverage.\"    Disclaimer regarding insurance read to patient?  Yes  Informed patient [place] are for programming that is in person in the [place] area?  Yes - proceed with scheduling      "

## 2025-04-10 ENCOUNTER — TELEPHONE (OUTPATIENT)
Dept: BEHAVIORAL HEALTH | Facility: CLINIC | Age: 40
End: 2025-04-10
Payer: COMMERCIAL

## 2025-04-10 DIAGNOSIS — F33.2 SEVERE EPISODE OF RECURRENT MAJOR DEPRESSIVE DISORDER, WITHOUT PSYCHOTIC FEATURES (H): Primary | ICD-10-CM
